# Patient Record
Sex: FEMALE | Race: WHITE
[De-identification: names, ages, dates, MRNs, and addresses within clinical notes are randomized per-mention and may not be internally consistent; named-entity substitution may affect disease eponyms.]

---

## 2018-05-14 ENCOUNTER — HOSPITAL ENCOUNTER (OUTPATIENT)
Dept: HOSPITAL 88 - RAD | Age: 83
End: 2018-05-14
Attending: FAMILY MEDICINE
Payer: MEDICARE

## 2018-05-14 DIAGNOSIS — R05: Primary | ICD-10-CM

## 2018-05-14 PROCEDURE — 71046 X-RAY EXAM CHEST 2 VIEWS: CPT

## 2018-05-14 NOTE — DIAGNOSTIC IMAGING REPORT
PROCEDURE: X-RAY CHEST, TWO VIEWS

COMPARISON: 9/3/17.

INDICATIONS: COUGH

 

FINDINGS:



LUNGS: Stable hyperinflation. There is diffuse bronchial wall 

thickening without focal consolidation. Subsegmental atelectasis/scar 

has developed in the lateral right upper lobe. There is also 

subsegmental atelectasis/scar in the lingula.

 

PLEURA: No effusions or pneumothorax.

 

HEART \T\ 

MEDIASTINUM: The heart is normal in size. Aorta is ectatic but stable.

 

BONES \T\

SOFT TISSUES: Diffusely demineralized. Severe degenerative changes of 

the left shoulder are stable. No focal osseous lesions. 

 

An IVC filter in the upper abdomen is incompletely imaged.

 

CONCLUSION:

Diffuse bronchial wall thickening suggestive of bronchitis. No 

consolidation.

Stable pulmonary hyperinflation. 

 

Dictated by:  Lisa Mullen M.D. on 5/14/2018 at 14:39     

Electronically approved by:  Lisa Mullen M.D. on 5/14/2018 at 

14:39

## 2018-05-25 ENCOUNTER — HOSPITAL ENCOUNTER (OUTPATIENT)
Dept: HOSPITAL 88 - WCC | Age: 83
LOS: 6 days | End: 2018-05-31
Attending: INTERNAL MEDICINE
Payer: MEDICARE

## 2018-05-25 DIAGNOSIS — R60.0: ICD-10-CM

## 2018-05-25 DIAGNOSIS — L97.321: ICD-10-CM

## 2018-05-25 DIAGNOSIS — I87.332: Primary | ICD-10-CM

## 2018-05-25 DIAGNOSIS — I87.2: ICD-10-CM

## 2018-10-13 ENCOUNTER — HOSPITAL ENCOUNTER (INPATIENT)
Dept: HOSPITAL 88 - ER | Age: 83
LOS: 23 days | Discharge: NURSING, REHAB OR LONG-TERM CARE | DRG: 335 | End: 2018-11-05
Attending: INTERNAL MEDICINE | Admitting: INTERNAL MEDICINE
Payer: MEDICARE

## 2018-10-13 VITALS — HEIGHT: 66 IN | WEIGHT: 120.44 LBS | BODY MASS INDEX: 19.36 KG/M2

## 2018-10-13 DIAGNOSIS — I87.2: ICD-10-CM

## 2018-10-13 DIAGNOSIS — Z86.711: ICD-10-CM

## 2018-10-13 DIAGNOSIS — I50.9: ICD-10-CM

## 2018-10-13 DIAGNOSIS — E03.9: ICD-10-CM

## 2018-10-13 DIAGNOSIS — E87.6: ICD-10-CM

## 2018-10-13 DIAGNOSIS — D62: ICD-10-CM

## 2018-10-13 DIAGNOSIS — Z79.01: ICD-10-CM

## 2018-10-13 DIAGNOSIS — K56.51: Primary | ICD-10-CM

## 2018-10-13 DIAGNOSIS — R53.81: ICD-10-CM

## 2018-10-13 DIAGNOSIS — I35.0: ICD-10-CM

## 2018-10-13 DIAGNOSIS — A41.9: ICD-10-CM

## 2018-10-13 DIAGNOSIS — I25.10: ICD-10-CM
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
## 2018-10-13 DIAGNOSIS — J44.9: ICD-10-CM

## 2018-10-13 DIAGNOSIS — N30.00: ICD-10-CM

## 2018-10-13 DIAGNOSIS — Z86.718: ICD-10-CM

## 2018-10-13 DIAGNOSIS — R04.0: ICD-10-CM

## 2018-10-13 DIAGNOSIS — E86.0: ICD-10-CM

## 2018-10-13 DIAGNOSIS — I11.0: ICD-10-CM

## 2018-10-13 DIAGNOSIS — K21.9: ICD-10-CM

## 2018-10-13 DIAGNOSIS — E16.2: ICD-10-CM

## 2018-10-13 DIAGNOSIS — J15.6: ICD-10-CM

## 2018-10-13 DIAGNOSIS — I48.0: ICD-10-CM
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
## 2018-10-13 DIAGNOSIS — K56.41: ICD-10-CM

## 2018-10-13 DIAGNOSIS — E44.0: ICD-10-CM

## 2018-10-13 LAB
ALBUMIN SERPL-MCNC: 4.7 G/DL (ref 3.5–5)
ALBUMIN/GLOB SERPL: 1.5 {RATIO} (ref 0.8–2)
ALP SERPL-CCNC: 59 IU/L (ref 40–150)
ALT SERPL-CCNC: 15 IU/L (ref 0–55)
AMYLASE SERPL-CCNC: 77 U/L (ref 25–125)
ANION GAP SERPL CALC-SCNC: 20.8 MMOL/L (ref 8–16)
BACTERIA URNS QL MICRO: (no result) /HPF
BASOPHILS # BLD AUTO: 0 10*3/UL (ref 0–0.1)
BASOPHILS NFR BLD AUTO: 0.3 % (ref 0–1)
BILIRUB UR QL: NEGATIVE
BUN SERPL-MCNC: 21 MG/DL (ref 7–26)
BUN/CREAT SERPL: 19 (ref 6–25)
CALCIUM SERPL-MCNC: 11.9 MG/DL (ref 8.4–10.2)
CHLORIDE SERPL-SCNC: 102 MMOL/L (ref 98–107)
CK MB SERPL-MCNC: 1.1 NG/ML (ref 0–5)
CK SERPL-CCNC: 195 IU/L (ref 29–168)
CLARITY UR: CLEAR
CO2 SERPL-SCNC: 24 MMOL/L (ref 22–29)
COLOR UR: YELLOW
DEPRECATED APTT PLAS QN: 32.2 SECONDS (ref 23.8–35.5)
DEPRECATED INR PLAS: 0.9
DEPRECATED NEUTROPHILS # BLD AUTO: 8.5 10*3/UL (ref 2.1–6.9)
DEPRECATED RBC URNS MANUAL-ACNC: (no result) /HPF (ref 0–5)
EGFRCR SERPLBLD CKD-EPI 2021: 46 ML/MIN (ref 60–?)
EOSINOPHIL # BLD AUTO: 0 10*3/UL (ref 0–0.4)
EOSINOPHIL NFR BLD AUTO: 0.3 % (ref 0–6)
EPI CELLS URNS QL MICRO: (no result) /LPF
ERYTHROCYTE [DISTWIDTH] IN CORD BLOOD: 13.4 % (ref 11.7–14.4)
GLOBULIN PLAS-MCNC: 3.1 G/DL (ref 2.3–3.5)
GLUCOSE SERPLBLD-MCNC: 124 MG/DL (ref 74–118)
HCT VFR BLD AUTO: 39.4 % (ref 34.2–44.1)
HGB BLD-MCNC: 13.2 G/DL (ref 12–16)
HYALINE CASTS #/AREA URNS LPF: (no result) /[LPF] (ref 0–1)
KETONES UR QL STRIP.AUTO: (no result)
LEUKOCYTE ESTERASE UR QL STRIP.AUTO: NEGATIVE
LIPASE SERPL-CCNC: 52 U/L (ref 8–78)
LYMPHOCYTES # BLD: 1.3 10*3/UL (ref 1–3.2)
LYMPHOCYTES NFR BLD AUTO: 12.5 % (ref 18–39.1)
MAGNESIUM SERPL-MCNC: 1.9 MG/DL (ref 1.3–2.1)
MCH RBC QN AUTO: 32.6 PG (ref 28–32)
MCHC RBC AUTO-ENTMCNC: 33.5 G/DL (ref 31–35)
MCV RBC AUTO: 97.3 FL (ref 81–99)
MONOCYTES # BLD AUTO: 0.7 10*3/UL (ref 0.2–0.8)
MONOCYTES NFR BLD AUTO: 6.3 % (ref 4.4–11.3)
NEUTS SEG NFR BLD AUTO: 80.2 % (ref 38.7–80)
NITRITE UR QL STRIP.AUTO: NEGATIVE
PLATELET # BLD AUTO: 274 X10E3/UL (ref 140–360)
POTASSIUM SERPL-SCNC: 4.8 MMOL/L (ref 3.5–5.1)
PROT UR QL STRIP.AUTO: NEGATIVE
PROTHROMBIN TIME: 13 SECONDS (ref 11.9–14.5)
RBC # BLD AUTO: 4.05 X10E6/UL (ref 3.6–5.1)
SODIUM SERPL-SCNC: 142 MMOL/L (ref 136–145)
SP GR UR STRIP: 1.01 (ref 1.01–1.02)
UROBILINOGEN UR STRIP-MCNC: 0.2 MG/DL (ref 0.2–1)

## 2018-10-13 PROCEDURE — 96376 TX/PRO/DX INJ SAME DRUG ADON: CPT

## 2018-10-13 PROCEDURE — 93005 ELECTROCARDIOGRAM TRACING: CPT

## 2018-10-13 PROCEDURE — 80053 COMPREHEN METABOLIC PANEL: CPT

## 2018-10-13 PROCEDURE — 96361 HYDRATE IV INFUSION ADD-ON: CPT

## 2018-10-13 PROCEDURE — 86900 BLOOD TYPING SEROLOGIC ABO: CPT

## 2018-10-13 PROCEDURE — 96374 THER/PROPH/DIAG INJ IV PUSH: CPT

## 2018-10-13 PROCEDURE — 85025 COMPLETE CBC W/AUTO DIFF WBC: CPT

## 2018-10-13 PROCEDURE — 80202 ASSAY OF VANCOMYCIN: CPT

## 2018-10-13 PROCEDURE — 36600 WITHDRAWAL OF ARTERIAL BLOOD: CPT

## 2018-10-13 PROCEDURE — 74022 RADEX COMPL AQT ABD SERIES: CPT

## 2018-10-13 PROCEDURE — 82150 ASSAY OF AMYLASE: CPT

## 2018-10-13 PROCEDURE — 80048 BASIC METABOLIC PNL TOTAL CA: CPT

## 2018-10-13 PROCEDURE — 82553 CREATINE MB FRACTION: CPT

## 2018-10-13 PROCEDURE — 83605 ASSAY OF LACTIC ACID: CPT

## 2018-10-13 PROCEDURE — 87086 URINE CULTURE/COLONY COUNT: CPT

## 2018-10-13 PROCEDURE — 99284 EMERGENCY DEPT VISIT MOD MDM: CPT

## 2018-10-13 PROCEDURE — 85014 HEMATOCRIT: CPT

## 2018-10-13 PROCEDURE — 85610 PROTHROMBIN TIME: CPT

## 2018-10-13 PROCEDURE — 86920 COMPATIBILITY TEST SPIN: CPT

## 2018-10-13 PROCEDURE — 71045 X-RAY EXAM CHEST 1 VIEW: CPT

## 2018-10-13 PROCEDURE — 84630 ASSAY OF ZINC: CPT

## 2018-10-13 PROCEDURE — 84484 ASSAY OF TROPONIN QUANT: CPT

## 2018-10-13 PROCEDURE — 96365 THER/PROPH/DIAG IV INF INIT: CPT

## 2018-10-13 PROCEDURE — 71046 X-RAY EXAM CHEST 2 VIEWS: CPT

## 2018-10-13 PROCEDURE — 74176 CT ABD & PELVIS W/O CONTRAST: CPT

## 2018-10-13 PROCEDURE — 81001 URINALYSIS AUTO W/SCOPE: CPT

## 2018-10-13 PROCEDURE — 84478 ASSAY OF TRIGLYCERIDES: CPT

## 2018-10-13 PROCEDURE — 85018 HEMOGLOBIN: CPT

## 2018-10-13 PROCEDURE — 83690 ASSAY OF LIPASE: CPT

## 2018-10-13 PROCEDURE — 82948 REAGENT STRIP/BLOOD GLUCOSE: CPT

## 2018-10-13 PROCEDURE — 82805 BLOOD GASES W/O2 SATURATION: CPT

## 2018-10-13 PROCEDURE — 93306 TTE W/DOPPLER COMPLETE: CPT

## 2018-10-13 PROCEDURE — 97139 UNLISTED THERAPEUTIC PX: CPT

## 2018-10-13 PROCEDURE — 83735 ASSAY OF MAGNESIUM: CPT

## 2018-10-13 PROCEDURE — 36415 COLL VENOUS BLD VENIPUNCTURE: CPT

## 2018-10-13 PROCEDURE — 85730 THROMBOPLASTIN TIME PARTIAL: CPT

## 2018-10-13 PROCEDURE — 36569 INSJ PICC 5 YR+ W/O IMAGING: CPT

## 2018-10-13 PROCEDURE — 96366 THER/PROPH/DIAG IV INF ADDON: CPT

## 2018-10-13 PROCEDURE — 84134 ASSAY OF PREALBUMIN: CPT

## 2018-10-13 PROCEDURE — 87040 BLOOD CULTURE FOR BACTERIA: CPT

## 2018-10-13 PROCEDURE — 82550 ASSAY OF CK (CPK): CPT

## 2018-10-13 PROCEDURE — 87186 SC STD MICRODIL/AGAR DIL: CPT

## 2018-10-13 PROCEDURE — 86850 RBC ANTIBODY SCREEN: CPT

## 2018-10-13 RX ADMIN — TAZOBACTAM SODIUM AND PIPERACILLIN SODIUM SCH MLS/HR: 375; 3 INJECTION, SOLUTION INTRAVENOUS at 23:37

## 2018-10-13 NOTE — DIAGNOSTIC IMAGING REPORT
EXAM:  ABDOMEN COMP INCL UPR or DECUB

INDICATION: Vomiting and abdominal pain

COMPARISON: CT of the abdomen and pelvis September 11, 2016



FINDINGS:

LINES/TUBES: IVC filter L3/L4 level.



BOWEL PATTERN: No evidence for obstruction.



SOFT TISSUES: No abnormal calcifications.



LUNG BASES: No consolidations.



BONES: No acute findings. Surgical screws project over the left femoral neck.



IMPRESSION:

Large amount of retained stool without evidence for obstruction.











Signed by: Dr. Janeth Recinos M.D. on 10/13/2018 8:20 PM

## 2018-10-13 NOTE — DIAGNOSTIC IMAGING REPORT
EXAM: CT ABDOMEN AND PELVIS without IV CONTRAST

INDICATION:  Abdominal pain and emesis

COMPARISON:  None 

TECHNIQUE: The abdomen and pelvis were scanned using a multidetector helical

scanner. Coronal and sagittal reformations were obtained. Dose modulation,

iterative reconstruction, and/or weight based adjustment of the mA/kV was

utilized to reduce the radiation dose to as low as reasonably achievable.

Routine protocol performed.

IV Contrast: None

Oral Contrast: Gastrografin

CTDIvol has been reviewed. It is below the limits set by the Radiation Protocol

Committee (RPC).



FINDINGS:

LOWER THORAX: Mild bibasilar atelectasis.



LIVER: No masses

BILIARY: Normal gallbladder. No ductal dilation. 



SPLEEN: No masses

PANCREAS: No masses



ADRENALS: No nodules



RIGHT KIDNEY: No nephroureterolithiasis or hydronephrosis. 



LEFT KIDNEY: No nephroureterolithiasis or hydronephrosis.



GI TRACT: Thickened centralized loop of small bowel up to 4.4 cm. Large amount

of retained stool throughout the colon.



VESSELS:  Mild atherosclerotic changes of the abdominal aorta without aneurysm.

Birdsnest inferior vena cava filter.

PERITONEUM/RETROPERITONEUM: No free air or fluid

LYMPH NODES: No lymphadenopathy



REPRODUCTIVE ORGANS: Uterus and ovaries are not visualized.

BLADDER: Normal



SOFT TISSUES: Stable nonspecific lobulated subcutaneous cyst in the superficial

soft tissues of the right groin.

BONES: No suspicious bone lesions.



IMPRESSION:

Proximal small bowel obstruction without cause identified by CT. 



Large amount of retained stool.



Signed by: Dr. Janeth Recinos M.D. on 10/13/2018 11:46 PM

## 2018-10-13 NOTE — DIAGNOSTIC IMAGING REPORT
EXAM: CHEST SINGLE (PORTABLE), AP 1 view

INDICATION: Vomiting abdominal pain

COMPARISON: Chest x-ray September 3, 2017



FINDINGS:

LINES/TUBES: Inferior vena cava filter.



LUNGS: No consolidations or edema. Emphysematous changes. Biapical

pleural-parenchymal thickening.



PLEURA: No effusions or pneumothorax.



HEART AND MEDIASTINUM: Normal size and contour.



BONES AND SOFT TISSUES: No acute findings. Degenerative changes of the

bilateral shoulders.



IMPRESSION:

Emphysematous changes without consolidation.







Signed by: Dr. Janeth Recinos M.D. on 10/13/2018 8:22 PM

## 2018-10-14 VITALS — DIASTOLIC BLOOD PRESSURE: 57 MMHG | SYSTOLIC BLOOD PRESSURE: 118 MMHG

## 2018-10-14 VITALS — DIASTOLIC BLOOD PRESSURE: 63 MMHG | SYSTOLIC BLOOD PRESSURE: 145 MMHG

## 2018-10-14 VITALS — SYSTOLIC BLOOD PRESSURE: 157 MMHG | DIASTOLIC BLOOD PRESSURE: 75 MMHG

## 2018-10-14 VITALS — SYSTOLIC BLOOD PRESSURE: 126 MMHG | DIASTOLIC BLOOD PRESSURE: 61 MMHG

## 2018-10-14 VITALS — SYSTOLIC BLOOD PRESSURE: 138 MMHG | DIASTOLIC BLOOD PRESSURE: 63 MMHG

## 2018-10-14 VITALS — DIASTOLIC BLOOD PRESSURE: 63 MMHG | SYSTOLIC BLOOD PRESSURE: 154 MMHG

## 2018-10-14 VITALS — DIASTOLIC BLOOD PRESSURE: 63 MMHG | SYSTOLIC BLOOD PRESSURE: 138 MMHG

## 2018-10-14 VITALS — DIASTOLIC BLOOD PRESSURE: 74 MMHG | SYSTOLIC BLOOD PRESSURE: 169 MMHG

## 2018-10-14 PROCEDURE — 0D9670Z DRAINAGE OF STOMACH WITH DRAINAGE DEVICE, VIA NATURAL OR ARTIFICIAL OPENING: ICD-10-PCS | Performed by: EMERGENCY MEDICINE

## 2018-10-14 RX ADMIN — SODIUM CHLORIDE PRN MG: 900 INJECTION INTRAVENOUS at 11:20

## 2018-10-14 RX ADMIN — SODIUM CHLORIDE SCH MLS/HR: 9 INJECTION, SOLUTION INTRAVENOUS at 11:14

## 2018-10-14 RX ADMIN — SODIUM CHLORIDE PRN MG: 900 INJECTION INTRAVENOUS at 02:59

## 2018-10-14 RX ADMIN — SODIUM CHLORIDE PRN MG: 900 INJECTION INTRAVENOUS at 15:47

## 2018-10-14 RX ADMIN — TAZOBACTAM SODIUM AND PIPERACILLIN SODIUM SCH MLS/HR: 375; 3 INJECTION, SOLUTION INTRAVENOUS at 18:36

## 2018-10-14 RX ADMIN — TAZOBACTAM SODIUM AND PIPERACILLIN SODIUM SCH MLS/HR: 375; 3 INJECTION, SOLUTION INTRAVENOUS at 06:03

## 2018-10-14 RX ADMIN — SODIUM CHLORIDE PRN MG: 900 INJECTION INTRAVENOUS at 06:52

## 2018-10-14 RX ADMIN — SODIUM CHLORIDE SCH MLS/HR: 9 INJECTION, SOLUTION INTRAVENOUS at 22:53

## 2018-10-14 RX ADMIN — Medication PRN MG: at 11:20

## 2018-10-14 RX ADMIN — SODIUM CHLORIDE PRN MG: 900 INJECTION INTRAVENOUS at 18:56

## 2018-10-14 RX ADMIN — SODIUM CHLORIDE SCH MLS/HR: 9 INJECTION, SOLUTION INTRAVENOUS at 01:31

## 2018-10-14 RX ADMIN — Medication PRN MG: at 02:59

## 2018-10-14 RX ADMIN — TAZOBACTAM SODIUM AND PIPERACILLIN SODIUM SCH MLS/HR: 375; 3 INJECTION, SOLUTION INTRAVENOUS at 12:11

## 2018-10-14 RX ADMIN — Medication PRN MG: at 18:50

## 2018-10-14 RX ADMIN — Medication PRN MG: at 06:52

## 2018-10-14 NOTE — DIAGNOSTIC IMAGING REPORT
EXAM: ABDOMEN COMP INCL UPR or DECUB



DATE: 10/14/2018 11:56 AM

 

INDICATION:   Small bowel obstruction 10/13/2018 CT and radiographs, no reports

available.



COMPARISON: None



FINDINGS:

Left apex curvature of the lumbar spine, advanced degenerative changes of the

spine, SI joints, and hips, IVC filter, and lag screws in the left hip, stable.

Moderate stool present. Limited bowel gas.



IMPRESSION: 

Moderate stool.



Exam similar to previous.



Signed by: Dr. Chad Briones MD on 10/14/2018 1:57 PM

## 2018-10-15 VITALS — SYSTOLIC BLOOD PRESSURE: 141 MMHG | DIASTOLIC BLOOD PRESSURE: 60 MMHG

## 2018-10-15 VITALS — SYSTOLIC BLOOD PRESSURE: 164 MMHG | DIASTOLIC BLOOD PRESSURE: 68 MMHG

## 2018-10-15 VITALS — SYSTOLIC BLOOD PRESSURE: 152 MMHG | DIASTOLIC BLOOD PRESSURE: 65 MMHG

## 2018-10-15 VITALS — SYSTOLIC BLOOD PRESSURE: 144 MMHG | DIASTOLIC BLOOD PRESSURE: 64 MMHG

## 2018-10-15 VITALS — DIASTOLIC BLOOD PRESSURE: 70 MMHG | SYSTOLIC BLOOD PRESSURE: 161 MMHG

## 2018-10-15 VITALS — SYSTOLIC BLOOD PRESSURE: 162 MMHG | DIASTOLIC BLOOD PRESSURE: 72 MMHG

## 2018-10-15 VITALS — DIASTOLIC BLOOD PRESSURE: 65 MMHG | SYSTOLIC BLOOD PRESSURE: 153 MMHG

## 2018-10-15 LAB
ALBUMIN SERPL-MCNC: 3.7 G/DL (ref 3.5–5)
ALBUMIN/GLOB SERPL: 1.4 {RATIO} (ref 0.8–2)
ALP SERPL-CCNC: 53 IU/L (ref 40–150)
ALT SERPL-CCNC: 11 IU/L (ref 0–55)
ANION GAP SERPL CALC-SCNC: 17.1 MMOL/L (ref 8–16)
BASOPHILS # BLD AUTO: 0 10*3/UL (ref 0–0.1)
BASOPHILS NFR BLD AUTO: 0.2 % (ref 0–1)
BUN SERPL-MCNC: 15 MG/DL (ref 7–26)
BUN/CREAT SERPL: 15 (ref 6–25)
CALCIUM SERPL-MCNC: 9.7 MG/DL (ref 8.4–10.2)
CHLORIDE SERPL-SCNC: 107 MMOL/L (ref 98–107)
CO2 SERPL-SCNC: 23 MMOL/L (ref 22–29)
DEPRECATED NEUTROPHILS # BLD AUTO: 7.3 10*3/UL (ref 2.1–6.9)
EGFRCR SERPLBLD CKD-EPI 2021: 54 ML/MIN (ref 60–?)
EOSINOPHIL # BLD AUTO: 0.2 10*3/UL (ref 0–0.4)
EOSINOPHIL NFR BLD AUTO: 2.4 % (ref 0–6)
ERYTHROCYTE [DISTWIDTH] IN CORD BLOOD: 13.6 % (ref 11.7–14.4)
GLOBULIN PLAS-MCNC: 2.6 G/DL (ref 2.3–3.5)
GLUCOSE SERPLBLD-MCNC: 83 MG/DL (ref 74–118)
HCT VFR BLD AUTO: 37.7 % (ref 34.2–44.1)
HGB BLD-MCNC: 12.5 G/DL (ref 12–16)
LYMPHOCYTES # BLD: 0.9 10*3/UL (ref 1–3.2)
LYMPHOCYTES NFR BLD AUTO: 10 % (ref 18–39.1)
MCH RBC QN AUTO: 33.1 PG (ref 28–32)
MCHC RBC AUTO-ENTMCNC: 33.2 G/DL (ref 31–35)
MCV RBC AUTO: 99.7 FL (ref 81–99)
MONOCYTES # BLD AUTO: 0.8 10*3/UL (ref 0.2–0.8)
MONOCYTES NFR BLD AUTO: 8.3 % (ref 4.4–11.3)
NEUTS SEG NFR BLD AUTO: 78.8 % (ref 38.7–80)
PLATELET # BLD AUTO: 241 X10E3/UL (ref 140–360)
POTASSIUM SERPL-SCNC: 4.1 MMOL/L (ref 3.5–5.1)
RBC # BLD AUTO: 3.78 X10E6/UL (ref 3.6–5.1)
SODIUM SERPL-SCNC: 143 MMOL/L (ref 136–145)

## 2018-10-15 RX ADMIN — SODIUM CHLORIDE SCH MLS/HR: 9 INJECTION, SOLUTION INTRAVENOUS at 22:10

## 2018-10-15 RX ADMIN — TAZOBACTAM SODIUM AND PIPERACILLIN SODIUM SCH MLS/HR: 375; 3 INJECTION, SOLUTION INTRAVENOUS at 16:55

## 2018-10-15 RX ADMIN — SODIUM CHLORIDE PRN MG: 900 INJECTION INTRAVENOUS at 10:06

## 2018-10-15 RX ADMIN — TAZOBACTAM SODIUM AND PIPERACILLIN SODIUM SCH MLS/HR: 375; 3 INJECTION, SOLUTION INTRAVENOUS at 00:14

## 2018-10-15 RX ADMIN — TAZOBACTAM SODIUM AND PIPERACILLIN SODIUM SCH MLS/HR: 375; 3 INJECTION, SOLUTION INTRAVENOUS at 23:21

## 2018-10-15 RX ADMIN — SODIUM CHLORIDE SCH MLS/HR: 9 INJECTION, SOLUTION INTRAVENOUS at 10:06

## 2018-10-15 RX ADMIN — TAZOBACTAM SODIUM AND PIPERACILLIN SODIUM SCH MLS/HR: 375; 3 INJECTION, SOLUTION INTRAVENOUS at 05:14

## 2018-10-15 RX ADMIN — TAZOBACTAM SODIUM AND PIPERACILLIN SODIUM SCH MLS/HR: 375; 3 INJECTION, SOLUTION INTRAVENOUS at 12:29

## 2018-10-15 RX ADMIN — SODIUM CHLORIDE PRN MG: 900 INJECTION INTRAVENOUS at 05:32

## 2018-10-15 RX ADMIN — SODIUM CHLORIDE SCH MLS/HR: 9 INJECTION, SOLUTION INTRAVENOUS at 06:15

## 2018-10-15 NOTE — DIAGNOSTIC IMAGING REPORT
PROCEDURE:ABDOMEN COMP INCL UPR OR DECUB

INDICATION:Small bowel obstruction

COMPARISON:Abdominal radiographs 10/14/2018.

 

FINDINGS:

 

The bowel gas pattern shows no grossly dilated, air-filled loops of 

bowel. Gas and fecal material is noted throughout the large bowel and 

rectum. Upright radiograph shows no daryl pneumoperitoneum. Gastric 

air-fluid level is again noted.

 

Levoscoliotic curvature of the lumbar spine. Birds nest IVC filter 

unchanged.. Left proximal femoral nails unchanged.

 

CONCLUSION:

Moderate fecal burden within the colon, unchanged.

 

Dilated loops of small bowel described on the comparison CT 10/13/2018 

are not identified by plain radiography. No daryl pneumoperitoneum.

 

 

 

Dictated by:  Stevo Combs M.D. on 10/15/2018 at 9:24     

Electronically approved by:  Stevo Combs M.D. on 10/15/2018 at 9:24

## 2018-10-16 VITALS — DIASTOLIC BLOOD PRESSURE: 60 MMHG | SYSTOLIC BLOOD PRESSURE: 127 MMHG

## 2018-10-16 VITALS — DIASTOLIC BLOOD PRESSURE: 58 MMHG | SYSTOLIC BLOOD PRESSURE: 142 MMHG

## 2018-10-16 VITALS — DIASTOLIC BLOOD PRESSURE: 61 MMHG | SYSTOLIC BLOOD PRESSURE: 141 MMHG

## 2018-10-16 VITALS — SYSTOLIC BLOOD PRESSURE: 127 MMHG | DIASTOLIC BLOOD PRESSURE: 60 MMHG

## 2018-10-16 VITALS — DIASTOLIC BLOOD PRESSURE: 65 MMHG | SYSTOLIC BLOOD PRESSURE: 140 MMHG

## 2018-10-16 VITALS — SYSTOLIC BLOOD PRESSURE: 145 MMHG | DIASTOLIC BLOOD PRESSURE: 65 MMHG

## 2018-10-16 RX ADMIN — SODIUM CHLORIDE PRN MG: 900 INJECTION INTRAVENOUS at 01:00

## 2018-10-16 RX ADMIN — TAZOBACTAM SODIUM AND PIPERACILLIN SODIUM SCH MLS/HR: 375; 3 INJECTION, SOLUTION INTRAVENOUS at 17:43

## 2018-10-16 RX ADMIN — SODIUM CHLORIDE PRN MG: 900 INJECTION INTRAVENOUS at 23:07

## 2018-10-16 RX ADMIN — SODIUM CHLORIDE PRN MG: 900 INJECTION INTRAVENOUS at 11:11

## 2018-10-16 RX ADMIN — TAZOBACTAM SODIUM AND PIPERACILLIN SODIUM SCH MLS/HR: 375; 3 INJECTION, SOLUTION INTRAVENOUS at 23:35

## 2018-10-16 RX ADMIN — SODIUM CHLORIDE SCH MLS/HR: 9 INJECTION, SOLUTION INTRAVENOUS at 09:37

## 2018-10-16 RX ADMIN — SODIUM CHLORIDE SCH MLS/HR: 9 INJECTION, SOLUTION INTRAVENOUS at 21:40

## 2018-10-16 RX ADMIN — Medication PRN MG: at 01:00

## 2018-10-16 RX ADMIN — SODIUM CHLORIDE SCH MLS/HR: 9 INJECTION, SOLUTION INTRAVENOUS at 01:57

## 2018-10-16 RX ADMIN — TAZOBACTAM SODIUM AND PIPERACILLIN SODIUM SCH MLS/HR: 375; 3 INJECTION, SOLUTION INTRAVENOUS at 11:57

## 2018-10-16 RX ADMIN — TAZOBACTAM SODIUM AND PIPERACILLIN SODIUM SCH MLS/HR: 375; 3 INJECTION, SOLUTION INTRAVENOUS at 05:11

## 2018-10-16 NOTE — XMS REPORT
Patient Summary Document

                             Created on: 10/16/2018



EDIN HCIANG

External Reference #: [id number]

: 1932

Sex: Female



Demographics







                          Address                   [address] [place] [address]

 

                          Home Phone                [phone]

 

                          Preferred Language        Unknown

 

                          Marital Status            Unknown

 

                          Synagogue Affiliation     Unknown

 

                          Race                      Unknown

 

                                        Additional Race(s)  

 

                          Ethnic Group              Unknown





Author







                          Author                    [facility]

 

                          Address                   Unknown

 

                          Phone                     Unavailable







Care Team Providers







                    Care Team Member Name    Role                Phone

 

                    MIKAEL FRANKS    Unavailable         Unavailable

 

                    CELE UNDERWOOD        Unavailable         Unavailable

 

                    SRAVANTHI FRANKS    Unavailable         Unavailable

 

                    CHERI TRAN    Unavailable         Unavailable







Problems

This patient has no known problems.



Allergies, Adverse Reactions, Alerts

This patient has no known allergies or adverse reactions.



Medications

This patient has no known medications.



Results







           Test Description    Test Time    Test Comments    Text Results    Atomic Results    Result

 Comments

 

                ABDOMEN COMP INCL UPR or DECUB    2018-10-15 09:24:00                        [place] [address]      Patient
Name: EDIN CHIANG   MR #: [id number]    : 1932 Age/Sex: 86/F  
Acct #: [id number] Req #: [id number]  Adm Physician: MIKAEL FRANKS MD    
Ordered by: FRANCISCO DEL RIO MD  Report #: [id number]   Location: MED/SURG  
Room/Bed: [facility]    ___________________________________________
________________________________________________________    Procedure: [id number]
DX/ABDOMEN COMP INCL UPR or DECUB  Exam Date: 10/15/18                          
 Exam Time: 0850       REPORT STATUS: Signed    PROCEDURE:   ABDOMEN COMP INCL 
UPR OR DECUB   INDICATION:   Small bowel obstruction   COMPARISON:   Abdominal 
radiographs 10/14/2018.       FINDINGS:       The bowel gas pattern shows no 
grossly dilated, air-filled loops of    bowel. Gas and fecal material is noted 
throughout the large bowel and    rectum. Upright radiograph shows no daryl 
pneumoperitoneum. Gastric    air-fluid level is again noted.       Levoscoliotic
curvature of the lumbar spine. Birds nest IVC filter    unchanged.. Left 
proximal femoral nails unchanged.       CONCLUSION:   Moderate fecal burden 
within the colon, unchanged.       Dilated loops of small bowel described on the
comparison CT 10/13/2018    are not identified by plain radiography. No daryl 
pneumoperitoneum.               Dictated by:  Jessica Combs M.D. on 10/15/2018 at 
9:24        Electronically approved by:  Jessica Combs M.D. on 10/15/2018 at 9:24 
              Dictated By: JESSICA COMBS MD  Electronically Signed By: JESSICA COMBS MD on 10/15/18 0924  Transcribed By: GLENIS on 10/15/18 0924       COPY TO:
  FRANCISCO DEL RIO MD                   

 

                ABDOMEN COMP INCL UPR or DECUB    2018-10-14 13:56:00                        [place] [address]      Patient
Name: EDIN CHIANG   MR #: [id number]    : 1932 Age/Sex: 86/F  
Acct #: [id number] Req #: [id number]  Adm Physician: MIKAEL FRANKS MD    
Ordered by: FRANCISCO DEL RIO MD  Report #: [id number]   Location: MED/SURG  
Room/Bed: [facility]    ___________________________________________
________________________________________________________    Procedure: [id number]
DX/ABDOMEN COMP INCL UPR or DECUB  Exam Date: 10/14/18                          
 Exam Time: 1345       REPORT STATUS: Signed    EXAM: ABDOMEN COMP INCL UPR or 
DECUB      DATE: 10/14/2018 11:56 AM       INDICATION:   Small bowel obstruction
10/13/2018 CT and radiographs, no reports   available.      COMPARISON: None    
 FINDINGS:   Left apex curvature of the lumbar spine, advanced degenerative 
changes of the   spine, SI joints, and hips, IVC filter, and lag screws in the 
left hip, stable.   Moderate stool present. Limited bowel gas.      IMPRESSION: 
  Moderate stool.      Exam similar to previous.      Signed by: Dr. Beto Briones MD on 10/14/2018 1:57 PM        Dictated By: BETO BRIONES MD  
Electronically Signed By: BETO BRIONES MD on 10/14/18 1357  Transcribed By: 
MARTÍN on 10/14/18 1357       COPY TO:   FRANCISCO DEL RIO MD             

 

                CT ABDOMEN/PELVIS WO    2018-10-13 23:22:00                        [place] [address]      Patient Name: 
EDIN CHIANG   MR #: [id number]    : 1932 Age/Sex: 86/F  Acct #: 
[id number] Req #: [id number]  Adm Physician:     Ordered by: MELLY CORREA MD
 Report #: [id number]   Location: ER  Room/Bed:     
___________________________________________________________________________
________________________    Procedure: [id number] CT/CT ABDOMEN/PELVIS WO  Exam 
Date:                             Exam Time:        REPORT STATUS: Signed    
EXAM: CT ABDOMEN AND PELVIS without IV CONTRAST   INDICATION:  Abdominal pain 
and emesis   COMPARISON:  None    TECHNIQUE: The abdomen and pelvis were scanned
using a multidetector helical   scanner. Coronal and sagittal reformations were 
obtained. Dose modulation,   iterative reconstruction, and/or weight based 
adjustment of the mA/kV was   utilized to reduce the radiation dose to as low as
reasonably achievable.   Routine protocol performed.   IV Contrast: None   Oral 
Contrast: Gastrografin   CTDIvol has been reviewed. It is below the limits set 
by the Radiation Protocol   Committee (RPC).      FINDINGS:   LOWER THORAX: Mild
bibasilar atelectasis.      LIVER: No masses   BILIARY: Normal gallbladder. No 
ductal dilation.       SPLEEN: No masses   PANCREAS: No masses      ADRENALS: No
nodules      RIGHT KIDNEY: No nephroureterolithiasis or hydronephrosis.       
LEFT KIDNEY: No nephroureterolithiasis or hydronephrosis.      GI TRACT: 
Thickened centralized loop of small bowel up to 4.4 cm. Large amount   of 
retained stool throughout the colon.      VESSELS:  Mild atherosclerotic changes
of the abdominal aorta without aneurysm.   Birdsnest inferior vena cava filter. 
 PERITONEUM/RETROPERITONEUM: No free air or fluid   LYMPH NODES: No lymphade
nopathy      REPRODUCTIVE ORGANS: Uterus and ovaries are not visualized.   
BLADDER: Normal      SOFT TISSUES: Stable nonspecific lobulated subcutaneous 
cyst in the superficial   soft tissues of the right groin.   BONES: No 
suspicious bone lesions.      IMPRESSION:   Proximal small bowel obstruction 
without cause identified by CT.       Large amount of retained stool.      
Signed by: Dr. Bryan Recinos M.D. on 10/13/2018 11:46 PM        Dictated 
By: BRYAN RECINOS MD  Electronically Signed By: BRYAN RECINOS MD on 10/13/18 
2346  Transcribed By: MARTÍN on 10/13/18 2346       COPY TO:   MELLY CORREA MD
                                         

 

                CHEST SINGLE (PORTABLE)    2018-10-13 20:20:00                        [place] [address]      Patient 
Name: EDIN CHIANG   MR #: [id number]    : 1932 Age/Sex: 86/F  
Acct #: [id number] Req #: [id number]  Adm Physician:     Ordered by: MELLY CORREA MD  Report #: [id number]   Location: ER  Room/Bed:     
___________________________________________________________________________
________________________    Procedure: [id number] DX/CHEST SINGLE (PORTABLE)  
Exam Date: 10/13/18                            Exam Time:        REPORT 
STATUS: Signed    EXAM: CHEST SINGLE (PORTABLE), AP 1 view   INDICATION: 
Vomiting abdominal pain   COMPARISON: Chest x-ray September 3, 2017      
FINDINGS:   LINES/TUBES: Inferior vena cava filter.      LUNGS: No 
consolidations or edema. Emphysematous changes. Biapical   pleural-parenchymal 
thickening.      PLEURA: No effusions or pneumothorax.      HEART AND 
MEDIASTINUM: Normal size and contour.      BONES AND SOFT TISSUES: No acute 
findings. Degenerative changes of the   bilateral shoulders.      IMPRESSION:   
Emphysematous changes without consolidation.            Signed by: Dr. Bryan Recinos M.D. on 10/13/2018 8:22 PM        Dictated By: BRYAN RECINOS MD  
Electronically Signed By: RBYAN RECINOS MD on 10/13/18 2022  Transcribed By: 
MARTÍN on 10/13/18 2022       COPY TO:   MELLY CORREA MD             

 

                ABDOMEN COMP INCL UPR or DECUB    2018-10-13 20:18:00                        [place] [address]      Patient
Name: EDIN CHIANG   MR #: [id number]    : 1932 Age/Sex: 86/F  
Acct #: [id number] Req #: [id number]  Adm Physician:     Ordered by: MELLY CORREA MD  Report #: [id number]   Location: ER  Room/Bed:     
___________________________________________________________________________
________________________    Procedure: [id number] DX/ABDOMEN COMP INCL UPR or 
DECUB  Exam Date: 10/13/18                            Exam Time:        
REPORT STATUS: Signed    EXAM:  ABDOMEN COMP INCL UPR or DECUB   INDICATION: 
Vomiting and abdominal pain   COMPARISON: CT of the abdomen and pelvis 2016      FINDINGS:   LINES/TUBES: IVC filter L3/L4 level.      BOWEL 
PATTERN: No evidence for obstruction.      SOFT TISSUES: No abnormal 
calcifications.      LUNG BASES: No consolidations.      BONES: No acute 
findings. Surgical screws project over the left femoral neck.      IMPRESSION:  
Large amount of retained stool without evidence for obstruction.                
 Signed by: Dr. Bryan Recinos M.D. on 10/13/2018 8:20 PM        Dictated 
By: BRYAN RECINOS MD  Electronically Signed By: BRYAN RECINOS MD on 10/13/18 
2020  Transcribed By: MARTÍN on 10/13/18 2020       COPY TO:   MELLY CORREA MD
                                         

 

                CHEST 2 VIEWS                                        [place] [address]      Patient Name: EDIN CHIANG   MR 
#: [id number]    : 1932 Age/Sex: 85/F  Acct #: [id number] Req #: [id number]
[id number]  Adm Physician:     Ordered by: CELE UNDERWOOD MD  Report #: [id number]   
Location: [facility]  Room/Bed:     
_____________________________________________________________________________
______________________    Procedure: [id number] DX/CHEST 2 VIEWS  Exam Date: 
18                            Exam Time: 1400       REPORT STATUS: Signed 
  PROCEDURE: X-RAY CHEST, TWO VIEWS   COMPARISON: 9/3/17.   INDICATIONS: COUGH  
    FINDINGS:         LUNGS: Stable hyperinflation. There is diffuse bronchial 
wall    thickening without focal consolidation. Subsegmental atelectasis/scar   
has developed in the lateral right upper lobe. There is also    subsegmental 
atelectasis/scar in the lingula.       PLEURA: No effusions or pneumothorax.    
  HEART  T     MEDIASTINUM: The heart is normal in size. Aorta is ectatic but 
stable.       BONES  T    SOFT TISSUES: Diffusely demineralized. Severe 
degenerative changes of    the left shoulder are stable. No focal osseous 
lesions.        An IVC filter in the upper abdomen is incompletely imaged.      
CONCLUSION:      Diffuse bronchial wall thickening suggestive of bronchitis. No 
  consolidation.   Stable pulmonary hyperinflation.        Dictated by:  
Juanis Freeman M.D. on 2018 at 14:39        Electronically approved by:  
Juanis Freeman M.D. on 2018 at    14:39                Dictated By: 
JUANIS FREEMAN MD  Electronically Signed By: JUANIS FREEMAN MD on 18
143  Transcribed By: GLENIS on 18       COPY TO:   CELE UNDERWOOD MD     
                                         

 

                CT ABDOMEN/PELVIS W                                        [place] [address]      Patient Name: EDIN CHIANG   MR #: [id number]    : 1932 Age/Sex: 85/F  Acct #: [id number] Req 
#: [id number]  Adm Physician:     Ordered by: SRAVANTHI FRANKS MD  Report #: 
[id number]   Location: CT  Room/Bed:     
__________________________________________________________________________
_________________________    Procedure: [id number] CT/CT ABDOMEN/PELVIS W  Exam 
Date: 17                            Exam Time: 1415       REPORT STATUS: 
Signed    PROCEDURE: CT ABDOMEN AND PELVIS WITH CONTRAST       TECHNIQUE:    The
abdomen and pelvis were scanned utilizing a multidetector helical    scanner 
from the diaphragm to the lesser trochanter after the IV    administration of 
100 cc of Isovue 370 and the oral administration of    900 cc of 
water/Gastrografin.  Coronal and sagittal multiplanar    reformations were 
obtained.   DLP: 305.4 mGy-cm       COMPARISON: CT abdomen and pelvis 2016 
     INDICATIONS:   RIGHT LOWER QUAD PAIN FOR 3 WEEKS       FINDINGS:   LOWER 
THORAX: Normal.       HEPATOBILIARY: No focal hepatic lesions.  No biliary 
ductal dilatation.   SPLEEN: No splenomegaly.   PANCREAS: No focal masses or 
ductal dilatation.       ADRENALS: No adrenal nodules.   KIDNEYS/URETERS: No 
hydronephrosis, stones, or solid mass lesions.   PELVIC ORGANS/BLADDER: 
Hysterectomy. Otherwise, unremarkable.       PERITONEUM / RETROPERITONEUM: No 
free air or fluid.   LYMPH NODES: No lymphadenopathy.   VESSELS: Stable 
positioning of IVC filter.        GI TRACT: No distention or wall thickening. 
Large amount of stool in    the colon. Appendix is not visualized, but no 
stranding is noted in the    right lower quadrant to suggest appendicitis.      
  BONES AND SOFT TISSUES: Again noted fluid attenuating lesion in the    right 
groin measures 8 x 2.6 cm and likely represents a sebaceous cyst.     Left 
proximal femoral screws. No suspicious osseus lesions. Small fat    containing 
hernia in the anterior left lower quadrant with 2.1 cm neck    and 1.2 x 4.2 cm 
(AP x TV) sac.  S-shaped curvature of the lumbar    spine.        IMPRESSION:   
No acute abnormalities in the abdomen and pelvis.               Dictated by:  
Rubio Aguirre M.D. on 2017 at 15:02        Electronically approved by:  
Rubio Aguirre M.D. on 2017 at 15:02                   Dictated By: 
RUBIO AGUIRRE MD  Electronically Signed By: RUBIO AGUIRRE MD on 17  
Transcribed By: GLENIS on 17       COPY TO:   SRAVANTHI FRANKS MD       
                                         

 

                HIP RIGHT 2-3 VW (+/- PELVIS)                                        [place] [address]      Patient Name: 
EDIN CHIANG   MR #: [id number]    : 1932 Age/Sex: 85/F  Acct #: 
[id number] Req #: [id number]  [facility] Physician:     Ordered by: CELE UNDERWOOD MD  
Report #: [id number]   Location: [facility]  Room/Bed:     
_____________________________________________________________________________
______________________    Procedure: [id number] DX/HIP RIGHT 2-3 VW (+/- PELVIS) 
Exam Date:                             Exam Time:        REPORT STATUS: Signed  
 PROCEDURE:   HIP RIGHT 2-3 VW (+/- PELVIS)       COMPARISON:   None.       
INDICATIONS:   RIGHT HIP PAIN       FINDINGS:      BONES:   Decreased 
mineralization. No acute , displaced fracture or    dislocation. Mild to 
moderate degenerative changes in the right hip    joint, with joint space 
narrowing. No lytic or blastic lesions. 4    screws are noted in the proximal 
left femur. Marked degenerative disc    changes in the lower lumbosacral spine. 
 SOFT TISSUES:   Large amount of retained stool in the colon and rectum    
suggesting constipation.   OTHER:   Negative.           CONCLUSION:   1. 
Decreased mineralization, which limits evaluation of the bony    structures. No 
acute displaced fracture or dislocation. Correlate    clinically for need for 
further imaging, particularly if there is    history of trauma and clinical 
concern for occult fractures.   2. Mild to moderate degenerative changes in the 
right hip joint.                   Josesito Dent M.D.     Dictated by:  
Josesito Dent M.D. on 2017 at 17:47        Electronically approved 
by:  Josesito Dent M.D. on 2017 at    17:47                Dictated 
By: JOSESITO DENT MD  Electronically Signed By: JOSESITO DENT MD on 17  Transcribed By: GLENIS on 17       COPY TO:   CELE UNDERWOOD MD     
                                         

 

                CHEST SINGLE (PORTABLE)                                        [place] [address]      Patient Name: 
EDIN CHIANG   MR #: [id number]    : 1932 Age/Sex: 84/F  Acct #: 
[id number] Req #: [id number]  Adm Physician:     Ordered by: JOMAR TRAN MD  Report #: [id number]   Location: ER  Room/Bed:     
_________________________________________________________________________
__________________________    Procedure: [id number] DX/CHEST SINGLE (PORTABLE)  
Exam Date: 17                            Exam Time: 1106       REPORT 
STATUS: Signed    EXAMINATION:  CHEST SINGLE (PORTABLE)          INDICATION:    
                   COMPARISON:  CT abdomen and pelvis from 2016 and chest 
radiograph from   2016           FINDINGS:  AP view         TUBES and 
LINES:  None.      LUNGS:  Lungs are well inflated.  Minimal atelectasis in the 
left lower lobe   remains unchanged. Unchanged mild emphysematous changes.  
There is no evidence   of pneumonia or pulmonary edema.      PLEURA:  No pleural
effusion or pneumothorax.      HEART AND MEDIASTINUM:  The cardiomediastinal 
silhouette is unremarkable.          BONES AND SOFT TISSUES:  Right cervical 
rib.  Soft tissues are unremarkable.      UPPER ABDOMEN: No free air under the 
diaphragm. Partially visualized   radiopacities overlying the right upper 
quadrant may be external to the   patient.      IMPRESSION:    No acute thoracic
abnormality.         Signed by: Dr. Marisa Mcpherson M.D. on 9/3/2017 11:40 AM
       Dictated By: MARISA MCPHERSON MD  Electronically Signed By: 
MARISA MCPHERSON MD on 17 1140  Transcribed By: MARTÍN on 17
1140       COPY TO:   JOMAR TRAN MD

## 2018-10-17 VITALS — DIASTOLIC BLOOD PRESSURE: 58 MMHG | SYSTOLIC BLOOD PRESSURE: 148 MMHG

## 2018-10-17 VITALS — SYSTOLIC BLOOD PRESSURE: 137 MMHG | DIASTOLIC BLOOD PRESSURE: 70 MMHG

## 2018-10-17 VITALS — DIASTOLIC BLOOD PRESSURE: 77 MMHG | SYSTOLIC BLOOD PRESSURE: 172 MMHG

## 2018-10-17 VITALS — SYSTOLIC BLOOD PRESSURE: 154 MMHG | DIASTOLIC BLOOD PRESSURE: 65 MMHG

## 2018-10-17 VITALS — DIASTOLIC BLOOD PRESSURE: 74 MMHG | SYSTOLIC BLOOD PRESSURE: 184 MMHG

## 2018-10-17 RX ADMIN — METOPROLOL TARTRATE PRN MG: 1 INJECTION, SOLUTION INTRAVENOUS at 17:31

## 2018-10-17 RX ADMIN — SODIUM CHLORIDE PRN MG: 900 INJECTION INTRAVENOUS at 23:18

## 2018-10-17 RX ADMIN — SODIUM CHLORIDE SCH MLS/HR: 9 INJECTION, SOLUTION INTRAVENOUS at 09:32

## 2018-10-17 RX ADMIN — TAZOBACTAM SODIUM AND PIPERACILLIN SODIUM SCH MLS/HR: 375; 3 INJECTION, SOLUTION INTRAVENOUS at 17:06

## 2018-10-17 RX ADMIN — SODIUM CHLORIDE SCH MLS/HR: 9 INJECTION, SOLUTION INTRAVENOUS at 17:40

## 2018-10-17 RX ADMIN — TAZOBACTAM SODIUM AND PIPERACILLIN SODIUM SCH MLS/HR: 375; 3 INJECTION, SOLUTION INTRAVENOUS at 09:32

## 2018-10-17 RX ADMIN — TAZOBACTAM SODIUM AND PIPERACILLIN SODIUM SCH MLS/HR: 375; 3 INJECTION, SOLUTION INTRAVENOUS at 05:33

## 2018-10-17 RX ADMIN — Medication PRN MG: at 23:18

## 2018-10-17 NOTE — DIAGNOSTIC IMAGING REPORT
EXAMINATION:  ABDOMEN ACUTE SERIES W/PA CXR    



INDICATION: Follow-up small bowel obstruction.  

^F/U SBO

^[id number]

^1811 



COMPARISON:  Abdominal series 10/14/2018, CT abdomen and pelvis 10/13/2018.

Chest radiograph 10/13/2018

     

FINDINGS:

PA view of the chest. AP supine and upright views of the abdomen.



TUBES and LINES:  None.



LUNGS:  Lungs are well inflated.  Lungs are clear.   There is no evidence of

pneumonia or pulmonary edema.



PLEURA:  No pleural effusion or pneumothorax.



HEART AND MEDIASTINUM:  The cardiomediastinal silhouette is unremarkable.    



BONES AND SOFT TISSUES:  No acute osseous lesion. Left apex curvature of the

lumbar spine. Advanced degenerative changes of the spine, SI joints, and hips.

Lag screws in the left hip. Soft tissues are unremarkable. 



ABDOMEN: IVC filter. Paucity of small bowel gas. Moderate stool in the right

colon.



IMPRESSION: 

No acute thoracic abnormality.

Paucity of small bowel gas. Evaluation of SBO noted on 10/13/2018 by KUB is

limited as the bowel is fluid-filled on the prior CT.



Signed by: DR. Rubio Moeller MD on 10/17/2018 6:49 PM

## 2018-10-18 VITALS — SYSTOLIC BLOOD PRESSURE: 120 MMHG | DIASTOLIC BLOOD PRESSURE: 71 MMHG

## 2018-10-18 VITALS — SYSTOLIC BLOOD PRESSURE: 92 MMHG | DIASTOLIC BLOOD PRESSURE: 66 MMHG

## 2018-10-18 VITALS — SYSTOLIC BLOOD PRESSURE: 109 MMHG | DIASTOLIC BLOOD PRESSURE: 70 MMHG

## 2018-10-18 VITALS — DIASTOLIC BLOOD PRESSURE: 129 MMHG | SYSTOLIC BLOOD PRESSURE: 158 MMHG

## 2018-10-18 VITALS — DIASTOLIC BLOOD PRESSURE: 47 MMHG | SYSTOLIC BLOOD PRESSURE: 104 MMHG

## 2018-10-18 VITALS — SYSTOLIC BLOOD PRESSURE: 186 MMHG | DIASTOLIC BLOOD PRESSURE: 83 MMHG

## 2018-10-18 VITALS — SYSTOLIC BLOOD PRESSURE: 109 MMHG | DIASTOLIC BLOOD PRESSURE: 59 MMHG

## 2018-10-18 VITALS — DIASTOLIC BLOOD PRESSURE: 65 MMHG | SYSTOLIC BLOOD PRESSURE: 114 MMHG

## 2018-10-18 VITALS — SYSTOLIC BLOOD PRESSURE: 136 MMHG | DIASTOLIC BLOOD PRESSURE: 66 MMHG

## 2018-10-18 VITALS — DIASTOLIC BLOOD PRESSURE: 77 MMHG | SYSTOLIC BLOOD PRESSURE: 106 MMHG

## 2018-10-18 VITALS — DIASTOLIC BLOOD PRESSURE: 72 MMHG | SYSTOLIC BLOOD PRESSURE: 171 MMHG

## 2018-10-18 VITALS — DIASTOLIC BLOOD PRESSURE: 82 MMHG | SYSTOLIC BLOOD PRESSURE: 101 MMHG

## 2018-10-18 VITALS — DIASTOLIC BLOOD PRESSURE: 62 MMHG | SYSTOLIC BLOOD PRESSURE: 124 MMHG

## 2018-10-18 VITALS — SYSTOLIC BLOOD PRESSURE: 115 MMHG | DIASTOLIC BLOOD PRESSURE: 66 MMHG

## 2018-10-18 VITALS — DIASTOLIC BLOOD PRESSURE: 87 MMHG | SYSTOLIC BLOOD PRESSURE: 145 MMHG

## 2018-10-18 VITALS — DIASTOLIC BLOOD PRESSURE: 74 MMHG | SYSTOLIC BLOOD PRESSURE: 168 MMHG

## 2018-10-18 VITALS — DIASTOLIC BLOOD PRESSURE: 86 MMHG | SYSTOLIC BLOOD PRESSURE: 108 MMHG

## 2018-10-18 VITALS — SYSTOLIC BLOOD PRESSURE: 109 MMHG | DIASTOLIC BLOOD PRESSURE: 86 MMHG

## 2018-10-18 VITALS — DIASTOLIC BLOOD PRESSURE: 79 MMHG | SYSTOLIC BLOOD PRESSURE: 103 MMHG

## 2018-10-18 VITALS — SYSTOLIC BLOOD PRESSURE: 194 MMHG | DIASTOLIC BLOOD PRESSURE: 81 MMHG

## 2018-10-18 VITALS — SYSTOLIC BLOOD PRESSURE: 109 MMHG | DIASTOLIC BLOOD PRESSURE: 63 MMHG

## 2018-10-18 VITALS — DIASTOLIC BLOOD PRESSURE: 86 MMHG | SYSTOLIC BLOOD PRESSURE: 161 MMHG

## 2018-10-18 LAB
ALBUMIN SERPL-MCNC: 3.3 G/DL (ref 3.5–5)
ALBUMIN SERPL-MCNC: 3.6 G/DL (ref 3.5–5)
ALBUMIN/GLOB SERPL: 1.5 {RATIO} (ref 0.8–2)
ALBUMIN/GLOB SERPL: 1.6 {RATIO} (ref 0.8–2)
ALP SERPL-CCNC: 40 IU/L (ref 40–150)
ALP SERPL-CCNC: 42 IU/L (ref 40–150)
ALT SERPL-CCNC: 21 IU/L (ref 0–55)
ALT SERPL-CCNC: 22 IU/L (ref 0–55)
ANION GAP SERPL CALC-SCNC: 20 MMOL/L (ref 8–16)
ANION GAP SERPL CALC-SCNC: 20 MMOL/L (ref 8–16)
BASE EXCESS BLDA CALC-SCNC: -2 MMOL/L (ref -2–3)
BASOPHILS # BLD AUTO: 0 10*3/UL (ref 0–0.1)
BASOPHILS # BLD AUTO: 0.1 10*3/UL (ref 0–0.1)
BASOPHILS NFR BLD AUTO: 0.3 % (ref 0–1)
BASOPHILS NFR BLD AUTO: 0.4 % (ref 0–1)
BUN SERPL-MCNC: 18 MG/DL (ref 7–26)
BUN SERPL-MCNC: 19 MG/DL (ref 7–26)
BUN/CREAT SERPL: 24 (ref 6–25)
BUN/CREAT SERPL: 25 (ref 6–25)
CALCIUM SERPL-MCNC: 9 MG/DL (ref 8.4–10.2)
CALCIUM SERPL-MCNC: 9.4 MG/DL (ref 8.4–10.2)
CHLORIDE SERPL-SCNC: 106 MMOL/L (ref 98–107)
CHLORIDE SERPL-SCNC: 106 MMOL/L (ref 98–107)
CO2 SERPL-SCNC: 20 MMOL/L (ref 22–29)
CO2 SERPL-SCNC: 20 MMOL/L (ref 22–29)
DEPRECATED NEUTROPHILS # BLD AUTO: 8.6 10*3/UL (ref 2.1–6.9)
DEPRECATED NEUTROPHILS # BLD AUTO: 9 10*3/UL (ref 2.1–6.9)
EGFRCR SERPLBLD CKD-EPI 2021: > 60 ML/MIN (ref 60–?)
EGFRCR SERPLBLD CKD-EPI 2021: > 60 ML/MIN (ref 60–?)
EOSINOPHIL # BLD AUTO: 0 10*3/UL (ref 0–0.4)
EOSINOPHIL # BLD AUTO: 0.1 10*3/UL (ref 0–0.4)
EOSINOPHIL NFR BLD AUTO: 0.3 % (ref 0–6)
EOSINOPHIL NFR BLD AUTO: 0.4 % (ref 0–6)
ERYTHROCYTE [DISTWIDTH] IN CORD BLOOD: 13.3 % (ref 11.7–14.4)
ERYTHROCYTE [DISTWIDTH] IN CORD BLOOD: 13.4 % (ref 11.7–14.4)
GLOBULIN PLAS-MCNC: 2.2 G/DL (ref 2.3–3.5)
GLOBULIN PLAS-MCNC: 2.2 G/DL (ref 2.3–3.5)
GLUCOSE SERPLBLD-MCNC: 90 MG/DL (ref 74–118)
GLUCOSE SERPLBLD-MCNC: 91 MG/DL (ref 74–118)
HCO3 BLDA-SCNC: 22 MMOL/L (ref 23–28)
HCT VFR BLD AUTO: 36.1 % (ref 34.2–44.1)
HCT VFR BLD AUTO: 40.3 % (ref 34.2–44.1)
HGB BLD-MCNC: 12.1 G/DL (ref 12–16)
HGB BLD-MCNC: 13.4 G/DL (ref 12–16)
LYMPHOCYTES # BLD: 1.1 10*3/UL (ref 1–3.2)
LYMPHOCYTES # BLD: 1.4 10*3/UL (ref 1–3.2)
LYMPHOCYTES NFR BLD AUTO: 11.6 % (ref 18–39.1)
LYMPHOCYTES NFR BLD AUTO: 9.6 % (ref 18–39.1)
MCH RBC QN AUTO: 32.3 PG (ref 28–32)
MCH RBC QN AUTO: 32.6 PG (ref 28–32)
MCHC RBC AUTO-ENTMCNC: 33.3 G/DL (ref 31–35)
MCHC RBC AUTO-ENTMCNC: 33.5 G/DL (ref 31–35)
MCV RBC AUTO: 96.3 FL (ref 81–99)
MCV RBC AUTO: 98.1 FL (ref 81–99)
MONOCYTES # BLD AUTO: 1.3 10*3/UL (ref 0.2–0.8)
MONOCYTES # BLD AUTO: 1.4 10*3/UL (ref 0.2–0.8)
MONOCYTES NFR BLD AUTO: 11.4 % (ref 4.4–11.3)
MONOCYTES NFR BLD AUTO: 11.8 % (ref 4.4–11.3)
NEUTS SEG NFR BLD AUTO: 75.5 % (ref 38.7–80)
NEUTS SEG NFR BLD AUTO: 78 % (ref 38.7–80)
PCO2 BLDA: 34 MMHG (ref 41–51)
PCO2 BLDA: 73 MMHG (ref 80–105)
PH BLDA: 7.42 [PH] (ref 7.31–7.41)
PLATELET # BLD AUTO: 236 X10E3/UL (ref 140–360)
PLATELET # BLD AUTO: 261 X10E3/UL (ref 140–360)
POTASSIUM SERPL-SCNC: 3 MMOL/L (ref 3.5–5.1)
POTASSIUM SERPL-SCNC: 3 MMOL/L (ref 3.5–5.1)
RBC # BLD AUTO: 3.75 X10E6/UL (ref 3.6–5.1)
RBC # BLD AUTO: 4.11 X10E6/UL (ref 3.6–5.1)
SAO2 % BLDA: 95 % (ref 95–98)
SODIUM SERPL-SCNC: 143 MMOL/L (ref 136–145)
SODIUM SERPL-SCNC: 143 MMOL/L (ref 136–145)

## 2018-10-18 RX ADMIN — TAZOBACTAM SODIUM AND PIPERACILLIN SODIUM SCH MLS/HR: 375; 3 INJECTION, SOLUTION INTRAVENOUS at 11:43

## 2018-10-18 RX ADMIN — TAZOBACTAM SODIUM AND PIPERACILLIN SODIUM SCH MLS/HR: 375; 3 INJECTION, SOLUTION INTRAVENOUS at 05:33

## 2018-10-18 RX ADMIN — SODIUM CHLORIDE SCH MLS/HR: 9 INJECTION, SOLUTION INTRAVENOUS at 11:44

## 2018-10-18 RX ADMIN — SODIUM CHLORIDE SCH MLS/HR: 9 INJECTION, SOLUTION INTRAVENOUS at 04:12

## 2018-10-18 RX ADMIN — Medication PRN MG: at 04:12

## 2018-10-18 RX ADMIN — METOPROLOL TARTRATE PRN MG: 1 INJECTION, SOLUTION INTRAVENOUS at 11:43

## 2018-10-18 RX ADMIN — SODIUM CHLORIDE SCH MG: 900 INJECTION INTRAVENOUS at 23:48

## 2018-10-18 RX ADMIN — SODIUM CHLORIDE PRN MG: 900 INJECTION INTRAVENOUS at 04:12

## 2018-10-18 RX ADMIN — SODIUM CHLORIDE SCH ML: 900 IRRIGANT IRRIGATION at 22:32

## 2018-10-18 RX ADMIN — SODIUM CHLORIDE PRN MG: 900 INJECTION INTRAVENOUS at 11:30

## 2018-10-18 RX ADMIN — TAZOBACTAM SODIUM AND PIPERACILLIN SODIUM SCH MLS/HR: 375; 3 INJECTION, SOLUTION INTRAVENOUS at 17:10

## 2018-10-18 RX ADMIN — TAZOBACTAM SODIUM AND PIPERACILLIN SODIUM SCH MLS/HR: 375; 3 INJECTION, SOLUTION INTRAVENOUS at 00:15

## 2018-10-18 RX ADMIN — SODIUM CHLORIDE PRN MG: 900 INJECTION INTRAVENOUS at 15:14

## 2018-10-18 RX ADMIN — POTASSIUM CHLORIDE AND SODIUM CHLORIDE SCH MLS/HR: 900; 150 INJECTION, SOLUTION INTRAVENOUS at 22:34

## 2018-10-18 RX ADMIN — Medication PRN MG: at 15:17

## 2018-10-18 NOTE — DIAGNOSTIC IMAGING REPORT
EXAMINATION:  CHEST SINGLE (PORTABLE)    



INDICATION:           Change in condition, shortness of breath. 



COMPARISON:  10/17/2018

     

FINDINGS:

TUBES and LINES:  Interval placement of NG tube with tip at the level of the

gastric antrum.



LUNGS:  Lungs are not well inflated.  Interval development of left lower lobe

airspace opacity   .



PLEURA:  Small left pleural effusion has developed



HEART AND MEDIASTINUM:  The cardiomediastinal silhouette is unremarkable. There

are atherosclerotic calcifications within the aorta.



BONES AND SOFT TISSUES:  No acute osseous lesion. Degenerative changes of the

bilateral glenohumeral joints left greater than right. Soft tissues are

unremarkable.



UPPER ABDOMEN: No free air under the diaphragm.    



IMPRESSION: 

Findings are compatible with left lower lobe developing pneumonia with

associated small left pleural effusion.





Signed by: Dr. Awais Pelayo M.D. on 10/18/2018 10:38 PM

## 2018-10-19 VITALS — SYSTOLIC BLOOD PRESSURE: 152 MMHG | DIASTOLIC BLOOD PRESSURE: 65 MMHG

## 2018-10-19 VITALS — DIASTOLIC BLOOD PRESSURE: 79 MMHG | SYSTOLIC BLOOD PRESSURE: 147 MMHG

## 2018-10-19 VITALS — SYSTOLIC BLOOD PRESSURE: 160 MMHG | DIASTOLIC BLOOD PRESSURE: 68 MMHG

## 2018-10-19 VITALS — DIASTOLIC BLOOD PRESSURE: 59 MMHG | SYSTOLIC BLOOD PRESSURE: 135 MMHG

## 2018-10-19 VITALS — SYSTOLIC BLOOD PRESSURE: 144 MMHG | DIASTOLIC BLOOD PRESSURE: 71 MMHG

## 2018-10-19 VITALS — DIASTOLIC BLOOD PRESSURE: 59 MMHG | SYSTOLIC BLOOD PRESSURE: 106 MMHG

## 2018-10-19 VITALS — SYSTOLIC BLOOD PRESSURE: 128 MMHG | DIASTOLIC BLOOD PRESSURE: 57 MMHG

## 2018-10-19 VITALS — DIASTOLIC BLOOD PRESSURE: 61 MMHG | SYSTOLIC BLOOD PRESSURE: 142 MMHG

## 2018-10-19 VITALS — DIASTOLIC BLOOD PRESSURE: 59 MMHG | SYSTOLIC BLOOD PRESSURE: 140 MMHG

## 2018-10-19 VITALS — DIASTOLIC BLOOD PRESSURE: 67 MMHG | SYSTOLIC BLOOD PRESSURE: 149 MMHG

## 2018-10-19 VITALS — SYSTOLIC BLOOD PRESSURE: 154 MMHG | DIASTOLIC BLOOD PRESSURE: 64 MMHG

## 2018-10-19 VITALS — SYSTOLIC BLOOD PRESSURE: 155 MMHG | DIASTOLIC BLOOD PRESSURE: 72 MMHG

## 2018-10-19 VITALS — DIASTOLIC BLOOD PRESSURE: 84 MMHG | SYSTOLIC BLOOD PRESSURE: 105 MMHG

## 2018-10-19 VITALS — DIASTOLIC BLOOD PRESSURE: 63 MMHG | SYSTOLIC BLOOD PRESSURE: 133 MMHG

## 2018-10-19 VITALS — DIASTOLIC BLOOD PRESSURE: 56 MMHG | SYSTOLIC BLOOD PRESSURE: 132 MMHG

## 2018-10-19 VITALS — SYSTOLIC BLOOD PRESSURE: 146 MMHG | DIASTOLIC BLOOD PRESSURE: 74 MMHG

## 2018-10-19 VITALS — DIASTOLIC BLOOD PRESSURE: 59 MMHG | SYSTOLIC BLOOD PRESSURE: 141 MMHG

## 2018-10-19 VITALS — DIASTOLIC BLOOD PRESSURE: 67 MMHG | SYSTOLIC BLOOD PRESSURE: 143 MMHG

## 2018-10-19 VITALS — DIASTOLIC BLOOD PRESSURE: 64 MMHG | SYSTOLIC BLOOD PRESSURE: 153 MMHG

## 2018-10-19 VITALS — SYSTOLIC BLOOD PRESSURE: 129 MMHG | DIASTOLIC BLOOD PRESSURE: 60 MMHG

## 2018-10-19 VITALS — SYSTOLIC BLOOD PRESSURE: 129 MMHG | DIASTOLIC BLOOD PRESSURE: 54 MMHG

## 2018-10-19 VITALS — SYSTOLIC BLOOD PRESSURE: 127 MMHG | DIASTOLIC BLOOD PRESSURE: 63 MMHG

## 2018-10-19 VITALS — DIASTOLIC BLOOD PRESSURE: 59 MMHG | SYSTOLIC BLOOD PRESSURE: 132 MMHG

## 2018-10-19 VITALS — SYSTOLIC BLOOD PRESSURE: 154 MMHG | DIASTOLIC BLOOD PRESSURE: 66 MMHG

## 2018-10-19 VITALS — DIASTOLIC BLOOD PRESSURE: 64 MMHG | SYSTOLIC BLOOD PRESSURE: 149 MMHG

## 2018-10-19 VITALS — SYSTOLIC BLOOD PRESSURE: 105 MMHG | DIASTOLIC BLOOD PRESSURE: 55 MMHG

## 2018-10-19 VITALS — SYSTOLIC BLOOD PRESSURE: 115 MMHG | DIASTOLIC BLOOD PRESSURE: 63 MMHG

## 2018-10-19 VITALS — DIASTOLIC BLOOD PRESSURE: 65 MMHG | SYSTOLIC BLOOD PRESSURE: 153 MMHG

## 2018-10-19 VITALS — SYSTOLIC BLOOD PRESSURE: 79 MMHG | DIASTOLIC BLOOD PRESSURE: 51 MMHG

## 2018-10-19 VITALS — DIASTOLIC BLOOD PRESSURE: 63 MMHG | SYSTOLIC BLOOD PRESSURE: 155 MMHG

## 2018-10-19 VITALS — DIASTOLIC BLOOD PRESSURE: 71 MMHG | SYSTOLIC BLOOD PRESSURE: 144 MMHG

## 2018-10-19 VITALS — SYSTOLIC BLOOD PRESSURE: 132 MMHG | DIASTOLIC BLOOD PRESSURE: 56 MMHG

## 2018-10-19 VITALS — SYSTOLIC BLOOD PRESSURE: 151 MMHG | DIASTOLIC BLOOD PRESSURE: 67 MMHG

## 2018-10-19 VITALS — DIASTOLIC BLOOD PRESSURE: 61 MMHG | SYSTOLIC BLOOD PRESSURE: 152 MMHG

## 2018-10-19 VITALS — SYSTOLIC BLOOD PRESSURE: 146 MMHG | DIASTOLIC BLOOD PRESSURE: 90 MMHG

## 2018-10-19 VITALS — SYSTOLIC BLOOD PRESSURE: 132 MMHG | DIASTOLIC BLOOD PRESSURE: 55 MMHG

## 2018-10-19 VITALS — DIASTOLIC BLOOD PRESSURE: 64 MMHG | SYSTOLIC BLOOD PRESSURE: 146 MMHG

## 2018-10-19 VITALS — SYSTOLIC BLOOD PRESSURE: 140 MMHG | DIASTOLIC BLOOD PRESSURE: 58 MMHG

## 2018-10-19 VITALS — SYSTOLIC BLOOD PRESSURE: 116 MMHG | DIASTOLIC BLOOD PRESSURE: 77 MMHG

## 2018-10-19 VITALS — SYSTOLIC BLOOD PRESSURE: 119 MMHG | DIASTOLIC BLOOD PRESSURE: 72 MMHG

## 2018-10-19 VITALS — SYSTOLIC BLOOD PRESSURE: 156 MMHG | DIASTOLIC BLOOD PRESSURE: 66 MMHG

## 2018-10-19 VITALS — DIASTOLIC BLOOD PRESSURE: 65 MMHG | SYSTOLIC BLOOD PRESSURE: 147 MMHG

## 2018-10-19 VITALS — DIASTOLIC BLOOD PRESSURE: 62 MMHG | SYSTOLIC BLOOD PRESSURE: 147 MMHG

## 2018-10-19 VITALS — SYSTOLIC BLOOD PRESSURE: 130 MMHG | DIASTOLIC BLOOD PRESSURE: 56 MMHG

## 2018-10-19 VITALS — SYSTOLIC BLOOD PRESSURE: 124 MMHG | DIASTOLIC BLOOD PRESSURE: 59 MMHG

## 2018-10-19 VITALS — SYSTOLIC BLOOD PRESSURE: 143 MMHG | DIASTOLIC BLOOD PRESSURE: 50 MMHG

## 2018-10-19 VITALS — SYSTOLIC BLOOD PRESSURE: 124 MMHG | DIASTOLIC BLOOD PRESSURE: 63 MMHG

## 2018-10-19 VITALS — DIASTOLIC BLOOD PRESSURE: 64 MMHG | SYSTOLIC BLOOD PRESSURE: 140 MMHG

## 2018-10-19 VITALS — SYSTOLIC BLOOD PRESSURE: 148 MMHG | DIASTOLIC BLOOD PRESSURE: 67 MMHG

## 2018-10-19 VITALS — SYSTOLIC BLOOD PRESSURE: 143 MMHG | DIASTOLIC BLOOD PRESSURE: 68 MMHG

## 2018-10-19 VITALS — DIASTOLIC BLOOD PRESSURE: 58 MMHG | SYSTOLIC BLOOD PRESSURE: 146 MMHG

## 2018-10-19 VITALS — DIASTOLIC BLOOD PRESSURE: 68 MMHG | SYSTOLIC BLOOD PRESSURE: 139 MMHG

## 2018-10-19 VITALS — SYSTOLIC BLOOD PRESSURE: 131 MMHG | DIASTOLIC BLOOD PRESSURE: 63 MMHG

## 2018-10-19 VITALS — SYSTOLIC BLOOD PRESSURE: 140 MMHG | DIASTOLIC BLOOD PRESSURE: 63 MMHG

## 2018-10-19 VITALS — SYSTOLIC BLOOD PRESSURE: 134 MMHG | DIASTOLIC BLOOD PRESSURE: 61 MMHG

## 2018-10-19 VITALS — SYSTOLIC BLOOD PRESSURE: 125 MMHG | DIASTOLIC BLOOD PRESSURE: 60 MMHG

## 2018-10-19 VITALS — DIASTOLIC BLOOD PRESSURE: 60 MMHG | SYSTOLIC BLOOD PRESSURE: 142 MMHG

## 2018-10-19 VITALS — DIASTOLIC BLOOD PRESSURE: 63 MMHG | SYSTOLIC BLOOD PRESSURE: 136 MMHG

## 2018-10-19 VITALS — SYSTOLIC BLOOD PRESSURE: 151 MMHG | DIASTOLIC BLOOD PRESSURE: 79 MMHG

## 2018-10-19 VITALS — DIASTOLIC BLOOD PRESSURE: 63 MMHG | SYSTOLIC BLOOD PRESSURE: 123 MMHG

## 2018-10-19 VITALS — DIASTOLIC BLOOD PRESSURE: 61 MMHG | SYSTOLIC BLOOD PRESSURE: 119 MMHG

## 2018-10-19 VITALS — SYSTOLIC BLOOD PRESSURE: 139 MMHG | DIASTOLIC BLOOD PRESSURE: 61 MMHG

## 2018-10-19 VITALS — SYSTOLIC BLOOD PRESSURE: 144 MMHG | DIASTOLIC BLOOD PRESSURE: 58 MMHG

## 2018-10-19 VITALS — DIASTOLIC BLOOD PRESSURE: 153 MMHG | SYSTOLIC BLOOD PRESSURE: 157 MMHG

## 2018-10-19 VITALS — DIASTOLIC BLOOD PRESSURE: 55 MMHG | SYSTOLIC BLOOD PRESSURE: 136 MMHG

## 2018-10-19 VITALS — SYSTOLIC BLOOD PRESSURE: 140 MMHG | DIASTOLIC BLOOD PRESSURE: 86 MMHG

## 2018-10-19 VITALS — DIASTOLIC BLOOD PRESSURE: 67 MMHG | SYSTOLIC BLOOD PRESSURE: 146 MMHG

## 2018-10-19 VITALS — SYSTOLIC BLOOD PRESSURE: 125 MMHG | DIASTOLIC BLOOD PRESSURE: 63 MMHG

## 2018-10-19 VITALS — DIASTOLIC BLOOD PRESSURE: 62 MMHG | SYSTOLIC BLOOD PRESSURE: 146 MMHG

## 2018-10-19 VITALS — SYSTOLIC BLOOD PRESSURE: 136 MMHG | DIASTOLIC BLOOD PRESSURE: 66 MMHG

## 2018-10-19 VITALS — SYSTOLIC BLOOD PRESSURE: 145 MMHG | DIASTOLIC BLOOD PRESSURE: 58 MMHG

## 2018-10-19 VITALS — SYSTOLIC BLOOD PRESSURE: 155 MMHG | DIASTOLIC BLOOD PRESSURE: 62 MMHG

## 2018-10-19 VITALS — DIASTOLIC BLOOD PRESSURE: 62 MMHG | SYSTOLIC BLOOD PRESSURE: 123 MMHG

## 2018-10-19 VITALS — DIASTOLIC BLOOD PRESSURE: 61 MMHG | SYSTOLIC BLOOD PRESSURE: 133 MMHG

## 2018-10-19 VITALS — SYSTOLIC BLOOD PRESSURE: 144 MMHG | DIASTOLIC BLOOD PRESSURE: 86 MMHG

## 2018-10-19 VITALS — SYSTOLIC BLOOD PRESSURE: 130 MMHG | DIASTOLIC BLOOD PRESSURE: 57 MMHG

## 2018-10-19 VITALS — SYSTOLIC BLOOD PRESSURE: 132 MMHG | DIASTOLIC BLOOD PRESSURE: 63 MMHG

## 2018-10-19 VITALS — SYSTOLIC BLOOD PRESSURE: 131 MMHG | DIASTOLIC BLOOD PRESSURE: 56 MMHG

## 2018-10-19 VITALS — DIASTOLIC BLOOD PRESSURE: 76 MMHG | SYSTOLIC BLOOD PRESSURE: 137 MMHG

## 2018-10-19 LAB
ALBUMIN SERPL-MCNC: 2.8 G/DL (ref 3.5–5)
ALBUMIN/GLOB SERPL: 1.6 {RATIO} (ref 0.8–2)
ALP SERPL-CCNC: 34 IU/L (ref 40–150)
ALT SERPL-CCNC: 18 IU/L (ref 0–55)
ANION GAP SERPL CALC-SCNC: 14.9 MMOL/L (ref 8–16)
ANION GAP SERPL CALC-SCNC: 16.3 MMOL/L (ref 8–16)
BASOPHILS # BLD AUTO: 0 10*3/UL (ref 0–0.1)
BASOPHILS NFR BLD AUTO: 0.4 % (ref 0–1)
BUN SERPL-MCNC: 14 MG/DL (ref 7–26)
BUN SERPL-MCNC: 19 MG/DL (ref 7–26)
BUN/CREAT SERPL: 19 (ref 6–25)
BUN/CREAT SERPL: 26 (ref 6–25)
CALCIUM SERPL-MCNC: 8.6 MG/DL (ref 8.4–10.2)
CALCIUM SERPL-MCNC: 9.1 MG/DL (ref 8.4–10.2)
CHLORIDE SERPL-SCNC: 110 MMOL/L (ref 98–107)
CHLORIDE SERPL-SCNC: 110 MMOL/L (ref 98–107)
CO2 SERPL-SCNC: 22 MMOL/L (ref 22–29)
CO2 SERPL-SCNC: 24 MMOL/L (ref 22–29)
DEPRECATED NEUTROPHILS # BLD AUTO: 7.3 10*3/UL (ref 2.1–6.9)
EGFRCR SERPLBLD CKD-EPI 2021: > 60 ML/MIN (ref 60–?)
EGFRCR SERPLBLD CKD-EPI 2021: > 60 ML/MIN (ref 60–?)
EOSINOPHIL # BLD AUTO: 0.2 10*3/UL (ref 0–0.4)
EOSINOPHIL NFR BLD AUTO: 2 % (ref 0–6)
ERYTHROCYTE [DISTWIDTH] IN CORD BLOOD: 13.4 % (ref 11.7–14.4)
GLOBULIN PLAS-MCNC: 1.8 G/DL (ref 2.3–3.5)
GLUCOSE SERPLBLD-MCNC: 73 MG/DL (ref 74–118)
GLUCOSE SERPLBLD-MCNC: 91 MG/DL (ref 74–118)
HCT VFR BLD AUTO: 33.9 % (ref 34.2–44.1)
HGB BLD-MCNC: 11.5 G/DL (ref 12–16)
LYMPHOCYTES # BLD: 1.3 10*3/UL (ref 1–3.2)
LYMPHOCYTES NFR BLD AUTO: 13.7 % (ref 18–39.1)
MCH RBC QN AUTO: 32.8 PG (ref 28–32)
MCHC RBC AUTO-ENTMCNC: 33.9 G/DL (ref 31–35)
MCV RBC AUTO: 96.6 FL (ref 81–99)
MONOCYTES # BLD AUTO: 0.9 10*3/UL (ref 0.2–0.8)
MONOCYTES NFR BLD AUTO: 9.4 % (ref 4.4–11.3)
NEUTS SEG NFR BLD AUTO: 74.1 % (ref 38.7–80)
PLATELET # BLD AUTO: 230 X10E3/UL (ref 140–360)
POTASSIUM SERPL-SCNC: 2.9 MMOL/L (ref 3.5–5.1)
POTASSIUM SERPL-SCNC: 3.3 MMOL/L (ref 3.5–5.1)
RBC # BLD AUTO: 3.51 X10E6/UL (ref 3.6–5.1)
SODIUM SERPL-SCNC: 145 MMOL/L (ref 136–145)
SODIUM SERPL-SCNC: 146 MMOL/L (ref 136–145)

## 2018-10-19 RX ADMIN — SODIUM CHLORIDE SCH ML: 900 IRRIGANT IRRIGATION at 18:30

## 2018-10-19 RX ADMIN — SODIUM CHLORIDE SCH MG: 900 INJECTION INTRAVENOUS at 22:33

## 2018-10-19 RX ADMIN — POTASSIUM CHLORIDE AND SODIUM CHLORIDE SCH MLS/HR: 900; 150 INJECTION, SOLUTION INTRAVENOUS at 08:15

## 2018-10-19 RX ADMIN — POTASSIUM CHLORIDE AND SODIUM CHLORIDE SCH MLS/HR: 900; 150 INJECTION, SOLUTION INTRAVENOUS at 18:15

## 2018-10-19 RX ADMIN — SODIUM CHLORIDE SCH ML: 900 IRRIGANT IRRIGATION at 06:00

## 2018-10-19 RX ADMIN — TAZOBACTAM SODIUM AND PIPERACILLIN SODIUM SCH MLS/HR: 375; 3 INJECTION, SOLUTION INTRAVENOUS at 01:07

## 2018-10-19 RX ADMIN — TAZOBACTAM SODIUM AND PIPERACILLIN SODIUM SCH MLS/HR: 375; 3 INJECTION, SOLUTION INTRAVENOUS at 18:30

## 2018-10-19 RX ADMIN — SODIUM CHLORIDE SCH ML: 900 IRRIGANT IRRIGATION at 14:15

## 2018-10-19 RX ADMIN — TAZOBACTAM SODIUM AND PIPERACILLIN SODIUM SCH MLS/HR: 375; 3 INJECTION, SOLUTION INTRAVENOUS at 06:19

## 2018-10-19 RX ADMIN — TAZOBACTAM SODIUM AND PIPERACILLIN SODIUM SCH MLS/HR: 375; 3 INJECTION, SOLUTION INTRAVENOUS at 12:13

## 2018-10-19 RX ADMIN — SODIUM CHLORIDE SCH ML: 900 IRRIGANT IRRIGATION at 10:15

## 2018-10-19 RX ADMIN — SODIUM CHLORIDE SCH ML: 900 IRRIGANT IRRIGATION at 22:33

## 2018-10-19 RX ADMIN — SODIUM CHLORIDE SCH ML: 900 IRRIGANT IRRIGATION at 02:27

## 2018-10-19 NOTE — CONSULTATION
DATE OF CONSULTATION:  October 19, 2018 



CARDIOLOGY CONSULTATION



REQUESTING PHYSICIAN:  Dr. Marni Fragoso.



REASON FOR CONSULTATION:  Atrial fibrillation with rapid ventricular 

response.



HISTORY OF PRESENT ILLNESS:  This is an 86-year-old woman with history of 

aortic valve endocarditis; paroxysmal atrial fibrillation, on Eliquis; 

history of brain aneurysm, status post ligation in 2004; venous 

insufficiency; hypertension; history of DVT/PE, who presented with 

complaints of nausea and vomiting.  Patient reports she began having nausea 

and vomiting of bilious material last Saturday.  She passed some flatus, 

but did not have any bowel movements.  She subsequently presented to the ER 

for further evaluation.  She was found to have a small bowel obstruction 

and admitted for further evaluation.  Patient was planned for ex-lap and 

possible bowel resection by Dr. Acevedo; however, she developed atrial 

fibrillation with rapid ventricular response preoperatively and the 

procedure was cancelled.  Cardiology was consulted for further evaluation.  

The patient denies any chest pain, shortness of breath, palpitations, 

edema, orthopnea, or PND.



REVIEW OF SYSTEMS:  Negative except as per HPI.



PAST MEDICAL HISTORY

1. Aortic valve endocarditis.

2. Paroxysmal atrial fibrillation, on Eliquis.

3. History of DVT/PE, status post IVC filter.

4. History of brain aneurysm, status post ligation.

5. Hypertension.

6. COPD/GERD.

7. Venous insufficiency.



PAST SURGICAL HISTORY

1. Appendectomy.

2. Hysterectomy.

3. Partial colectomy with colostomy.

4. Colostomy reversal.

5. Knee surgery x2.

6. Thyroid surgery.

7. Hip repair.



ALLERGIES:  NO KNOWN DRUG ALLERGIES.



MEDICATIONS:  Please see EMR.



SOCIAL HISTORY:  Denies tobacco, alcohol, or illicit drugs.



FAMILY HISTORY:  Noncontributory to current illness.



PHYSICAL EXAM

VITAL SIGNS:  Temperature 98.1 degrees, pulse 67, respiratory rate 18, 

blood pressure 145/58, oxygen saturation 92% on 5 L face mask.

GENERAL:  An elderly woman, no acute distress.

HEENT:  Normocephalic, atraumatic.  Pupils are equal.  No scleral icterus.

NECK:  Supple.  No thyromegaly or cervical lymphadenopathy.  No carotid 

bruits.

LUNGS:  Clear to auscultation bilaterally.  No wheezes or crackles.

CARDIOVASCULAR:  Normal rate, regular rhythm.  Harsh 4/6 systolic murmur 

best appreciated at the upper sternal borders with radiation to the 

carotid.

ABDOMEN:  Soft, tender to palpation, nondistended.

EXTREMITIES:  No edema.

NEURO:  Nonfocal exam.



LABS:  WBC 9.79, hemoglobin 11.5, hematocrit 32.9, platelets 230.  Sodium 

146, potassium 2.9, chloride 110, CO2 of 24, BUN 19, creatinine 0.74.  

Chest x-ray, findings are compatible with left lower lobe developing 

pneumonia with associated small left pleural effusion.  EKG, atrial 

fibrillation, rapid ventricular response with premature apparently 

conducted complexes, marked ST abnormality, possible inferior 

sub-endocardial injury.



TELEMETRY:  Normal sinus rhythm.



IMPRESSION

1. Paroxysmal atrial fibrillation with rapid ventricular response.

2. History of aortic valve endocarditis.

3. Hypertension.

4. History of deep venous thrombosis/pulmonary embolism, status post 

inferior vena cava filter.

5. History of brain aneurysm, status post ligation.

6. Small bowel obstruction.

7. Venous insufficiency.



RECOMMENDATIONS:  Continue patient on amiodarone drip.  Start scheduled 

metoprolol for rate control as patient is n.p.o. due to her small bowel 

obstruction.  We will review patient's recent cardiac evaluation from the 

office.  Hold anticoagulation given plan for surgery.



Thank you for this consult.  We will continue to follow.









DD:  10/19/2018 18:53

DT:  10/19/2018 19:35

Job#:  [id number] CALE CAZARES

## 2018-10-20 VITALS — DIASTOLIC BLOOD PRESSURE: 72 MMHG | SYSTOLIC BLOOD PRESSURE: 156 MMHG

## 2018-10-20 VITALS — SYSTOLIC BLOOD PRESSURE: 128 MMHG | DIASTOLIC BLOOD PRESSURE: 58 MMHG

## 2018-10-20 VITALS — SYSTOLIC BLOOD PRESSURE: 137 MMHG | DIASTOLIC BLOOD PRESSURE: 62 MMHG

## 2018-10-20 VITALS — DIASTOLIC BLOOD PRESSURE: 70 MMHG | SYSTOLIC BLOOD PRESSURE: 179 MMHG

## 2018-10-20 VITALS — DIASTOLIC BLOOD PRESSURE: 55 MMHG | SYSTOLIC BLOOD PRESSURE: 130 MMHG

## 2018-10-20 VITALS — DIASTOLIC BLOOD PRESSURE: 69 MMHG | SYSTOLIC BLOOD PRESSURE: 168 MMHG

## 2018-10-20 VITALS — SYSTOLIC BLOOD PRESSURE: 138 MMHG | DIASTOLIC BLOOD PRESSURE: 61 MMHG

## 2018-10-20 VITALS — DIASTOLIC BLOOD PRESSURE: 70 MMHG | SYSTOLIC BLOOD PRESSURE: 155 MMHG

## 2018-10-20 VITALS — DIASTOLIC BLOOD PRESSURE: 78 MMHG | SYSTOLIC BLOOD PRESSURE: 143 MMHG

## 2018-10-20 VITALS — SYSTOLIC BLOOD PRESSURE: 153 MMHG | DIASTOLIC BLOOD PRESSURE: 62 MMHG

## 2018-10-20 VITALS — DIASTOLIC BLOOD PRESSURE: 74 MMHG | SYSTOLIC BLOOD PRESSURE: 158 MMHG

## 2018-10-20 VITALS — SYSTOLIC BLOOD PRESSURE: 150 MMHG | DIASTOLIC BLOOD PRESSURE: 74 MMHG

## 2018-10-20 VITALS — DIASTOLIC BLOOD PRESSURE: 64 MMHG | SYSTOLIC BLOOD PRESSURE: 137 MMHG

## 2018-10-20 VITALS — DIASTOLIC BLOOD PRESSURE: 62 MMHG | SYSTOLIC BLOOD PRESSURE: 141 MMHG

## 2018-10-20 VITALS — DIASTOLIC BLOOD PRESSURE: 66 MMHG | SYSTOLIC BLOOD PRESSURE: 140 MMHG

## 2018-10-20 VITALS — SYSTOLIC BLOOD PRESSURE: 145 MMHG | DIASTOLIC BLOOD PRESSURE: 64 MMHG

## 2018-10-20 VITALS — DIASTOLIC BLOOD PRESSURE: 80 MMHG | SYSTOLIC BLOOD PRESSURE: 139 MMHG

## 2018-10-20 VITALS — DIASTOLIC BLOOD PRESSURE: 86 MMHG | SYSTOLIC BLOOD PRESSURE: 138 MMHG

## 2018-10-20 VITALS — SYSTOLIC BLOOD PRESSURE: 133 MMHG | DIASTOLIC BLOOD PRESSURE: 60 MMHG

## 2018-10-20 VITALS — DIASTOLIC BLOOD PRESSURE: 69 MMHG | SYSTOLIC BLOOD PRESSURE: 161 MMHG

## 2018-10-20 VITALS — SYSTOLIC BLOOD PRESSURE: 172 MMHG | DIASTOLIC BLOOD PRESSURE: 89 MMHG

## 2018-10-20 VITALS — DIASTOLIC BLOOD PRESSURE: 65 MMHG | SYSTOLIC BLOOD PRESSURE: 167 MMHG

## 2018-10-20 VITALS — DIASTOLIC BLOOD PRESSURE: 69 MMHG | SYSTOLIC BLOOD PRESSURE: 149 MMHG

## 2018-10-20 VITALS — SYSTOLIC BLOOD PRESSURE: 142 MMHG | DIASTOLIC BLOOD PRESSURE: 68 MMHG

## 2018-10-20 VITALS — DIASTOLIC BLOOD PRESSURE: 79 MMHG | SYSTOLIC BLOOD PRESSURE: 168 MMHG

## 2018-10-20 VITALS — SYSTOLIC BLOOD PRESSURE: 164 MMHG | DIASTOLIC BLOOD PRESSURE: 66 MMHG

## 2018-10-20 VITALS — SYSTOLIC BLOOD PRESSURE: 165 MMHG | DIASTOLIC BLOOD PRESSURE: 64 MMHG

## 2018-10-20 VITALS — DIASTOLIC BLOOD PRESSURE: 77 MMHG | SYSTOLIC BLOOD PRESSURE: 140 MMHG

## 2018-10-20 VITALS — SYSTOLIC BLOOD PRESSURE: 160 MMHG | DIASTOLIC BLOOD PRESSURE: 70 MMHG

## 2018-10-20 VITALS — DIASTOLIC BLOOD PRESSURE: 61 MMHG | SYSTOLIC BLOOD PRESSURE: 144 MMHG

## 2018-10-20 VITALS — SYSTOLIC BLOOD PRESSURE: 134 MMHG | DIASTOLIC BLOOD PRESSURE: 60 MMHG

## 2018-10-20 VITALS — DIASTOLIC BLOOD PRESSURE: 86 MMHG | SYSTOLIC BLOOD PRESSURE: 168 MMHG

## 2018-10-20 VITALS — DIASTOLIC BLOOD PRESSURE: 99 MMHG | SYSTOLIC BLOOD PRESSURE: 111 MMHG

## 2018-10-20 VITALS — DIASTOLIC BLOOD PRESSURE: 57 MMHG | SYSTOLIC BLOOD PRESSURE: 150 MMHG

## 2018-10-20 VITALS — DIASTOLIC BLOOD PRESSURE: 60 MMHG | SYSTOLIC BLOOD PRESSURE: 145 MMHG

## 2018-10-20 VITALS — SYSTOLIC BLOOD PRESSURE: 155 MMHG | DIASTOLIC BLOOD PRESSURE: 66 MMHG

## 2018-10-20 VITALS — DIASTOLIC BLOOD PRESSURE: 57 MMHG | SYSTOLIC BLOOD PRESSURE: 138 MMHG

## 2018-10-20 VITALS — DIASTOLIC BLOOD PRESSURE: 69 MMHG | SYSTOLIC BLOOD PRESSURE: 159 MMHG

## 2018-10-20 VITALS — DIASTOLIC BLOOD PRESSURE: 65 MMHG | SYSTOLIC BLOOD PRESSURE: 145 MMHG

## 2018-10-20 VITALS — DIASTOLIC BLOOD PRESSURE: 60 MMHG | SYSTOLIC BLOOD PRESSURE: 152 MMHG

## 2018-10-20 VITALS — SYSTOLIC BLOOD PRESSURE: 122 MMHG | DIASTOLIC BLOOD PRESSURE: 52 MMHG

## 2018-10-20 VITALS — DIASTOLIC BLOOD PRESSURE: 85 MMHG | SYSTOLIC BLOOD PRESSURE: 138 MMHG

## 2018-10-20 VITALS — DIASTOLIC BLOOD PRESSURE: 69 MMHG | SYSTOLIC BLOOD PRESSURE: 166 MMHG

## 2018-10-20 VITALS — DIASTOLIC BLOOD PRESSURE: 65 MMHG | SYSTOLIC BLOOD PRESSURE: 144 MMHG

## 2018-10-20 VITALS — DIASTOLIC BLOOD PRESSURE: 66 MMHG | SYSTOLIC BLOOD PRESSURE: 153 MMHG

## 2018-10-20 VITALS — SYSTOLIC BLOOD PRESSURE: 169 MMHG | DIASTOLIC BLOOD PRESSURE: 69 MMHG

## 2018-10-20 VITALS — SYSTOLIC BLOOD PRESSURE: 150 MMHG | DIASTOLIC BLOOD PRESSURE: 60 MMHG

## 2018-10-20 VITALS — DIASTOLIC BLOOD PRESSURE: 70 MMHG | SYSTOLIC BLOOD PRESSURE: 148 MMHG

## 2018-10-20 VITALS — DIASTOLIC BLOOD PRESSURE: 63 MMHG | SYSTOLIC BLOOD PRESSURE: 157 MMHG

## 2018-10-20 VITALS — DIASTOLIC BLOOD PRESSURE: 60 MMHG | SYSTOLIC BLOOD PRESSURE: 129 MMHG

## 2018-10-20 VITALS — SYSTOLIC BLOOD PRESSURE: 125 MMHG | DIASTOLIC BLOOD PRESSURE: 58 MMHG

## 2018-10-20 VITALS — SYSTOLIC BLOOD PRESSURE: 172 MMHG | DIASTOLIC BLOOD PRESSURE: 72 MMHG

## 2018-10-20 VITALS — SYSTOLIC BLOOD PRESSURE: 176 MMHG | DIASTOLIC BLOOD PRESSURE: 79 MMHG

## 2018-10-20 VITALS — DIASTOLIC BLOOD PRESSURE: 69 MMHG | SYSTOLIC BLOOD PRESSURE: 169 MMHG

## 2018-10-20 VITALS — SYSTOLIC BLOOD PRESSURE: 152 MMHG | DIASTOLIC BLOOD PRESSURE: 66 MMHG

## 2018-10-20 VITALS — DIASTOLIC BLOOD PRESSURE: 55 MMHG | SYSTOLIC BLOOD PRESSURE: 127 MMHG

## 2018-10-20 VITALS — DIASTOLIC BLOOD PRESSURE: 75 MMHG | SYSTOLIC BLOOD PRESSURE: 149 MMHG

## 2018-10-20 VITALS — SYSTOLIC BLOOD PRESSURE: 139 MMHG | DIASTOLIC BLOOD PRESSURE: 61 MMHG

## 2018-10-20 VITALS — DIASTOLIC BLOOD PRESSURE: 55 MMHG | SYSTOLIC BLOOD PRESSURE: 132 MMHG

## 2018-10-20 VITALS — DIASTOLIC BLOOD PRESSURE: 63 MMHG | SYSTOLIC BLOOD PRESSURE: 155 MMHG

## 2018-10-20 VITALS — SYSTOLIC BLOOD PRESSURE: 145 MMHG | DIASTOLIC BLOOD PRESSURE: 68 MMHG

## 2018-10-20 VITALS — DIASTOLIC BLOOD PRESSURE: 72 MMHG | SYSTOLIC BLOOD PRESSURE: 157 MMHG

## 2018-10-20 VITALS — SYSTOLIC BLOOD PRESSURE: 161 MMHG | DIASTOLIC BLOOD PRESSURE: 78 MMHG

## 2018-10-20 VITALS — DIASTOLIC BLOOD PRESSURE: 69 MMHG | SYSTOLIC BLOOD PRESSURE: 148 MMHG

## 2018-10-20 VITALS — DIASTOLIC BLOOD PRESSURE: 77 MMHG | SYSTOLIC BLOOD PRESSURE: 169 MMHG

## 2018-10-20 VITALS — DIASTOLIC BLOOD PRESSURE: 67 MMHG | SYSTOLIC BLOOD PRESSURE: 159 MMHG

## 2018-10-20 VITALS — DIASTOLIC BLOOD PRESSURE: 61 MMHG | SYSTOLIC BLOOD PRESSURE: 145 MMHG

## 2018-10-20 VITALS — SYSTOLIC BLOOD PRESSURE: 136 MMHG | DIASTOLIC BLOOD PRESSURE: 55 MMHG

## 2018-10-20 VITALS — DIASTOLIC BLOOD PRESSURE: 81 MMHG | SYSTOLIC BLOOD PRESSURE: 164 MMHG

## 2018-10-20 VITALS — SYSTOLIC BLOOD PRESSURE: 145 MMHG | DIASTOLIC BLOOD PRESSURE: 61 MMHG

## 2018-10-20 VITALS — SYSTOLIC BLOOD PRESSURE: 162 MMHG | DIASTOLIC BLOOD PRESSURE: 70 MMHG

## 2018-10-20 VITALS — DIASTOLIC BLOOD PRESSURE: 64 MMHG | SYSTOLIC BLOOD PRESSURE: 141 MMHG

## 2018-10-20 VITALS — DIASTOLIC BLOOD PRESSURE: 83 MMHG | SYSTOLIC BLOOD PRESSURE: 137 MMHG

## 2018-10-20 VITALS — SYSTOLIC BLOOD PRESSURE: 153 MMHG | DIASTOLIC BLOOD PRESSURE: 58 MMHG

## 2018-10-20 VITALS — DIASTOLIC BLOOD PRESSURE: 69 MMHG | SYSTOLIC BLOOD PRESSURE: 145 MMHG

## 2018-10-20 VITALS — DIASTOLIC BLOOD PRESSURE: 59 MMHG | SYSTOLIC BLOOD PRESSURE: 139 MMHG

## 2018-10-20 VITALS — SYSTOLIC BLOOD PRESSURE: 134 MMHG | DIASTOLIC BLOOD PRESSURE: 62 MMHG

## 2018-10-20 VITALS — DIASTOLIC BLOOD PRESSURE: 64 MMHG | SYSTOLIC BLOOD PRESSURE: 159 MMHG

## 2018-10-20 VITALS — DIASTOLIC BLOOD PRESSURE: 72 MMHG | SYSTOLIC BLOOD PRESSURE: 160 MMHG

## 2018-10-20 VITALS — SYSTOLIC BLOOD PRESSURE: 171 MMHG | DIASTOLIC BLOOD PRESSURE: 68 MMHG

## 2018-10-20 VITALS — DIASTOLIC BLOOD PRESSURE: 65 MMHG | SYSTOLIC BLOOD PRESSURE: 155 MMHG

## 2018-10-20 VITALS — DIASTOLIC BLOOD PRESSURE: 51 MMHG | SYSTOLIC BLOOD PRESSURE: 126 MMHG

## 2018-10-20 VITALS — DIASTOLIC BLOOD PRESSURE: 103 MMHG | SYSTOLIC BLOOD PRESSURE: 113 MMHG

## 2018-10-20 LAB
ALBUMIN SERPL-MCNC: 2.6 G/DL (ref 3.5–5)
ALBUMIN/GLOB SERPL: 1.2 {RATIO} (ref 0.8–2)
ALP SERPL-CCNC: 35 IU/L (ref 40–150)
ALT SERPL-CCNC: 15 IU/L (ref 0–55)
ANION GAP SERPL CALC-SCNC: 15.2 MMOL/L (ref 8–16)
BASOPHILS # BLD AUTO: 0.1 10*3/UL (ref 0–0.1)
BASOPHILS NFR BLD AUTO: 0.4 % (ref 0–1)
BUN SERPL-MCNC: 13 MG/DL (ref 7–26)
BUN/CREAT SERPL: 18 (ref 6–25)
CALCIUM SERPL-MCNC: 8.9 MG/DL (ref 8.4–10.2)
CHLORIDE SERPL-SCNC: 112 MMOL/L (ref 98–107)
CO2 SERPL-SCNC: 22 MMOL/L (ref 22–29)
DEPRECATED NEUTROPHILS # BLD AUTO: 13.1 10*3/UL (ref 2.1–6.9)
EGFRCR SERPLBLD CKD-EPI 2021: > 60 ML/MIN (ref 60–?)
EOSINOPHIL # BLD AUTO: 0.3 10*3/UL (ref 0–0.4)
EOSINOPHIL NFR BLD AUTO: 2 % (ref 0–6)
ERYTHROCYTE [DISTWIDTH] IN CORD BLOOD: 13.7 % (ref 11.7–14.4)
GLOBULIN PLAS-MCNC: 2.1 G/DL (ref 2.3–3.5)
GLUCOSE SERPLBLD-MCNC: 67 MG/DL (ref 74–118)
HCT VFR BLD AUTO: 31.3 % (ref 34.2–44.1)
HGB BLD-MCNC: 10.4 G/DL (ref 12–16)
LYMPHOCYTES # BLD: 1 10*3/UL (ref 1–3.2)
LYMPHOCYTES NFR BLD AUTO: 6.6 % (ref 18–39.1)
MCH RBC QN AUTO: 33.2 PG (ref 28–32)
MCHC RBC AUTO-ENTMCNC: 33.2 G/DL (ref 31–35)
MCV RBC AUTO: 100 FL (ref 81–99)
MONOCYTES # BLD AUTO: 1 10*3/UL (ref 0.2–0.8)
MONOCYTES NFR BLD AUTO: 6.6 % (ref 4.4–11.3)
NEUTS SEG NFR BLD AUTO: 83.9 % (ref 38.7–80)
PLATELET # BLD AUTO: 202 X10E3/UL (ref 140–360)
POTASSIUM SERPL-SCNC: 3.2 MMOL/L (ref 3.5–5.1)
RBC # BLD AUTO: 3.13 X10E6/UL (ref 3.6–5.1)
SODIUM SERPL-SCNC: 146 MMOL/L (ref 136–145)

## 2018-10-20 PROCEDURE — 02HV33Z INSERTION OF INFUSION DEVICE INTO SUPERIOR VENA CAVA, PERCUTANEOUS APPROACH: ICD-10-PCS | Performed by: INTERNAL MEDICINE

## 2018-10-20 RX ADMIN — SODIUM CHLORIDE SCH ML: 900 IRRIGANT IRRIGATION at 03:13

## 2018-10-20 RX ADMIN — SODIUM CHLORIDE SCH ML: 900 IRRIGANT IRRIGATION at 10:15

## 2018-10-20 RX ADMIN — VANCOMYCIN HYDROCHLORIDE SCH MLS/HR: 1 INJECTION, SOLUTION INTRAVENOUS at 11:00

## 2018-10-20 RX ADMIN — DEXTROSE, SODIUM CHLORIDE, AND POTASSIUM CHLORIDE SCH MLS/HR: 5; .9; .15 INJECTION INTRAVENOUS at 20:47

## 2018-10-20 RX ADMIN — Medication SCH GM: at 09:15

## 2018-10-20 RX ADMIN — DEXTROSE, SODIUM CHLORIDE, AND POTASSIUM CHLORIDE SCH MLS/HR: 5; .9; .15 INJECTION INTRAVENOUS at 11:40

## 2018-10-20 RX ADMIN — POTASSIUM CHLORIDE AND SODIUM CHLORIDE SCH MLS/HR: 900; 150 INJECTION, SOLUTION INTRAVENOUS at 04:01

## 2018-10-20 RX ADMIN — VANCOMYCIN HYDROCHLORIDE SCH MLS/HR: 1 INJECTION, SOLUTION INTRAVENOUS at 22:20

## 2018-10-20 RX ADMIN — SODIUM CHLORIDE SCH GM: 9 INJECTION, SOLUTION INTRAVENOUS at 22:18

## 2018-10-20 RX ADMIN — SODIUM CHLORIDE SCH ML: 900 IRRIGANT IRRIGATION at 22:18

## 2018-10-20 RX ADMIN — TAZOBACTAM SODIUM AND PIPERACILLIN SODIUM SCH MLS/HR: 375; 3 INJECTION, SOLUTION INTRAVENOUS at 00:15

## 2018-10-20 RX ADMIN — SODIUM CHLORIDE SCH ML: 900 IRRIGANT IRRIGATION at 18:15

## 2018-10-20 RX ADMIN — SODIUM CHLORIDE SCH GM: 9 INJECTION, SOLUTION INTRAVENOUS at 12:28

## 2018-10-20 RX ADMIN — SODIUM CHLORIDE SCH ML: 900 IRRIGANT IRRIGATION at 14:15

## 2018-10-20 RX ADMIN — SODIUM CHLORIDE SCH ML: 900 IRRIGANT IRRIGATION at 06:15

## 2018-10-20 RX ADMIN — TAZOBACTAM SODIUM AND PIPERACILLIN SODIUM SCH MLS/HR: 375; 3 INJECTION, SOLUTION INTRAVENOUS at 05:25

## 2018-10-20 RX ADMIN — SODIUM CHLORIDE SCH MG: 900 INJECTION INTRAVENOUS at 22:18

## 2018-10-20 NOTE — PROGRESS NOTE
DATE:  October 20, 2018 



CARDIOLOGY PROGRESS NOTE



SUBJECTIVE:  No major events overnight.  No abdominal pain, chest pain, or 

shortness of breath.



OBJECTIVE

VITAL SIGNS:  Temperature afebrile, pulse 62, respiratory rate 16, blood 

pressure 134/62, satting 98% on 2 liters nasal cannula. 

GENERAL:  Elderly white female, thin, ill-appearing.

CARDIOVASCULAR:  Regular rate and rhythm.  There is a 3/6 systolic murmur 

heard in the right upper and left upper sternal borders without significant 

diminishment of carotid upstroke.  There is also holosystolic murmur heard 

in the left lower sternal border consistent with tricuspid regurgitation.  

Palpable carotid pulses.  Palpable radial pulses.

EXTREMITIES:  No lower ulcers or varicosities. 

LUNGS:  Clear to auscultation bilaterally.

ABDOMEN:  Soft, mildly distended, nontender.  No masses. 

NEURO AND PSYCH:  Alert and oriented to person, place, and time.  Normal 

affect. 



LABORATORY DATA:  Reviewed.  Notable for white count of 15.6.



IMAGING DATA:  Reviewed.



TELEMETRY DATA:  Reviewed shows normal sinus rhythm.



INPATIENT MEDICATIONS:  Reviewed.



ASSESSMENT AND PLAN

1. Paroxysmal atrial fibrillation with left ventricular response.

2. History of aortic valve endocarditis in 2016, status post antibiotics 

and no need for surgery.

3. Hypertension.

4. History of deep venous thrombosis and pulmonary embolism, status post 

inferior vena cava filter.

5. History of chronic venous insufficiency and venous ulcerations, 

status post vein ablation in the past.

6. History of brain aneurysm, status post ligation.

7. Small bowel obstruction.

8. Abnormal EKG.



PLAN:  Patient is converted to normal sinus rhythm.  Would continue 

amiodarone drip as she is not able to take p.o. medicines given her small 

bowel obstruction and amiodarone should help control her heart rate and 

rhythm in the perioperative period.  P.r.n. IV metoprolol as needed.  

Reviewed the patient's records from the office, she had normal perfusion 

scan done in 2017, has not had a cardiac catheterization previously, no 

history of CAD.  We will repeat echocardiogram prior to her surgery.  She 

will be at moderate risk for perioperative cardiovascular events for 

abdominal surgery.  Currently, she is euvolemic and does not have any 

unstable cardiovascular symptoms.



Thank you for this consult.  We will continue to follow.









DD:  10/20/2018 16:32

DT:  10/20/2018 19:19

Job#:  [id number] VAS

## 2018-10-20 NOTE — DIAGNOSTIC IMAGING REPORT
EXAMINATION:  CHEST SINGLE (PORTABLE)    



INDICATION:      ^LLL pneumonia



COMPARISON:  Chest x-ray 10/18/2018

     

FINDINGS:  AP view   



TUBES and LINES:  NG tube in the stomach.



LUNGS:  Lungs are well inflated.  There are bibasilar atelectasis. There is

slight increase in mild prominence of the central pulmonary vasculature,

consistent with pulmonary venous congestion. Left lower lobe consolidation is

unchanged.



PLEURA:  Stable to minimally increased small bilateral pleural effusions, left

greater than right.



HEART AND MEDIASTINUM:  The cardiomediastinal silhouette is unremarkable.    



BONES AND SOFT TISSUES:  No acute osseous lesion. Degenerative changes in

bilateral glenohumeral joints, left greater than right. Soft tissues are

unremarkable.



UPPER ABDOMEN: No free air under the diaphragm.    



IMPRESSION: 

1.  Interval development and mild central pulmonary venous congestion and

slight increase in small bilateral pleural effusions.

2.  Left lower lobe pneumonia remains present.





Signed by: Dr. Ralph Rasmussen M.D. on 10/20/2018 11:21 AM

## 2018-10-20 NOTE — DIAGNOSTIC IMAGING REPORT
EXAMINATION:  CHEST XRAY LINE PLACEMENT    



INDICATION:      ^new picc line



COMPARISON:  Chest x-ray 10/20/2018 at 1048

     

FINDINGS:  AP view   



TUBES and LINES:  

New right PICC line with tip at mid SVC.

NG tube is in the stomach.



LUNGS:  Lungs are well inflated.  There are bibasilar atelectasis. There is

unchanged mild prominence of the central pulmonary vasculature, consistent with

pulmonary venous congestion. Left lower lobe consolidation is unchanged.



PLEURA:  Stable small bilateral pleural effusions, left greater than right.



HEART AND MEDIASTINUM:  The cardiomediastinal silhouette is unremarkable.    



BONES AND SOFT TISSUES:  No acute osseous lesion. Degenerative changes in

bilateral glenohumeral joints, left greater than right. Soft tissues are

unremarkable.



UPPER ABDOMEN: No free air under the diaphragm.    



IMPRESSION: 

1.  New right PICC line with tip at mid SVC.

2.  Stable mild central pulmonary venous congestion and small bilateral pleural

effusions.

3.  Left lower lobe pneumonia remains present.



Signed by: Dr. Ralph Rasmussen M.D. on 10/20/2018 3:19 PM

## 2018-10-21 VITALS — DIASTOLIC BLOOD PRESSURE: 87 MMHG | SYSTOLIC BLOOD PRESSURE: 139 MMHG

## 2018-10-21 VITALS — DIASTOLIC BLOOD PRESSURE: 90 MMHG | SYSTOLIC BLOOD PRESSURE: 159 MMHG

## 2018-10-21 VITALS — DIASTOLIC BLOOD PRESSURE: 62 MMHG | SYSTOLIC BLOOD PRESSURE: 149 MMHG

## 2018-10-21 VITALS — SYSTOLIC BLOOD PRESSURE: 139 MMHG | DIASTOLIC BLOOD PRESSURE: 71 MMHG

## 2018-10-21 VITALS — SYSTOLIC BLOOD PRESSURE: 147 MMHG | DIASTOLIC BLOOD PRESSURE: 71 MMHG

## 2018-10-21 VITALS — SYSTOLIC BLOOD PRESSURE: 170 MMHG | DIASTOLIC BLOOD PRESSURE: 74 MMHG

## 2018-10-21 VITALS — DIASTOLIC BLOOD PRESSURE: 80 MMHG | SYSTOLIC BLOOD PRESSURE: 169 MMHG

## 2018-10-21 VITALS — DIASTOLIC BLOOD PRESSURE: 97 MMHG | SYSTOLIC BLOOD PRESSURE: 152 MMHG

## 2018-10-21 VITALS — SYSTOLIC BLOOD PRESSURE: 171 MMHG | DIASTOLIC BLOOD PRESSURE: 72 MMHG

## 2018-10-21 VITALS — SYSTOLIC BLOOD PRESSURE: 148 MMHG | DIASTOLIC BLOOD PRESSURE: 65 MMHG

## 2018-10-21 VITALS — SYSTOLIC BLOOD PRESSURE: 174 MMHG | DIASTOLIC BLOOD PRESSURE: 78 MMHG

## 2018-10-21 VITALS — DIASTOLIC BLOOD PRESSURE: 93 MMHG | SYSTOLIC BLOOD PRESSURE: 161 MMHG

## 2018-10-21 VITALS — SYSTOLIC BLOOD PRESSURE: 153 MMHG | DIASTOLIC BLOOD PRESSURE: 67 MMHG

## 2018-10-21 VITALS — DIASTOLIC BLOOD PRESSURE: 76 MMHG | SYSTOLIC BLOOD PRESSURE: 168 MMHG

## 2018-10-21 VITALS — DIASTOLIC BLOOD PRESSURE: 69 MMHG | SYSTOLIC BLOOD PRESSURE: 148 MMHG

## 2018-10-21 VITALS — DIASTOLIC BLOOD PRESSURE: 79 MMHG | SYSTOLIC BLOOD PRESSURE: 161 MMHG

## 2018-10-21 VITALS — SYSTOLIC BLOOD PRESSURE: 163 MMHG | DIASTOLIC BLOOD PRESSURE: 101 MMHG

## 2018-10-21 VITALS — SYSTOLIC BLOOD PRESSURE: 169 MMHG | DIASTOLIC BLOOD PRESSURE: 83 MMHG

## 2018-10-21 VITALS — SYSTOLIC BLOOD PRESSURE: 159 MMHG | DIASTOLIC BLOOD PRESSURE: 79 MMHG

## 2018-10-21 VITALS — SYSTOLIC BLOOD PRESSURE: 162 MMHG | DIASTOLIC BLOOD PRESSURE: 99 MMHG

## 2018-10-21 VITALS — SYSTOLIC BLOOD PRESSURE: 156 MMHG | DIASTOLIC BLOOD PRESSURE: 62 MMHG

## 2018-10-21 VITALS — SYSTOLIC BLOOD PRESSURE: 156 MMHG | DIASTOLIC BLOOD PRESSURE: 64 MMHG

## 2018-10-21 VITALS — DIASTOLIC BLOOD PRESSURE: 63 MMHG | SYSTOLIC BLOOD PRESSURE: 137 MMHG

## 2018-10-21 VITALS — DIASTOLIC BLOOD PRESSURE: 64 MMHG | SYSTOLIC BLOOD PRESSURE: 151 MMHG

## 2018-10-21 VITALS — DIASTOLIC BLOOD PRESSURE: 73 MMHG | SYSTOLIC BLOOD PRESSURE: 166 MMHG

## 2018-10-21 VITALS — SYSTOLIC BLOOD PRESSURE: 146 MMHG | DIASTOLIC BLOOD PRESSURE: 83 MMHG

## 2018-10-21 VITALS — DIASTOLIC BLOOD PRESSURE: 68 MMHG | SYSTOLIC BLOOD PRESSURE: 146 MMHG

## 2018-10-21 VITALS — DIASTOLIC BLOOD PRESSURE: 80 MMHG | SYSTOLIC BLOOD PRESSURE: 144 MMHG

## 2018-10-21 VITALS — SYSTOLIC BLOOD PRESSURE: 156 MMHG | DIASTOLIC BLOOD PRESSURE: 70 MMHG

## 2018-10-21 VITALS — SYSTOLIC BLOOD PRESSURE: 153 MMHG | DIASTOLIC BLOOD PRESSURE: 63 MMHG

## 2018-10-21 VITALS — DIASTOLIC BLOOD PRESSURE: 60 MMHG | SYSTOLIC BLOOD PRESSURE: 151 MMHG

## 2018-10-21 VITALS — DIASTOLIC BLOOD PRESSURE: 60 MMHG | SYSTOLIC BLOOD PRESSURE: 143 MMHG

## 2018-10-21 VITALS — SYSTOLIC BLOOD PRESSURE: 176 MMHG | DIASTOLIC BLOOD PRESSURE: 76 MMHG

## 2018-10-21 VITALS — SYSTOLIC BLOOD PRESSURE: 149 MMHG | DIASTOLIC BLOOD PRESSURE: 66 MMHG

## 2018-10-21 VITALS — SYSTOLIC BLOOD PRESSURE: 151 MMHG | DIASTOLIC BLOOD PRESSURE: 69 MMHG

## 2018-10-21 VITALS — DIASTOLIC BLOOD PRESSURE: 63 MMHG | SYSTOLIC BLOOD PRESSURE: 142 MMHG

## 2018-10-21 VITALS — DIASTOLIC BLOOD PRESSURE: 83 MMHG | SYSTOLIC BLOOD PRESSURE: 151 MMHG

## 2018-10-21 VITALS — SYSTOLIC BLOOD PRESSURE: 176 MMHG | DIASTOLIC BLOOD PRESSURE: 86 MMHG

## 2018-10-21 VITALS — SYSTOLIC BLOOD PRESSURE: 154 MMHG | DIASTOLIC BLOOD PRESSURE: 85 MMHG

## 2018-10-21 VITALS — DIASTOLIC BLOOD PRESSURE: 71 MMHG | SYSTOLIC BLOOD PRESSURE: 162 MMHG

## 2018-10-21 VITALS — SYSTOLIC BLOOD PRESSURE: 150 MMHG | DIASTOLIC BLOOD PRESSURE: 63 MMHG

## 2018-10-21 VITALS — SYSTOLIC BLOOD PRESSURE: 153 MMHG | DIASTOLIC BLOOD PRESSURE: 73 MMHG

## 2018-10-21 VITALS — DIASTOLIC BLOOD PRESSURE: 65 MMHG | SYSTOLIC BLOOD PRESSURE: 155 MMHG

## 2018-10-21 VITALS — SYSTOLIC BLOOD PRESSURE: 155 MMHG | DIASTOLIC BLOOD PRESSURE: 73 MMHG

## 2018-10-21 VITALS — SYSTOLIC BLOOD PRESSURE: 158 MMHG | DIASTOLIC BLOOD PRESSURE: 92 MMHG

## 2018-10-21 VITALS — SYSTOLIC BLOOD PRESSURE: 131 MMHG | DIASTOLIC BLOOD PRESSURE: 122 MMHG

## 2018-10-21 VITALS — SYSTOLIC BLOOD PRESSURE: 156 MMHG | DIASTOLIC BLOOD PRESSURE: 88 MMHG

## 2018-10-21 VITALS — SYSTOLIC BLOOD PRESSURE: 167 MMHG | DIASTOLIC BLOOD PRESSURE: 81 MMHG

## 2018-10-21 VITALS — DIASTOLIC BLOOD PRESSURE: 71 MMHG | SYSTOLIC BLOOD PRESSURE: 145 MMHG

## 2018-10-21 VITALS — DIASTOLIC BLOOD PRESSURE: 77 MMHG | SYSTOLIC BLOOD PRESSURE: 172 MMHG

## 2018-10-21 VITALS — DIASTOLIC BLOOD PRESSURE: 78 MMHG | SYSTOLIC BLOOD PRESSURE: 165 MMHG

## 2018-10-21 VITALS — SYSTOLIC BLOOD PRESSURE: 154 MMHG | DIASTOLIC BLOOD PRESSURE: 74 MMHG

## 2018-10-21 VITALS — SYSTOLIC BLOOD PRESSURE: 169 MMHG | DIASTOLIC BLOOD PRESSURE: 80 MMHG

## 2018-10-21 VITALS — SYSTOLIC BLOOD PRESSURE: 151 MMHG | DIASTOLIC BLOOD PRESSURE: 73 MMHG

## 2018-10-21 VITALS — DIASTOLIC BLOOD PRESSURE: 80 MMHG | SYSTOLIC BLOOD PRESSURE: 175 MMHG

## 2018-10-21 VITALS — SYSTOLIC BLOOD PRESSURE: 170 MMHG | DIASTOLIC BLOOD PRESSURE: 79 MMHG

## 2018-10-21 VITALS — DIASTOLIC BLOOD PRESSURE: 64 MMHG | SYSTOLIC BLOOD PRESSURE: 143 MMHG

## 2018-10-21 VITALS — SYSTOLIC BLOOD PRESSURE: 155 MMHG | DIASTOLIC BLOOD PRESSURE: 77 MMHG

## 2018-10-21 VITALS — SYSTOLIC BLOOD PRESSURE: 145 MMHG | DIASTOLIC BLOOD PRESSURE: 60 MMHG

## 2018-10-21 VITALS — SYSTOLIC BLOOD PRESSURE: 146 MMHG | DIASTOLIC BLOOD PRESSURE: 68 MMHG

## 2018-10-21 VITALS — DIASTOLIC BLOOD PRESSURE: 77 MMHG | SYSTOLIC BLOOD PRESSURE: 165 MMHG

## 2018-10-21 VITALS — SYSTOLIC BLOOD PRESSURE: 152 MMHG | DIASTOLIC BLOOD PRESSURE: 75 MMHG

## 2018-10-21 VITALS — SYSTOLIC BLOOD PRESSURE: 147 MMHG | DIASTOLIC BLOOD PRESSURE: 65 MMHG

## 2018-10-21 VITALS — SYSTOLIC BLOOD PRESSURE: 161 MMHG | DIASTOLIC BLOOD PRESSURE: 69 MMHG

## 2018-10-21 VITALS — DIASTOLIC BLOOD PRESSURE: 67 MMHG | SYSTOLIC BLOOD PRESSURE: 151 MMHG

## 2018-10-21 VITALS — DIASTOLIC BLOOD PRESSURE: 73 MMHG | SYSTOLIC BLOOD PRESSURE: 177 MMHG

## 2018-10-21 VITALS — SYSTOLIC BLOOD PRESSURE: 150 MMHG | DIASTOLIC BLOOD PRESSURE: 64 MMHG

## 2018-10-21 VITALS — SYSTOLIC BLOOD PRESSURE: 156 MMHG | DIASTOLIC BLOOD PRESSURE: 76 MMHG

## 2018-10-21 VITALS — SYSTOLIC BLOOD PRESSURE: 141 MMHG | DIASTOLIC BLOOD PRESSURE: 63 MMHG

## 2018-10-21 VITALS — DIASTOLIC BLOOD PRESSURE: 78 MMHG | SYSTOLIC BLOOD PRESSURE: 155 MMHG

## 2018-10-21 VITALS — SYSTOLIC BLOOD PRESSURE: 146 MMHG | DIASTOLIC BLOOD PRESSURE: 65 MMHG

## 2018-10-21 VITALS — SYSTOLIC BLOOD PRESSURE: 148 MMHG | DIASTOLIC BLOOD PRESSURE: 62 MMHG

## 2018-10-21 VITALS — SYSTOLIC BLOOD PRESSURE: 137 MMHG | DIASTOLIC BLOOD PRESSURE: 79 MMHG

## 2018-10-21 VITALS — DIASTOLIC BLOOD PRESSURE: 63 MMHG | SYSTOLIC BLOOD PRESSURE: 148 MMHG

## 2018-10-21 VITALS — SYSTOLIC BLOOD PRESSURE: 153 MMHG | DIASTOLIC BLOOD PRESSURE: 77 MMHG

## 2018-10-21 VITALS — SYSTOLIC BLOOD PRESSURE: 173 MMHG | DIASTOLIC BLOOD PRESSURE: 73 MMHG

## 2018-10-21 VITALS — DIASTOLIC BLOOD PRESSURE: 74 MMHG | SYSTOLIC BLOOD PRESSURE: 170 MMHG

## 2018-10-21 VITALS — DIASTOLIC BLOOD PRESSURE: 112 MMHG | SYSTOLIC BLOOD PRESSURE: 119 MMHG

## 2018-10-21 VITALS — SYSTOLIC BLOOD PRESSURE: 151 MMHG | DIASTOLIC BLOOD PRESSURE: 72 MMHG

## 2018-10-21 VITALS — SYSTOLIC BLOOD PRESSURE: 165 MMHG | DIASTOLIC BLOOD PRESSURE: 76 MMHG

## 2018-10-21 VITALS — DIASTOLIC BLOOD PRESSURE: 114 MMHG | SYSTOLIC BLOOD PRESSURE: 138 MMHG

## 2018-10-21 VITALS — SYSTOLIC BLOOD PRESSURE: 148 MMHG | DIASTOLIC BLOOD PRESSURE: 81 MMHG

## 2018-10-21 VITALS — DIASTOLIC BLOOD PRESSURE: 76 MMHG | SYSTOLIC BLOOD PRESSURE: 154 MMHG

## 2018-10-21 VITALS — DIASTOLIC BLOOD PRESSURE: 76 MMHG | SYSTOLIC BLOOD PRESSURE: 167 MMHG

## 2018-10-21 VITALS — DIASTOLIC BLOOD PRESSURE: 75 MMHG | SYSTOLIC BLOOD PRESSURE: 172 MMHG

## 2018-10-21 VITALS — DIASTOLIC BLOOD PRESSURE: 70 MMHG | SYSTOLIC BLOOD PRESSURE: 154 MMHG

## 2018-10-21 LAB
ALBUMIN SERPL-MCNC: 2.5 G/DL (ref 3.5–5)
ALBUMIN/GLOB SERPL: 1.2 {RATIO} (ref 0.8–2)
ALP SERPL-CCNC: 37 IU/L (ref 40–150)
ALT SERPL-CCNC: 14 IU/L (ref 0–55)
ANION GAP SERPL CALC-SCNC: 13.4 MMOL/L (ref 8–16)
BASOPHILS # BLD AUTO: 0 10*3/UL (ref 0–0.1)
BASOPHILS NFR BLD AUTO: 0.3 % (ref 0–1)
BUN SERPL-MCNC: 9 MG/DL (ref 7–26)
BUN/CREAT SERPL: 13 (ref 6–25)
CALCIUM SERPL-MCNC: 8.7 MG/DL (ref 8.4–10.2)
CHLORIDE SERPL-SCNC: 110 MMOL/L (ref 98–107)
CO2 SERPL-SCNC: 23 MMOL/L (ref 22–29)
DEPRECATED NEUTROPHILS # BLD AUTO: 8.9 10*3/UL (ref 2.1–6.9)
EGFRCR SERPLBLD CKD-EPI 2021: > 60 ML/MIN (ref 60–?)
EOSINOPHIL # BLD AUTO: 0.5 10*3/UL (ref 0–0.4)
EOSINOPHIL NFR BLD AUTO: 4.7 % (ref 0–6)
ERYTHROCYTE [DISTWIDTH] IN CORD BLOOD: 13.7 % (ref 11.7–14.4)
GLOBULIN PLAS-MCNC: 2.1 G/DL (ref 2.3–3.5)
GLUCOSE SERPLBLD-MCNC: 141 MG/DL (ref 74–118)
HCT VFR BLD AUTO: 30.4 % (ref 34.2–44.1)
HGB BLD-MCNC: 10.2 G/DL (ref 12–16)
LYMPHOCYTES # BLD: 1.1 10*3/UL (ref 1–3.2)
LYMPHOCYTES NFR BLD AUTO: 9.4 % (ref 18–39.1)
MCH RBC QN AUTO: 32.6 PG (ref 28–32)
MCHC RBC AUTO-ENTMCNC: 33.6 G/DL (ref 31–35)
MCV RBC AUTO: 97.1 FL (ref 81–99)
MONOCYTES # BLD AUTO: 1 10*3/UL (ref 0.2–0.8)
MONOCYTES NFR BLD AUTO: 8.2 % (ref 4.4–11.3)
NEUTS SEG NFR BLD AUTO: 76.9 % (ref 38.7–80)
PLATELET # BLD AUTO: 200 X10E3/UL (ref 140–360)
POTASSIUM SERPL-SCNC: 3.4 MMOL/L (ref 3.5–5.1)
RBC # BLD AUTO: 3.13 X10E6/UL (ref 3.6–5.1)
SODIUM SERPL-SCNC: 143 MMOL/L (ref 136–145)

## 2018-10-21 RX ADMIN — SODIUM CHLORIDE SCH ML: 900 IRRIGANT IRRIGATION at 02:39

## 2018-10-21 RX ADMIN — SODIUM CHLORIDE SCH ML: 900 IRRIGANT IRRIGATION at 10:20

## 2018-10-21 RX ADMIN — SODIUM CHLORIDE SCH ML: 900 IRRIGANT IRRIGATION at 14:15

## 2018-10-21 RX ADMIN — VANCOMYCIN HYDROCHLORIDE SCH MLS/HR: 1 INJECTION, SOLUTION INTRAVENOUS at 11:30

## 2018-10-21 RX ADMIN — DEXTROSE, SODIUM CHLORIDE, AND POTASSIUM CHLORIDE SCH MLS/HR: 5; .9; .15 INJECTION INTRAVENOUS at 06:29

## 2018-10-21 RX ADMIN — SODIUM CHLORIDE SCH MG: 900 INJECTION INTRAVENOUS at 22:17

## 2018-10-21 RX ADMIN — SODIUM CHLORIDE SCH ML: 900 IRRIGANT IRRIGATION at 18:15

## 2018-10-21 RX ADMIN — SODIUM CHLORIDE SCH ML: 900 IRRIGANT IRRIGATION at 06:28

## 2018-10-21 RX ADMIN — DEXTROSE, SODIUM CHLORIDE, AND POTASSIUM CHLORIDE SCH MLS/HR: 5; .9; .15 INJECTION INTRAVENOUS at 21:57

## 2018-10-21 RX ADMIN — SODIUM CHLORIDE SCH ML: 900 IRRIGANT IRRIGATION at 21:57

## 2018-10-21 RX ADMIN — SODIUM CHLORIDE SCH GM: 9 INJECTION, SOLUTION INTRAVENOUS at 22:17

## 2018-10-21 RX ADMIN — Medication SCH GM: at 09:42

## 2018-10-21 RX ADMIN — SODIUM CHLORIDE SCH GM: 9 INJECTION, SOLUTION INTRAVENOUS at 11:00

## 2018-10-21 RX ADMIN — VANCOMYCIN HYDROCHLORIDE SCH MLS/HR: 1 INJECTION, SOLUTION INTRAVENOUS at 22:18

## 2018-10-21 NOTE — PROGRESS NOTE
DATE:  October 21, 2018 



CARDIOLOGY PROGRESS NOTE



SUBJECTIVE:  No major events overnight.  Has some abdominal discomfort 

today, but otherwise no chest pain or shortness of breath.



OBJECTIVE

VITAL SIGNS:  Temperature afebrile, pulse 67 sinus, respiratory rate 16, 

blood pressure 161/93, satting 89% on nasal cannula.

GENERAL:  Elderly white female, thin, ill-appearing.

CARDIOVASCULAR:  Regular rate and rhythm.  A 3/6 systolic murmur right 

upper sternal border consistent with aortic stenosis radiating to the 

carotids.  Palpable carotid pulses.  Palpable radial pulses.  No lower 

extremity edema or ulcerations.

LUNGS:  Clear to auscultation bilaterally.

ABDOMEN:  Soft, mildly distended, nontender.  No masses.

NEURO AND PSYCH:  Alert and oriented to person, place, and time.  Normal 

affect.



LABORATORY DATA:  Reviewed.



IMAGING DATA:  Reviewed.



TELEMETRY DATA:  Reviewed, shows normal sinus rhythm.



INPATIENT MEDICATIONS:  Reviewed.



ASSESSMENT

1. Paroxysmal atrial fibrillation with rapid ventricular response.

2. History of aortic valve endocarditis in 2016, status post antibiotics 

and no prior surgery.

3. Hypertension.

4. History of deep venous thrombosis and pulmonary embolus, status post 

inferior vena cava filter.

5. History of chronic venous insufficiency and venous ulcerations, 

status post vein ablations in the past.

6. History of brain aneurysm, status post ligation.

7. Small bowel obstruction.

8. Abnormal EKG.



PLAN:  Continue IV amiodarone in the perioperative period, remains in sinus 

rhythm.  Little hypertensive today because not able to take her p.o. 

medicines.  Continue p.r.n. IV medicines for hypertension.  Echo pending, 

we will review.  She will be at moderate risk for perioperative 

cardiovascular events or abdominal surgery.  No further cardiovascular 

workup or intervention planned outside of the echocardiogram.  Currently 

euvolemic.  No unstable cardiovascular symptoms.



Thank you for this consult.  We will continue to follow.









DD:  10/21/2018 15:22

DT:  10/21/2018 15:31

Job#:  [id number] CHARLES

## 2018-10-22 VITALS — DIASTOLIC BLOOD PRESSURE: 85 MMHG | SYSTOLIC BLOOD PRESSURE: 164 MMHG

## 2018-10-22 VITALS — SYSTOLIC BLOOD PRESSURE: 156 MMHG | DIASTOLIC BLOOD PRESSURE: 77 MMHG

## 2018-10-22 VITALS — DIASTOLIC BLOOD PRESSURE: 74 MMHG | SYSTOLIC BLOOD PRESSURE: 166 MMHG

## 2018-10-22 VITALS — SYSTOLIC BLOOD PRESSURE: 118 MMHG | DIASTOLIC BLOOD PRESSURE: 87 MMHG

## 2018-10-22 VITALS — SYSTOLIC BLOOD PRESSURE: 127 MMHG | DIASTOLIC BLOOD PRESSURE: 64 MMHG

## 2018-10-22 VITALS — DIASTOLIC BLOOD PRESSURE: 89 MMHG | SYSTOLIC BLOOD PRESSURE: 162 MMHG

## 2018-10-22 VITALS — SYSTOLIC BLOOD PRESSURE: 138 MMHG | DIASTOLIC BLOOD PRESSURE: 66 MMHG

## 2018-10-22 VITALS — DIASTOLIC BLOOD PRESSURE: 63 MMHG | SYSTOLIC BLOOD PRESSURE: 125 MMHG

## 2018-10-22 VITALS — DIASTOLIC BLOOD PRESSURE: 96 MMHG | SYSTOLIC BLOOD PRESSURE: 169 MMHG

## 2018-10-22 VITALS — SYSTOLIC BLOOD PRESSURE: 168 MMHG | DIASTOLIC BLOOD PRESSURE: 82 MMHG

## 2018-10-22 VITALS — DIASTOLIC BLOOD PRESSURE: 73 MMHG | SYSTOLIC BLOOD PRESSURE: 165 MMHG

## 2018-10-22 VITALS — SYSTOLIC BLOOD PRESSURE: 153 MMHG | DIASTOLIC BLOOD PRESSURE: 72 MMHG

## 2018-10-22 VITALS — SYSTOLIC BLOOD PRESSURE: 150 MMHG | DIASTOLIC BLOOD PRESSURE: 77 MMHG

## 2018-10-22 VITALS — SYSTOLIC BLOOD PRESSURE: 158 MMHG | DIASTOLIC BLOOD PRESSURE: 83 MMHG

## 2018-10-22 VITALS — SYSTOLIC BLOOD PRESSURE: 140 MMHG | DIASTOLIC BLOOD PRESSURE: 79 MMHG

## 2018-10-22 VITALS — DIASTOLIC BLOOD PRESSURE: 77 MMHG | SYSTOLIC BLOOD PRESSURE: 137 MMHG

## 2018-10-22 VITALS — DIASTOLIC BLOOD PRESSURE: 71 MMHG | SYSTOLIC BLOOD PRESSURE: 132 MMHG

## 2018-10-22 VITALS — DIASTOLIC BLOOD PRESSURE: 71 MMHG | SYSTOLIC BLOOD PRESSURE: 151 MMHG

## 2018-10-22 VITALS — SYSTOLIC BLOOD PRESSURE: 151 MMHG | DIASTOLIC BLOOD PRESSURE: 71 MMHG

## 2018-10-22 VITALS — SYSTOLIC BLOOD PRESSURE: 121 MMHG | DIASTOLIC BLOOD PRESSURE: 93 MMHG

## 2018-10-22 VITALS — DIASTOLIC BLOOD PRESSURE: 73 MMHG | SYSTOLIC BLOOD PRESSURE: 157 MMHG

## 2018-10-22 VITALS — SYSTOLIC BLOOD PRESSURE: 173 MMHG | DIASTOLIC BLOOD PRESSURE: 85 MMHG

## 2018-10-22 VITALS — DIASTOLIC BLOOD PRESSURE: 82 MMHG | SYSTOLIC BLOOD PRESSURE: 173 MMHG

## 2018-10-22 VITALS — DIASTOLIC BLOOD PRESSURE: 96 MMHG | SYSTOLIC BLOOD PRESSURE: 162 MMHG

## 2018-10-22 VITALS — SYSTOLIC BLOOD PRESSURE: 165 MMHG | DIASTOLIC BLOOD PRESSURE: 82 MMHG

## 2018-10-22 VITALS — DIASTOLIC BLOOD PRESSURE: 69 MMHG | SYSTOLIC BLOOD PRESSURE: 170 MMHG

## 2018-10-22 VITALS — DIASTOLIC BLOOD PRESSURE: 81 MMHG | SYSTOLIC BLOOD PRESSURE: 156 MMHG

## 2018-10-22 VITALS — DIASTOLIC BLOOD PRESSURE: 73 MMHG | SYSTOLIC BLOOD PRESSURE: 169 MMHG

## 2018-10-22 VITALS — DIASTOLIC BLOOD PRESSURE: 75 MMHG | SYSTOLIC BLOOD PRESSURE: 157 MMHG

## 2018-10-22 VITALS — SYSTOLIC BLOOD PRESSURE: 159 MMHG | DIASTOLIC BLOOD PRESSURE: 75 MMHG

## 2018-10-22 VITALS — SYSTOLIC BLOOD PRESSURE: 166 MMHG | DIASTOLIC BLOOD PRESSURE: 79 MMHG

## 2018-10-22 VITALS — DIASTOLIC BLOOD PRESSURE: 78 MMHG | SYSTOLIC BLOOD PRESSURE: 160 MMHG

## 2018-10-22 VITALS — DIASTOLIC BLOOD PRESSURE: 76 MMHG | SYSTOLIC BLOOD PRESSURE: 171 MMHG

## 2018-10-22 VITALS — SYSTOLIC BLOOD PRESSURE: 165 MMHG | DIASTOLIC BLOOD PRESSURE: 94 MMHG

## 2018-10-22 VITALS — DIASTOLIC BLOOD PRESSURE: 80 MMHG | SYSTOLIC BLOOD PRESSURE: 175 MMHG

## 2018-10-22 VITALS — DIASTOLIC BLOOD PRESSURE: 81 MMHG | SYSTOLIC BLOOD PRESSURE: 166 MMHG

## 2018-10-22 VITALS — SYSTOLIC BLOOD PRESSURE: 122 MMHG | DIASTOLIC BLOOD PRESSURE: 62 MMHG

## 2018-10-22 VITALS — DIASTOLIC BLOOD PRESSURE: 73 MMHG | SYSTOLIC BLOOD PRESSURE: 152 MMHG

## 2018-10-22 VITALS — DIASTOLIC BLOOD PRESSURE: 77 MMHG | SYSTOLIC BLOOD PRESSURE: 160 MMHG

## 2018-10-22 VITALS — DIASTOLIC BLOOD PRESSURE: 77 MMHG | SYSTOLIC BLOOD PRESSURE: 159 MMHG

## 2018-10-22 VITALS — DIASTOLIC BLOOD PRESSURE: 74 MMHG | SYSTOLIC BLOOD PRESSURE: 153 MMHG

## 2018-10-22 VITALS — DIASTOLIC BLOOD PRESSURE: 79 MMHG | SYSTOLIC BLOOD PRESSURE: 154 MMHG

## 2018-10-22 VITALS — DIASTOLIC BLOOD PRESSURE: 77 MMHG | SYSTOLIC BLOOD PRESSURE: 165 MMHG

## 2018-10-22 VITALS — SYSTOLIC BLOOD PRESSURE: 166 MMHG | DIASTOLIC BLOOD PRESSURE: 87 MMHG

## 2018-10-22 VITALS — SYSTOLIC BLOOD PRESSURE: 160 MMHG | DIASTOLIC BLOOD PRESSURE: 83 MMHG

## 2018-10-22 VITALS — SYSTOLIC BLOOD PRESSURE: 171 MMHG | DIASTOLIC BLOOD PRESSURE: 84 MMHG

## 2018-10-22 VITALS — DIASTOLIC BLOOD PRESSURE: 74 MMHG | SYSTOLIC BLOOD PRESSURE: 159 MMHG

## 2018-10-22 VITALS — DIASTOLIC BLOOD PRESSURE: 98 MMHG | SYSTOLIC BLOOD PRESSURE: 151 MMHG

## 2018-10-22 VITALS — SYSTOLIC BLOOD PRESSURE: 148 MMHG | DIASTOLIC BLOOD PRESSURE: 88 MMHG

## 2018-10-22 VITALS — SYSTOLIC BLOOD PRESSURE: 134 MMHG | DIASTOLIC BLOOD PRESSURE: 66 MMHG

## 2018-10-22 VITALS — SYSTOLIC BLOOD PRESSURE: 161 MMHG | DIASTOLIC BLOOD PRESSURE: 68 MMHG

## 2018-10-22 VITALS — DIASTOLIC BLOOD PRESSURE: 75 MMHG | SYSTOLIC BLOOD PRESSURE: 164 MMHG

## 2018-10-22 VITALS — DIASTOLIC BLOOD PRESSURE: 83 MMHG | SYSTOLIC BLOOD PRESSURE: 148 MMHG

## 2018-10-22 VITALS — DIASTOLIC BLOOD PRESSURE: 68 MMHG | SYSTOLIC BLOOD PRESSURE: 142 MMHG

## 2018-10-22 VITALS — DIASTOLIC BLOOD PRESSURE: 127 MMHG | SYSTOLIC BLOOD PRESSURE: 132 MMHG

## 2018-10-22 VITALS — SYSTOLIC BLOOD PRESSURE: 149 MMHG | DIASTOLIC BLOOD PRESSURE: 74 MMHG

## 2018-10-22 VITALS — SYSTOLIC BLOOD PRESSURE: 162 MMHG | DIASTOLIC BLOOD PRESSURE: 74 MMHG

## 2018-10-22 VITALS — SYSTOLIC BLOOD PRESSURE: 161 MMHG | DIASTOLIC BLOOD PRESSURE: 83 MMHG

## 2018-10-22 VITALS — DIASTOLIC BLOOD PRESSURE: 72 MMHG | SYSTOLIC BLOOD PRESSURE: 149 MMHG

## 2018-10-22 VITALS — DIASTOLIC BLOOD PRESSURE: 72 MMHG | SYSTOLIC BLOOD PRESSURE: 154 MMHG

## 2018-10-22 VITALS — DIASTOLIC BLOOD PRESSURE: 67 MMHG | SYSTOLIC BLOOD PRESSURE: 138 MMHG

## 2018-10-22 VITALS — DIASTOLIC BLOOD PRESSURE: 78 MMHG | SYSTOLIC BLOOD PRESSURE: 151 MMHG

## 2018-10-22 VITALS — DIASTOLIC BLOOD PRESSURE: 64 MMHG | SYSTOLIC BLOOD PRESSURE: 110 MMHG

## 2018-10-22 VITALS — SYSTOLIC BLOOD PRESSURE: 146 MMHG | DIASTOLIC BLOOD PRESSURE: 68 MMHG

## 2018-10-22 VITALS — DIASTOLIC BLOOD PRESSURE: 63 MMHG | SYSTOLIC BLOOD PRESSURE: 129 MMHG

## 2018-10-22 VITALS — DIASTOLIC BLOOD PRESSURE: 71 MMHG | SYSTOLIC BLOOD PRESSURE: 157 MMHG

## 2018-10-22 VITALS — SYSTOLIC BLOOD PRESSURE: 139 MMHG | DIASTOLIC BLOOD PRESSURE: 80 MMHG

## 2018-10-22 VITALS — SYSTOLIC BLOOD PRESSURE: 142 MMHG | DIASTOLIC BLOOD PRESSURE: 81 MMHG

## 2018-10-22 VITALS — DIASTOLIC BLOOD PRESSURE: 85 MMHG | SYSTOLIC BLOOD PRESSURE: 168 MMHG

## 2018-10-22 VITALS — DIASTOLIC BLOOD PRESSURE: 76 MMHG | SYSTOLIC BLOOD PRESSURE: 157 MMHG

## 2018-10-22 VITALS — DIASTOLIC BLOOD PRESSURE: 89 MMHG | SYSTOLIC BLOOD PRESSURE: 166 MMHG

## 2018-10-22 VITALS — DIASTOLIC BLOOD PRESSURE: 74 MMHG | SYSTOLIC BLOOD PRESSURE: 151 MMHG

## 2018-10-22 VITALS — SYSTOLIC BLOOD PRESSURE: 167 MMHG | DIASTOLIC BLOOD PRESSURE: 70 MMHG

## 2018-10-22 VITALS — DIASTOLIC BLOOD PRESSURE: 83 MMHG | SYSTOLIC BLOOD PRESSURE: 159 MMHG

## 2018-10-22 VITALS — SYSTOLIC BLOOD PRESSURE: 165 MMHG | DIASTOLIC BLOOD PRESSURE: 73 MMHG

## 2018-10-22 VITALS — DIASTOLIC BLOOD PRESSURE: 88 MMHG | SYSTOLIC BLOOD PRESSURE: 171 MMHG

## 2018-10-22 VITALS — SYSTOLIC BLOOD PRESSURE: 170 MMHG | DIASTOLIC BLOOD PRESSURE: 92 MMHG

## 2018-10-22 VITALS — SYSTOLIC BLOOD PRESSURE: 159 MMHG | DIASTOLIC BLOOD PRESSURE: 79 MMHG

## 2018-10-22 VITALS — DIASTOLIC BLOOD PRESSURE: 75 MMHG | SYSTOLIC BLOOD PRESSURE: 162 MMHG

## 2018-10-22 VITALS — SYSTOLIC BLOOD PRESSURE: 169 MMHG | DIASTOLIC BLOOD PRESSURE: 75 MMHG

## 2018-10-22 VITALS — DIASTOLIC BLOOD PRESSURE: 64 MMHG | SYSTOLIC BLOOD PRESSURE: 119 MMHG

## 2018-10-22 VITALS — DIASTOLIC BLOOD PRESSURE: 75 MMHG | SYSTOLIC BLOOD PRESSURE: 171 MMHG

## 2018-10-22 LAB
ALBUMIN SERPL-MCNC: 2.5 G/DL (ref 3.5–5)
ALBUMIN/GLOB SERPL: 1.1 {RATIO} (ref 0.8–2)
ALP SERPL-CCNC: 42 IU/L (ref 40–150)
ALT SERPL-CCNC: 12 IU/L (ref 0–55)
ANION GAP SERPL CALC-SCNC: 12.7 MMOL/L (ref 8–16)
BASOPHILS # BLD AUTO: 0 10*3/UL (ref 0–0.1)
BASOPHILS NFR BLD AUTO: 0.4 % (ref 0–1)
BUN SERPL-MCNC: 5 MG/DL (ref 7–26)
BUN/CREAT SERPL: 7 (ref 6–25)
CALCIUM SERPL-MCNC: 8.9 MG/DL (ref 8.4–10.2)
CHLORIDE SERPL-SCNC: 109 MMOL/L (ref 98–107)
CO2 SERPL-SCNC: 22 MMOL/L (ref 22–29)
DEPRECATED NEUTROPHILS # BLD AUTO: 6.4 10*3/UL (ref 2.1–6.9)
EGFRCR SERPLBLD CKD-EPI 2021: > 60 ML/MIN (ref 60–?)
EOSINOPHIL # BLD AUTO: 0.5 10*3/UL (ref 0–0.4)
EOSINOPHIL NFR BLD AUTO: 5.5 % (ref 0–6)
ERYTHROCYTE [DISTWIDTH] IN CORD BLOOD: 13.7 % (ref 11.7–14.4)
GLOBULIN PLAS-MCNC: 2.3 G/DL (ref 2.3–3.5)
GLUCOSE SERPLBLD-MCNC: 153 MG/DL (ref 74–118)
HCT VFR BLD AUTO: 32.9 % (ref 34.2–44.1)
HGB BLD-MCNC: 10.9 G/DL (ref 12–16)
LYMPHOCYTES # BLD: 1.4 10*3/UL (ref 1–3.2)
LYMPHOCYTES NFR BLD AUTO: 14.8 % (ref 18–39.1)
MCH RBC QN AUTO: 32.2 PG (ref 28–32)
MCHC RBC AUTO-ENTMCNC: 33.1 G/DL (ref 31–35)
MCV RBC AUTO: 97.3 FL (ref 81–99)
MONOCYTES # BLD AUTO: 1 10*3/UL (ref 0.2–0.8)
MONOCYTES NFR BLD AUTO: 10.8 % (ref 4.4–11.3)
NEUTS SEG NFR BLD AUTO: 67.9 % (ref 38.7–80)
PLATELET # BLD AUTO: 230 X10E3/UL (ref 140–360)
POTASSIUM SERPL-SCNC: 3.7 MMOL/L (ref 3.5–5.1)
RBC # BLD AUTO: 3.38 X10E6/UL (ref 3.6–5.1)
SODIUM SERPL-SCNC: 140 MMOL/L (ref 136–145)

## 2018-10-22 RX ADMIN — VANCOMYCIN HYDROCHLORIDE SCH MLS/HR: 1 INJECTION, SOLUTION INTRAVENOUS at 10:31

## 2018-10-22 RX ADMIN — SODIUM CHLORIDE SCH ML: 900 IRRIGANT IRRIGATION at 15:07

## 2018-10-22 RX ADMIN — SODIUM CHLORIDE SCH ML: 900 IRRIGANT IRRIGATION at 06:15

## 2018-10-22 RX ADMIN — SODIUM CHLORIDE SCH ML: 900 IRRIGANT IRRIGATION at 01:11

## 2018-10-22 RX ADMIN — Medication SCH MLS/HR: at 15:08

## 2018-10-22 RX ADMIN — SODIUM CHLORIDE SCH ML: 900 IRRIGANT IRRIGATION at 18:30

## 2018-10-22 RX ADMIN — SODIUM CHLORIDE SCH GM: 9 INJECTION, SOLUTION INTRAVENOUS at 10:31

## 2018-10-22 RX ADMIN — SODIUM CHLORIDE SCH ML: 900 IRRIGANT IRRIGATION at 10:35

## 2018-10-22 RX ADMIN — DEXTROSE, SODIUM CHLORIDE, AND POTASSIUM CHLORIDE SCH MLS/HR: 5; .9; .15 INJECTION INTRAVENOUS at 10:33

## 2018-10-22 RX ADMIN — SODIUM CHLORIDE SCH ML: 900 IRRIGANT IRRIGATION at 22:17

## 2018-10-22 RX ADMIN — Medication SCH GM: at 10:04

## 2018-10-22 RX ADMIN — SODIUM CHLORIDE SCH GM: 9 INJECTION, SOLUTION INTRAVENOUS at 22:17

## 2018-10-22 RX ADMIN — SODIUM CHLORIDE SCH MG: 900 INJECTION INTRAVENOUS at 22:17

## 2018-10-22 NOTE — DIAGNOSTIC IMAGING REPORT
EXAMINATION:  CHEST SINGLE (PORTABLE)    



INDICATION:           Left lower lobe pneumonia 



COMPARISON:  10/20/2018

     

FINDINGS:

TUBES and LINES:  Right extremity PICC line and NG tube are again visualized

and stable position.



LUNGS:  Lungs are not well inflated.  There are bibasilar atelectasis.  

Confluent opacity in the left lower lobe compatible with additional atelectasis



PLEURA:  Trace of bilateral pleural effusions



HEART AND MEDIASTINUM:  The cardiomediastinal silhouette is unremarkable. There

are atherosclerotic calcifications within the aorta.



BONES AND SOFT TISSUES:  No acute osseous lesion.  Soft tissues are

unremarkable.



UPPER ABDOMEN: No free air under the diaphragm.    



IMPRESSION: 

1.  No evidence of pneumonia. However, opacities in the lung bases are

compatible with atelectasis left greater than right.

2.  Small bilateral pleural effusions





Signed by: Dr. Awais Pleayo M.D. on 10/22/2018 6:42 AM

## 2018-10-22 NOTE — PROGRESS NOTE
DATE:  October 22, 2018 



CARDIOLOGY PROGRESS NOTE 



SUBJECTIVE:  No major events overnight.  The patient is still n.p.o.  She 

has some abdominal discomfort with palpation of the area.  No chest pain or 

shortness of breath.  No palpitations. 



OBJECTIVE

VITALS:  Temperature is 97.9 degrees Fahrenheit.  Heart rate is 62.  

Respirations are 16.  Blood pressure is 132/98.  Oxygen saturation is 98% 

on 2 liters nasal cannula.  

GENERAL:  Elderly female lying comfortably in bed in no apparent distress. 

NECK:  No jugular venous distention.  Oropharynx is dry.  

EYES:  The extraocular muscles are intact.  

CARDIOVASCULAR:  Regular rate and rhythm.  Systolic murmur heard best at 

the right upper sternal border, 3/6.  Palpable pulses.  No lower extremity 

edema.

LUNGS:  Clear to auscultation bilaterally.  No wheezing or rales.

ABDOMEN:  Soft.  Mildly distended.  Mildly tender. 

SKIN:  Warm, dry, and intact.

NEUROLOGIC:  No focal deficits noted.  Cranial nerves are grossly intact.

PSYCHIATRIC:  Normal mood and affect.



LABS:  All laboratory and imaging data were reviewed. 



MEDICATIONS:  Reviewed. 



TELEMETRY MONITORING:  Normal sinus rhythm.  



ASSESSMENT 

1. Paroxysmal atrial fibrillation, resolved.  Currently normal sinus 

rhythm. 

2. History of aortic valve endocarditis in 2016, status post 

antibiotics.

3. Hypertension.

4. History of deep venous thrombosis and pulmonary embolism, status post 

superior vena caval filter placement.

5. Chronic venous insufficiency.

6. History of brain aneurysm, status post ligation.

7. Small-bowel obstruction.



RECOMMENDATIONS:  Will continue amiodarone intravenously as this has 

restored and is maintaining normal sinus rhythm.  She is not taking any 

p.o. medications and can use as-needed IV metoprolol if she becomes 

tachycardic.  Echocardiogram has been ordered, and we will review this once 

this has been resulted.  She is at moderate risk for perioperative 

cardiovascular events for her abdominal surgery.  Continue small-bowel 

obstruction treatment per primary and surgical teams.  



Thank you for the consultation.  We will continue to follow along with you.







DD:  10/22/2018 13:58

DT:  10/22/2018 14:06

Job#:  [id number]

## 2018-10-23 VITALS — SYSTOLIC BLOOD PRESSURE: 153 MMHG | DIASTOLIC BLOOD PRESSURE: 65 MMHG

## 2018-10-23 VITALS — SYSTOLIC BLOOD PRESSURE: 150 MMHG | DIASTOLIC BLOOD PRESSURE: 74 MMHG

## 2018-10-23 VITALS — SYSTOLIC BLOOD PRESSURE: 156 MMHG | DIASTOLIC BLOOD PRESSURE: 67 MMHG

## 2018-10-23 VITALS — DIASTOLIC BLOOD PRESSURE: 68 MMHG | SYSTOLIC BLOOD PRESSURE: 147 MMHG

## 2018-10-23 VITALS — SYSTOLIC BLOOD PRESSURE: 158 MMHG | DIASTOLIC BLOOD PRESSURE: 79 MMHG

## 2018-10-23 VITALS — DIASTOLIC BLOOD PRESSURE: 74 MMHG | SYSTOLIC BLOOD PRESSURE: 163 MMHG

## 2018-10-23 VITALS — SYSTOLIC BLOOD PRESSURE: 141 MMHG | DIASTOLIC BLOOD PRESSURE: 77 MMHG

## 2018-10-23 VITALS — DIASTOLIC BLOOD PRESSURE: 89 MMHG | SYSTOLIC BLOOD PRESSURE: 176 MMHG

## 2018-10-23 VITALS — SYSTOLIC BLOOD PRESSURE: 145 MMHG | DIASTOLIC BLOOD PRESSURE: 71 MMHG

## 2018-10-23 VITALS — SYSTOLIC BLOOD PRESSURE: 130 MMHG | DIASTOLIC BLOOD PRESSURE: 70 MMHG

## 2018-10-23 VITALS — DIASTOLIC BLOOD PRESSURE: 76 MMHG | SYSTOLIC BLOOD PRESSURE: 178 MMHG

## 2018-10-23 VITALS — SYSTOLIC BLOOD PRESSURE: 138 MMHG | DIASTOLIC BLOOD PRESSURE: 72 MMHG

## 2018-10-23 VITALS — SYSTOLIC BLOOD PRESSURE: 153 MMHG | DIASTOLIC BLOOD PRESSURE: 73 MMHG

## 2018-10-23 VITALS — DIASTOLIC BLOOD PRESSURE: 78 MMHG | SYSTOLIC BLOOD PRESSURE: 163 MMHG

## 2018-10-23 VITALS — SYSTOLIC BLOOD PRESSURE: 143 MMHG | DIASTOLIC BLOOD PRESSURE: 76 MMHG

## 2018-10-23 VITALS — SYSTOLIC BLOOD PRESSURE: 186 MMHG | DIASTOLIC BLOOD PRESSURE: 83 MMHG

## 2018-10-23 VITALS — DIASTOLIC BLOOD PRESSURE: 69 MMHG | SYSTOLIC BLOOD PRESSURE: 146 MMHG

## 2018-10-23 VITALS — DIASTOLIC BLOOD PRESSURE: 91 MMHG | SYSTOLIC BLOOD PRESSURE: 177 MMHG

## 2018-10-23 VITALS — SYSTOLIC BLOOD PRESSURE: 147 MMHG | DIASTOLIC BLOOD PRESSURE: 68 MMHG

## 2018-10-23 VITALS — DIASTOLIC BLOOD PRESSURE: 76 MMHG | SYSTOLIC BLOOD PRESSURE: 133 MMHG

## 2018-10-23 VITALS — DIASTOLIC BLOOD PRESSURE: 79 MMHG | SYSTOLIC BLOOD PRESSURE: 149 MMHG

## 2018-10-23 VITALS — DIASTOLIC BLOOD PRESSURE: 68 MMHG | SYSTOLIC BLOOD PRESSURE: 130 MMHG

## 2018-10-23 VITALS — SYSTOLIC BLOOD PRESSURE: 168 MMHG | DIASTOLIC BLOOD PRESSURE: 72 MMHG

## 2018-10-23 VITALS — DIASTOLIC BLOOD PRESSURE: 72 MMHG | SYSTOLIC BLOOD PRESSURE: 165 MMHG

## 2018-10-23 VITALS — DIASTOLIC BLOOD PRESSURE: 72 MMHG | SYSTOLIC BLOOD PRESSURE: 171 MMHG

## 2018-10-23 VITALS — DIASTOLIC BLOOD PRESSURE: 73 MMHG | SYSTOLIC BLOOD PRESSURE: 162 MMHG

## 2018-10-23 VITALS — DIASTOLIC BLOOD PRESSURE: 79 MMHG | SYSTOLIC BLOOD PRESSURE: 167 MMHG

## 2018-10-23 VITALS — SYSTOLIC BLOOD PRESSURE: 166 MMHG | DIASTOLIC BLOOD PRESSURE: 85 MMHG

## 2018-10-23 VITALS — SYSTOLIC BLOOD PRESSURE: 148 MMHG | DIASTOLIC BLOOD PRESSURE: 71 MMHG

## 2018-10-23 VITALS — DIASTOLIC BLOOD PRESSURE: 72 MMHG | SYSTOLIC BLOOD PRESSURE: 136 MMHG

## 2018-10-23 VITALS — DIASTOLIC BLOOD PRESSURE: 72 MMHG | SYSTOLIC BLOOD PRESSURE: 138 MMHG

## 2018-10-23 VITALS — DIASTOLIC BLOOD PRESSURE: 76 MMHG | SYSTOLIC BLOOD PRESSURE: 163 MMHG

## 2018-10-23 VITALS — SYSTOLIC BLOOD PRESSURE: 138 MMHG | DIASTOLIC BLOOD PRESSURE: 73 MMHG

## 2018-10-23 VITALS — DIASTOLIC BLOOD PRESSURE: 81 MMHG | SYSTOLIC BLOOD PRESSURE: 158 MMHG

## 2018-10-23 VITALS — DIASTOLIC BLOOD PRESSURE: 67 MMHG | SYSTOLIC BLOOD PRESSURE: 148 MMHG

## 2018-10-23 VITALS — SYSTOLIC BLOOD PRESSURE: 152 MMHG | DIASTOLIC BLOOD PRESSURE: 81 MMHG

## 2018-10-23 VITALS — DIASTOLIC BLOOD PRESSURE: 73 MMHG | SYSTOLIC BLOOD PRESSURE: 127 MMHG

## 2018-10-23 VITALS — SYSTOLIC BLOOD PRESSURE: 175 MMHG | DIASTOLIC BLOOD PRESSURE: 76 MMHG

## 2018-10-23 VITALS — DIASTOLIC BLOOD PRESSURE: 85 MMHG | SYSTOLIC BLOOD PRESSURE: 164 MMHG

## 2018-10-23 VITALS — SYSTOLIC BLOOD PRESSURE: 160 MMHG | DIASTOLIC BLOOD PRESSURE: 78 MMHG

## 2018-10-23 VITALS — SYSTOLIC BLOOD PRESSURE: 133 MMHG | DIASTOLIC BLOOD PRESSURE: 68 MMHG

## 2018-10-23 VITALS — DIASTOLIC BLOOD PRESSURE: 78 MMHG | SYSTOLIC BLOOD PRESSURE: 133 MMHG

## 2018-10-23 VITALS — DIASTOLIC BLOOD PRESSURE: 78 MMHG | SYSTOLIC BLOOD PRESSURE: 168 MMHG

## 2018-10-23 VITALS — SYSTOLIC BLOOD PRESSURE: 139 MMHG | DIASTOLIC BLOOD PRESSURE: 71 MMHG

## 2018-10-23 VITALS — SYSTOLIC BLOOD PRESSURE: 131 MMHG | DIASTOLIC BLOOD PRESSURE: 70 MMHG

## 2018-10-23 VITALS — DIASTOLIC BLOOD PRESSURE: 77 MMHG | SYSTOLIC BLOOD PRESSURE: 144 MMHG

## 2018-10-23 VITALS — SYSTOLIC BLOOD PRESSURE: 143 MMHG | DIASTOLIC BLOOD PRESSURE: 68 MMHG

## 2018-10-23 VITALS — SYSTOLIC BLOOD PRESSURE: 149 MMHG | DIASTOLIC BLOOD PRESSURE: 77 MMHG

## 2018-10-23 VITALS — SYSTOLIC BLOOD PRESSURE: 126 MMHG | DIASTOLIC BLOOD PRESSURE: 67 MMHG

## 2018-10-23 VITALS — DIASTOLIC BLOOD PRESSURE: 73 MMHG | SYSTOLIC BLOOD PRESSURE: 163 MMHG

## 2018-10-23 VITALS — DIASTOLIC BLOOD PRESSURE: 77 MMHG | SYSTOLIC BLOOD PRESSURE: 154 MMHG

## 2018-10-23 VITALS — DIASTOLIC BLOOD PRESSURE: 69 MMHG | SYSTOLIC BLOOD PRESSURE: 130 MMHG

## 2018-10-23 VITALS — SYSTOLIC BLOOD PRESSURE: 159 MMHG | DIASTOLIC BLOOD PRESSURE: 74 MMHG

## 2018-10-23 VITALS — SYSTOLIC BLOOD PRESSURE: 154 MMHG | DIASTOLIC BLOOD PRESSURE: 80 MMHG

## 2018-10-23 VITALS — DIASTOLIC BLOOD PRESSURE: 93 MMHG | SYSTOLIC BLOOD PRESSURE: 146 MMHG

## 2018-10-23 VITALS — SYSTOLIC BLOOD PRESSURE: 174 MMHG | DIASTOLIC BLOOD PRESSURE: 75 MMHG

## 2018-10-23 VITALS — DIASTOLIC BLOOD PRESSURE: 76 MMHG | SYSTOLIC BLOOD PRESSURE: 164 MMHG

## 2018-10-23 VITALS — SYSTOLIC BLOOD PRESSURE: 170 MMHG | DIASTOLIC BLOOD PRESSURE: 78 MMHG

## 2018-10-23 VITALS — DIASTOLIC BLOOD PRESSURE: 79 MMHG | SYSTOLIC BLOOD PRESSURE: 174 MMHG

## 2018-10-23 VITALS — SYSTOLIC BLOOD PRESSURE: 167 MMHG | DIASTOLIC BLOOD PRESSURE: 80 MMHG

## 2018-10-23 VITALS — DIASTOLIC BLOOD PRESSURE: 69 MMHG | SYSTOLIC BLOOD PRESSURE: 128 MMHG

## 2018-10-23 VITALS — DIASTOLIC BLOOD PRESSURE: 73 MMHG | SYSTOLIC BLOOD PRESSURE: 139 MMHG

## 2018-10-23 VITALS — SYSTOLIC BLOOD PRESSURE: 172 MMHG | DIASTOLIC BLOOD PRESSURE: 76 MMHG

## 2018-10-23 VITALS — DIASTOLIC BLOOD PRESSURE: 76 MMHG | SYSTOLIC BLOOD PRESSURE: 155 MMHG

## 2018-10-23 VITALS — DIASTOLIC BLOOD PRESSURE: 70 MMHG | SYSTOLIC BLOOD PRESSURE: 162 MMHG

## 2018-10-23 VITALS — SYSTOLIC BLOOD PRESSURE: 166 MMHG | DIASTOLIC BLOOD PRESSURE: 78 MMHG

## 2018-10-23 VITALS — DIASTOLIC BLOOD PRESSURE: 90 MMHG | SYSTOLIC BLOOD PRESSURE: 128 MMHG

## 2018-10-23 VITALS — DIASTOLIC BLOOD PRESSURE: 70 MMHG | SYSTOLIC BLOOD PRESSURE: 164 MMHG

## 2018-10-23 VITALS — DIASTOLIC BLOOD PRESSURE: 78 MMHG | SYSTOLIC BLOOD PRESSURE: 152 MMHG

## 2018-10-23 VITALS — SYSTOLIC BLOOD PRESSURE: 141 MMHG | DIASTOLIC BLOOD PRESSURE: 74 MMHG

## 2018-10-23 VITALS — SYSTOLIC BLOOD PRESSURE: 141 MMHG | DIASTOLIC BLOOD PRESSURE: 69 MMHG

## 2018-10-23 VITALS — SYSTOLIC BLOOD PRESSURE: 173 MMHG | DIASTOLIC BLOOD PRESSURE: 90 MMHG

## 2018-10-23 VITALS — SYSTOLIC BLOOD PRESSURE: 111 MMHG | DIASTOLIC BLOOD PRESSURE: 90 MMHG

## 2018-10-23 VITALS — SYSTOLIC BLOOD PRESSURE: 177 MMHG | DIASTOLIC BLOOD PRESSURE: 80 MMHG

## 2018-10-23 VITALS — SYSTOLIC BLOOD PRESSURE: 148 MMHG | DIASTOLIC BLOOD PRESSURE: 69 MMHG

## 2018-10-23 LAB
ALBUMIN SERPL-MCNC: 2.5 G/DL (ref 3.5–5)
ALBUMIN/GLOB SERPL: 1.1 {RATIO} (ref 0.8–2)
ALP SERPL-CCNC: 43 IU/L (ref 40–150)
ALT SERPL-CCNC: 13 IU/L (ref 0–55)
ANION GAP SERPL CALC-SCNC: 9.3 MMOL/L (ref 8–16)
BASOPHILS # BLD AUTO: 0 10*3/UL (ref 0–0.1)
BASOPHILS NFR BLD AUTO: 0.4 % (ref 0–1)
BUN SERPL-MCNC: < 5 MG/DL (ref 7–26)
BUN/CREAT SERPL: 7 (ref 6–25)
CALCIUM SERPL-MCNC: 9.1 MG/DL (ref 8.4–10.2)
CHLORIDE SERPL-SCNC: 107 MMOL/L (ref 98–107)
CO2 SERPL-SCNC: 26 MMOL/L (ref 22–29)
DEPRECATED NEUTROPHILS # BLD AUTO: 4.9 10*3/UL (ref 2.1–6.9)
EGFRCR SERPLBLD CKD-EPI 2021: > 60 ML/MIN (ref 60–?)
EOSINOPHIL # BLD AUTO: 0.5 10*3/UL (ref 0–0.4)
EOSINOPHIL NFR BLD AUTO: 6.6 % (ref 0–6)
ERYTHROCYTE [DISTWIDTH] IN CORD BLOOD: 13.6 % (ref 11.7–14.4)
GLOBULIN PLAS-MCNC: 2.3 G/DL (ref 2.3–3.5)
GLUCOSE SERPLBLD-MCNC: 109 MG/DL (ref 74–118)
HCT VFR BLD AUTO: 32.4 % (ref 34.2–44.1)
HGB BLD-MCNC: 10.7 G/DL (ref 12–16)
LYMPHOCYTES # BLD: 1 10*3/UL (ref 1–3.2)
LYMPHOCYTES NFR BLD AUTO: 13.6 % (ref 18–39.1)
MCH RBC QN AUTO: 32 PG (ref 28–32)
MCHC RBC AUTO-ENTMCNC: 33 G/DL (ref 31–35)
MCV RBC AUTO: 97 FL (ref 81–99)
MONOCYTES # BLD AUTO: 1.1 10*3/UL (ref 0.2–0.8)
MONOCYTES NFR BLD AUTO: 14.1 % (ref 4.4–11.3)
NEUTS SEG NFR BLD AUTO: 64.4 % (ref 38.7–80)
PLATELET # BLD AUTO: 250 X10E3/UL (ref 140–360)
POTASSIUM SERPL-SCNC: 3.3 MMOL/L (ref 3.5–5.1)
RBC # BLD AUTO: 3.34 X10E6/UL (ref 3.6–5.1)
SODIUM SERPL-SCNC: 139 MMOL/L (ref 136–145)

## 2018-10-23 PROCEDURE — 0DN80ZZ RELEASE SMALL INTESTINE, OPEN APPROACH: ICD-10-PCS | Performed by: SURGERY

## 2018-10-23 PROCEDURE — 0DNE0ZZ RELEASE LARGE INTESTINE, OPEN APPROACH: ICD-10-PCS | Performed by: SURGERY

## 2018-10-23 RX ADMIN — VANCOMYCIN HYDROCHLORIDE SCH MLS/HR: 1 INJECTION, SOLUTION INTRAVENOUS at 11:21

## 2018-10-23 RX ADMIN — SODIUM CHLORIDE SCH ML: 900 IRRIGANT IRRIGATION at 02:43

## 2018-10-23 RX ADMIN — VANCOMYCIN HYDROCHLORIDE SCH MLS/HR: 1 INJECTION, SOLUTION INTRAVENOUS at 00:16

## 2018-10-23 RX ADMIN — SODIUM CHLORIDE SCH MG: 900 INJECTION INTRAVENOUS at 23:09

## 2018-10-23 RX ADMIN — SODIUM CHLORIDE SCH ML: 900 IRRIGANT IRRIGATION at 21:10

## 2018-10-23 RX ADMIN — SODIUM CHLORIDE SCH ML: 900 IRRIGANT IRRIGATION at 05:33

## 2018-10-23 RX ADMIN — DEXTROSE, SODIUM CHLORIDE, AND POTASSIUM CHLORIDE SCH MLS/HR: 5; .9; .15 INJECTION INTRAVENOUS at 06:30

## 2018-10-23 RX ADMIN — SODIUM CHLORIDE SCH ML: 900 IRRIGANT IRRIGATION at 13:04

## 2018-10-23 RX ADMIN — SODIUM CHLORIDE SCH ML: 900 IRRIGANT IRRIGATION at 14:00

## 2018-10-23 RX ADMIN — Medication SCH MLS/HR: at 02:43

## 2018-10-23 RX ADMIN — SODIUM CHLORIDE SCH GM: 9 INJECTION, SOLUTION INTRAVENOUS at 10:20

## 2018-10-23 RX ADMIN — SODIUM CHLORIDE SCH GM: 9 INJECTION, SOLUTION INTRAVENOUS at 23:09

## 2018-10-23 RX ADMIN — SODIUM CHLORIDE SCH ML: 900 IRRIGANT IRRIGATION at 17:38

## 2018-10-23 RX ADMIN — Medication SCH GM: at 08:45

## 2018-10-23 RX ADMIN — SODIUM CHLORIDE SCH ML: 900 IRRIGANT IRRIGATION at 07:40

## 2018-10-23 NOTE — OPERATIVE REPORT
DATE OF PROCEDURE:  October 23, 2018 



PREOPERATIVE DIAGNOSIS:  Small-bowel obstruction. 



POSTOPERATIVE DIAGNOSIS:  Small-bowel closed-loop obstruction secondary to 

adhesions.



OPERATION PERFORMED:  Exploratory laparotomy.  Extensive enterolysis and 

release of closed-loop small-bowel obstruction.  



ASSISTANT:  uSdhakar Acevedo MD



ANESTHESIA:  General endotracheal.



COMPLICATIONS:  None.



ESTIMATED BLOOD LOSS:  Minimal.



DESCRIPTION OF PROCEDURE:  With the patient lying in bed in the supine 

position, under good general endotracheal anesthesia, the abdomen was 

prepped with Betadine solution and draped in the usual manner.  A midline 

incision was made.  It was carried down through the subcutaneous tissue and 

through the midline fascia.  The peritoneum was opened.  The abdomen was 

entered.  Upon entering the abdominal cavity, there was a lot of adhesions 

to the anterior abdominal wall, which were slowly and carefully taken down. 

 We were able to eventually get into the free abdominal cavity.  Once this 

was done, exploration revealed a lot of adhesions of the colon all the way 

around, and these were taken down.  Once we were able to do this, we 

actually were able to get to the small bowel.  Just distal to the ligament 

of Treitz, there was a loop of bowel that had gone through an adhesion of 

other loops of small bowel creating a closed-loop obstruction with the 

bowel being obstructed at both ends.  The distal bowel was totally 

decompressed.  The proximal bowel was significantly dilated.  The adhesions 

to the retroperitoneum of the small bowel were then slowly and carefully 

taken down, and the closed-loop obstruction was released.  After this was 

done, a complete enterolysis was then performed of the rest of the bowel, 

making sure that all the small bowel was totally freed up all the way to 

the terminal ileum and the ileocecal valve.  The colon was similarly freed 

up in order to allow for proper emptying of the colon.  The colon was full 

of stool.  After all of this was done, the whole abdomen was then copiously 

irrigated.  Perfect hemostasis was ascertained.  All of the excess fluid 

was aspirated.  The abdomen was then closed in layers.  The peritoneum and 

fascia were closed with a running suture of #1 Vicryl.  The subcutaneous 

tissue was approximated with 3-0 chromic, and the skin was closed with 

clips.  A dressing was applied.  The sponge, lap and needle count was 

correct.  The patient tolerated the procedure well and returned to the 

recovery room in stable condition.  







DD:  10/23/2018 14:03

DT:  10/23/2018 15:25

Job#:  [id number]

## 2018-10-23 NOTE — PROGRESS NOTE
DATE:  October 23, 2018 



CARDIOLOGY PROGRESS NOTE 



SUBJECTIVE:  The patient denies chest pain or shortness of breath.  She is 

about to go for exploratory laparotomy with possible small-bowel resection. 

 



OBJECTIVE

VITAL SIGNS:  Temperature 98.6, heart rate 63, respiratory rate 18, blood 

pressure 162/73, oxygen saturation 98% on 4 liters nasal cannula. 

GENERAL:  Elderly woman in no acute distress.  Awake and alert.

LUNGS:  Clear to auscultation bilaterally.  No wheezes or crackles.

CARDIOVASCULAR:  Normal rate, regular rhythm.  Systolic murmur 3/6, best 

appreciated at the upper sternal border. 

ABDOMEN:  Soft.  Nontender. 

EXTREMITIES:  No edema.



CARDIAC MEDICATIONS 

1. Amiodarone 0.5 mg an hour.

2. Metoprolol tartrate 2.5 mg IV q.6 h. p.r.n. 



LABS:  WBC 7.59, hemoglobin 10.7, hematocrit 32.4, platelets 250.  Sodium 

139, potassium 3.3, chloride 107, CO2 26, BUN less than 5, creatinine 0.68.



TELEMETRY:  Normal sinus rhythm. 



IMPRESSION 

1. Paroxysmal atrial fibrillation, resolved.

2. History of aortic valve endocarditis, treated conservatively with 

antibiotics.

3. Hypertension.

4. History of deep venous thrombosis/pulmonary embolism, status post 

inferior vena cava filter.

5. Chronic venous insufficiency with history of brain aneurysm, status 

post ligation.



RECOMMENDATIONS:  Continue IV amiodarone to maintain sinus rhythm given 

plan for surgery.  As the patient has no p.o. access, p.r.n. IV metoprolol 

as necessary.  Once the patient is cleared for p.o. intake, will resume her 

home medications.  Add p.o. metoprolol and amiodarone at that time.  



Thank you for this consult.  We will continue to follow. 









DD:  10/23/2018 17:46

DT:  10/23/2018 17:48

Job#:  [id number]

## 2018-10-24 VITALS — SYSTOLIC BLOOD PRESSURE: 132 MMHG | DIASTOLIC BLOOD PRESSURE: 67 MMHG

## 2018-10-24 VITALS — SYSTOLIC BLOOD PRESSURE: 169 MMHG | DIASTOLIC BLOOD PRESSURE: 71 MMHG

## 2018-10-24 VITALS — SYSTOLIC BLOOD PRESSURE: 131 MMHG | DIASTOLIC BLOOD PRESSURE: 72 MMHG

## 2018-10-24 VITALS — SYSTOLIC BLOOD PRESSURE: 147 MMHG | DIASTOLIC BLOOD PRESSURE: 65 MMHG

## 2018-10-24 VITALS — SYSTOLIC BLOOD PRESSURE: 139 MMHG | DIASTOLIC BLOOD PRESSURE: 66 MMHG

## 2018-10-24 VITALS — SYSTOLIC BLOOD PRESSURE: 155 MMHG | DIASTOLIC BLOOD PRESSURE: 71 MMHG

## 2018-10-24 VITALS — SYSTOLIC BLOOD PRESSURE: 138 MMHG | DIASTOLIC BLOOD PRESSURE: 76 MMHG

## 2018-10-24 VITALS — DIASTOLIC BLOOD PRESSURE: 62 MMHG | SYSTOLIC BLOOD PRESSURE: 144 MMHG

## 2018-10-24 VITALS — SYSTOLIC BLOOD PRESSURE: 127 MMHG | DIASTOLIC BLOOD PRESSURE: 68 MMHG

## 2018-10-24 VITALS — SYSTOLIC BLOOD PRESSURE: 127 MMHG | DIASTOLIC BLOOD PRESSURE: 59 MMHG

## 2018-10-24 VITALS — DIASTOLIC BLOOD PRESSURE: 59 MMHG | SYSTOLIC BLOOD PRESSURE: 130 MMHG

## 2018-10-24 VITALS — SYSTOLIC BLOOD PRESSURE: 106 MMHG | DIASTOLIC BLOOD PRESSURE: 61 MMHG

## 2018-10-24 VITALS — DIASTOLIC BLOOD PRESSURE: 91 MMHG | SYSTOLIC BLOOD PRESSURE: 142 MMHG

## 2018-10-24 VITALS — SYSTOLIC BLOOD PRESSURE: 138 MMHG | DIASTOLIC BLOOD PRESSURE: 62 MMHG

## 2018-10-24 VITALS — SYSTOLIC BLOOD PRESSURE: 99 MMHG | DIASTOLIC BLOOD PRESSURE: 66 MMHG

## 2018-10-24 VITALS — DIASTOLIC BLOOD PRESSURE: 61 MMHG | SYSTOLIC BLOOD PRESSURE: 115 MMHG

## 2018-10-24 VITALS — SYSTOLIC BLOOD PRESSURE: 128 MMHG | DIASTOLIC BLOOD PRESSURE: 58 MMHG

## 2018-10-24 VITALS — SYSTOLIC BLOOD PRESSURE: 131 MMHG | DIASTOLIC BLOOD PRESSURE: 74 MMHG

## 2018-10-24 VITALS — DIASTOLIC BLOOD PRESSURE: 74 MMHG | SYSTOLIC BLOOD PRESSURE: 129 MMHG

## 2018-10-24 VITALS — DIASTOLIC BLOOD PRESSURE: 72 MMHG | SYSTOLIC BLOOD PRESSURE: 135 MMHG

## 2018-10-24 VITALS — SYSTOLIC BLOOD PRESSURE: 167 MMHG | DIASTOLIC BLOOD PRESSURE: 67 MMHG

## 2018-10-24 VITALS — DIASTOLIC BLOOD PRESSURE: 61 MMHG | SYSTOLIC BLOOD PRESSURE: 133 MMHG

## 2018-10-24 VITALS — SYSTOLIC BLOOD PRESSURE: 126 MMHG | DIASTOLIC BLOOD PRESSURE: 59 MMHG

## 2018-10-24 VITALS — SYSTOLIC BLOOD PRESSURE: 144 MMHG | DIASTOLIC BLOOD PRESSURE: 74 MMHG

## 2018-10-24 VITALS — DIASTOLIC BLOOD PRESSURE: 66 MMHG | SYSTOLIC BLOOD PRESSURE: 120 MMHG

## 2018-10-24 VITALS — SYSTOLIC BLOOD PRESSURE: 154 MMHG | DIASTOLIC BLOOD PRESSURE: 70 MMHG

## 2018-10-24 VITALS — SYSTOLIC BLOOD PRESSURE: 142 MMHG | DIASTOLIC BLOOD PRESSURE: 64 MMHG

## 2018-10-24 VITALS — DIASTOLIC BLOOD PRESSURE: 66 MMHG | SYSTOLIC BLOOD PRESSURE: 162 MMHG

## 2018-10-24 VITALS — DIASTOLIC BLOOD PRESSURE: 60 MMHG | SYSTOLIC BLOOD PRESSURE: 98 MMHG

## 2018-10-24 VITALS — SYSTOLIC BLOOD PRESSURE: 131 MMHG | DIASTOLIC BLOOD PRESSURE: 61 MMHG

## 2018-10-24 VITALS — SYSTOLIC BLOOD PRESSURE: 141 MMHG | DIASTOLIC BLOOD PRESSURE: 69 MMHG

## 2018-10-24 VITALS — SYSTOLIC BLOOD PRESSURE: 155 MMHG | DIASTOLIC BLOOD PRESSURE: 75 MMHG

## 2018-10-24 VITALS — SYSTOLIC BLOOD PRESSURE: 137 MMHG | DIASTOLIC BLOOD PRESSURE: 66 MMHG

## 2018-10-24 VITALS — DIASTOLIC BLOOD PRESSURE: 69 MMHG | SYSTOLIC BLOOD PRESSURE: 135 MMHG

## 2018-10-24 VITALS — DIASTOLIC BLOOD PRESSURE: 58 MMHG | SYSTOLIC BLOOD PRESSURE: 142 MMHG

## 2018-10-24 VITALS — DIASTOLIC BLOOD PRESSURE: 80 MMHG | SYSTOLIC BLOOD PRESSURE: 145 MMHG

## 2018-10-24 VITALS — SYSTOLIC BLOOD PRESSURE: 108 MMHG | DIASTOLIC BLOOD PRESSURE: 62 MMHG

## 2018-10-24 VITALS — DIASTOLIC BLOOD PRESSURE: 67 MMHG | SYSTOLIC BLOOD PRESSURE: 167 MMHG

## 2018-10-24 VITALS — DIASTOLIC BLOOD PRESSURE: 77 MMHG | SYSTOLIC BLOOD PRESSURE: 168 MMHG

## 2018-10-24 VITALS — SYSTOLIC BLOOD PRESSURE: 132 MMHG | DIASTOLIC BLOOD PRESSURE: 60 MMHG

## 2018-10-24 VITALS — SYSTOLIC BLOOD PRESSURE: 136 MMHG | DIASTOLIC BLOOD PRESSURE: 68 MMHG

## 2018-10-24 VITALS — SYSTOLIC BLOOD PRESSURE: 139 MMHG | DIASTOLIC BLOOD PRESSURE: 74 MMHG

## 2018-10-24 VITALS — SYSTOLIC BLOOD PRESSURE: 131 MMHG | DIASTOLIC BLOOD PRESSURE: 59 MMHG

## 2018-10-24 VITALS — DIASTOLIC BLOOD PRESSURE: 75 MMHG | SYSTOLIC BLOOD PRESSURE: 146 MMHG

## 2018-10-24 VITALS — SYSTOLIC BLOOD PRESSURE: 132 MMHG | DIASTOLIC BLOOD PRESSURE: 64 MMHG

## 2018-10-24 VITALS — DIASTOLIC BLOOD PRESSURE: 73 MMHG | SYSTOLIC BLOOD PRESSURE: 141 MMHG

## 2018-10-24 VITALS — SYSTOLIC BLOOD PRESSURE: 147 MMHG | DIASTOLIC BLOOD PRESSURE: 62 MMHG

## 2018-10-24 VITALS — SYSTOLIC BLOOD PRESSURE: 143 MMHG | DIASTOLIC BLOOD PRESSURE: 67 MMHG

## 2018-10-24 VITALS — SYSTOLIC BLOOD PRESSURE: 109 MMHG | DIASTOLIC BLOOD PRESSURE: 64 MMHG

## 2018-10-24 VITALS — SYSTOLIC BLOOD PRESSURE: 104 MMHG | DIASTOLIC BLOOD PRESSURE: 58 MMHG

## 2018-10-24 VITALS — SYSTOLIC BLOOD PRESSURE: 148 MMHG | DIASTOLIC BLOOD PRESSURE: 65 MMHG

## 2018-10-24 VITALS — SYSTOLIC BLOOD PRESSURE: 108 MMHG | DIASTOLIC BLOOD PRESSURE: 61 MMHG

## 2018-10-24 VITALS — DIASTOLIC BLOOD PRESSURE: 57 MMHG | SYSTOLIC BLOOD PRESSURE: 101 MMHG

## 2018-10-24 VITALS — SYSTOLIC BLOOD PRESSURE: 145 MMHG | DIASTOLIC BLOOD PRESSURE: 62 MMHG

## 2018-10-24 VITALS — DIASTOLIC BLOOD PRESSURE: 78 MMHG | SYSTOLIC BLOOD PRESSURE: 149 MMHG

## 2018-10-24 VITALS — SYSTOLIC BLOOD PRESSURE: 128 MMHG | DIASTOLIC BLOOD PRESSURE: 67 MMHG

## 2018-10-24 VITALS — SYSTOLIC BLOOD PRESSURE: 163 MMHG | DIASTOLIC BLOOD PRESSURE: 66 MMHG

## 2018-10-24 VITALS — SYSTOLIC BLOOD PRESSURE: 100 MMHG | DIASTOLIC BLOOD PRESSURE: 59 MMHG

## 2018-10-24 VITALS — DIASTOLIC BLOOD PRESSURE: 72 MMHG | SYSTOLIC BLOOD PRESSURE: 161 MMHG

## 2018-10-24 VITALS — DIASTOLIC BLOOD PRESSURE: 99 MMHG | SYSTOLIC BLOOD PRESSURE: 128 MMHG

## 2018-10-24 LAB
ALBUMIN SERPL-MCNC: 2.2 G/DL (ref 3.5–5)
ALBUMIN/GLOB SERPL: 0.9 {RATIO} (ref 0.8–2)
ALP SERPL-CCNC: 40 IU/L (ref 40–150)
ALT SERPL-CCNC: 12 IU/L (ref 0–55)
ANION GAP SERPL CALC-SCNC: 10.3 MMOL/L (ref 8–16)
BASOPHILS # BLD AUTO: 0 10*3/UL (ref 0–0.1)
BASOPHILS NFR BLD AUTO: 0.1 % (ref 0–1)
BUN SERPL-MCNC: 11 MG/DL (ref 7–26)
BUN/CREAT SERPL: 15 (ref 6–25)
CALCIUM SERPL-MCNC: 9 MG/DL (ref 8.4–10.2)
CHLORIDE SERPL-SCNC: 106 MMOL/L (ref 98–107)
CO2 SERPL-SCNC: 23 MMOL/L (ref 22–29)
DEPRECATED NEUTROPHILS # BLD AUTO: 17.9 10*3/UL (ref 2.1–6.9)
EGFRCR SERPLBLD CKD-EPI 2021: > 60 ML/MIN (ref 60–?)
EOSINOPHIL # BLD AUTO: 0 10*3/UL (ref 0–0.4)
EOSINOPHIL NFR BLD AUTO: 0 % (ref 0–6)
ERYTHROCYTE [DISTWIDTH] IN CORD BLOOD: 13.6 % (ref 11.7–14.4)
GLOBULIN PLAS-MCNC: 2.4 G/DL (ref 2.3–3.5)
GLUCOSE SERPLBLD-MCNC: 273 MG/DL (ref 74–118)
HCT VFR BLD AUTO: 33.5 % (ref 34.2–44.1)
HGB BLD-MCNC: 11.1 G/DL (ref 12–16)
LYMPHOCYTES # BLD: 0.8 10*3/UL (ref 1–3.2)
LYMPHOCYTES NFR BLD AUTO: 3.7 % (ref 18–39.1)
LYMPHOCYTES NFR BLD MANUAL: 3 % (ref 19–48)
MACROCYTES BLD QL SMEAR: SLIGHT
MCH RBC QN AUTO: 32.5 PG (ref 28–32)
MCHC RBC AUTO-ENTMCNC: 33.1 G/DL (ref 31–35)
MCV RBC AUTO: 98 FL (ref 81–99)
MONOCYTES # BLD AUTO: 2.2 10*3/UL (ref 0.2–0.8)
MONOCYTES NFR BLD AUTO: 10.5 % (ref 4.4–11.3)
MONOCYTES NFR BLD MANUAL: 6 % (ref 3.4–9)
NEUTS SEG NFR BLD AUTO: 84.9 % (ref 38.7–80)
NEUTS SEG NFR BLD MANUAL: 90 % (ref 40–74)
PLAT MORPH BLD: NORMAL
PLATELET # BLD AUTO: 319 X10E3/UL (ref 140–360)
PLATELET # BLD EST: ADEQUATE 10*3/UL
POTASSIUM SERPL-SCNC: 4.3 MMOL/L (ref 3.5–5.1)
RBC # BLD AUTO: 3.42 X10E6/UL (ref 3.6–5.1)
RBC MORPH BLD: NORMAL
SODIUM SERPL-SCNC: 135 MMOL/L (ref 136–145)

## 2018-10-24 RX ADMIN — SODIUM CHLORIDE SCH MLS/HR: 9 INJECTION, SOLUTION INTRAVENOUS at 19:45

## 2018-10-24 RX ADMIN — SODIUM CHLORIDE SCH ML: 900 IRRIGANT IRRIGATION at 05:15

## 2018-10-24 RX ADMIN — VANCOMYCIN HYDROCHLORIDE SCH MLS/HR: 1 INJECTION, SOLUTION INTRAVENOUS at 00:17

## 2018-10-24 RX ADMIN — SODIUM CHLORIDE SCH ML: 900 IRRIGANT IRRIGATION at 21:11

## 2018-10-24 RX ADMIN — SODIUM CHLORIDE SCH ML: 900 IRRIGANT IRRIGATION at 10:00

## 2018-10-24 RX ADMIN — SODIUM CHLORIDE SCH MG: 900 INJECTION INTRAVENOUS at 21:11

## 2018-10-24 RX ADMIN — BISACODYL SCH MG: 10 SUPPOSITORY RECTAL at 21:11

## 2018-10-24 RX ADMIN — Medication SCH GM: at 09:44

## 2018-10-24 RX ADMIN — HYDROMORPHONE HYDROCHLORIDE PRN MG: 2 INJECTION INTRAMUSCULAR; INTRAVENOUS; SUBCUTANEOUS at 12:19

## 2018-10-24 RX ADMIN — HYDROMORPHONE HYDROCHLORIDE PRN MG: 2 INJECTION INTRAMUSCULAR; INTRAVENOUS; SUBCUTANEOUS at 08:41

## 2018-10-24 RX ADMIN — HYDROMORPHONE HYDROCHLORIDE PRN MG: 2 INJECTION INTRAMUSCULAR; INTRAVENOUS; SUBCUTANEOUS at 02:04

## 2018-10-24 RX ADMIN — Medication SCH MLS/HR: at 18:52

## 2018-10-24 RX ADMIN — HYDROMORPHONE HYDROCHLORIDE PRN MG: 2 INJECTION INTRAMUSCULAR; INTRAVENOUS; SUBCUTANEOUS at 16:55

## 2018-10-24 RX ADMIN — HYDROMORPHONE HYDROCHLORIDE PRN MG: 2 INJECTION INTRAMUSCULAR; INTRAVENOUS; SUBCUTANEOUS at 21:05

## 2018-10-24 RX ADMIN — SODIUM CHLORIDE SCH ML: 900 IRRIGANT IRRIGATION at 01:59

## 2018-10-24 RX ADMIN — SODIUM CHLORIDE SCH GM: 9 INJECTION, SOLUTION INTRAVENOUS at 10:30

## 2018-10-24 RX ADMIN — SODIUM CHLORIDE SCH ML: 900 IRRIGANT IRRIGATION at 18:52

## 2018-10-24 RX ADMIN — VANCOMYCIN HYDROCHLORIDE SCH MLS/HR: 1 INJECTION, SOLUTION INTRAVENOUS at 22:28

## 2018-10-24 RX ADMIN — SODIUM CHLORIDE SCH ML: 900 IRRIGANT IRRIGATION at 14:53

## 2018-10-24 RX ADMIN — VANCOMYCIN HYDROCHLORIDE SCH MLS/HR: 1 INJECTION, SOLUTION INTRAVENOUS at 10:25

## 2018-10-24 RX ADMIN — DEXTROSE, SODIUM CHLORIDE, AND POTASSIUM CHLORIDE SCH MLS/HR: 5; .9; .15 INJECTION INTRAVENOUS at 09:43

## 2018-10-24 RX ADMIN — SODIUM CHLORIDE SCH GM: 9 INJECTION, SOLUTION INTRAVENOUS at 22:28

## 2018-10-24 NOTE — PROGRESS NOTE
DATE:  October 24, 2018 



NEPHROLOGY PROGRESS NOTE



SUBJECTIVE:  Her abdominal surgery yesterday without any significant 

complaints.  Reports having some abdominal pain and not feeling good after 

surgery.  Denies any chest pain or shortness of breath.



OBJECTIVE   

VITAL SIGNS:  Temperature 98.6, pulse 77, respiratory rate 18, satting 95% 

on room air, blood pressure 142/54.

GENERAL:  Elderly white female, thin and ill-appearing.

CARDIOVASCULAR:  Regular rate and rhythm.  A 2/6 right upper sternal border 

murmur radiating to the carotid consistent with aortic stenosis.  Palpable 

radial pulses.  Palpable carotid pulses.  

LUNGS:  Clear to auscultation bilaterally.  Poor respiratory effort.

ABDOMEN:  Soft.  Mildly tender diffusely.  No rebound or guarding.  

Nondistended.

NEURO/PSYCH:  Alert and oriented to person, place and time.  



LABORATORY DATA:  Reviewed.



CARDIAC MEDICATIONS:  Reviewed.  



Telemetry data reviewed.  



ASSESSMENT AND PLAN 

1.  Paroxysmal atrial fibrillation.

2.  History of aortic valve endocarditis, treated conservatively with 

antibiotics.

3.  Moderate aortic stenosis.

4.  Hypertension.

5.  History of deep venous thrombosis/pulmonary embolism, status post 

inferior vena cava filter.

6.  Chronic venous insufficiency.

7.  History of brain aneurysm, status post ligation.



RECOMMENDATIONS:  Continue IV amiodarone while the patient is n.p.o. to 

keep her in sinus rhythm.  Once the patient is able to take p.o. intake, 

will receive oral medicines.  No anticoagulation at this time due to 

history of brain aneurysm and bleeding, and recent surgery.  



Thank you for this consult.  Will continue to follow.  









DD:  10/24/2018 16:57

DT:  10/24/2018 18:37

Job#:  [id number]

## 2018-10-25 VITALS — DIASTOLIC BLOOD PRESSURE: 70 MMHG | SYSTOLIC BLOOD PRESSURE: 167 MMHG

## 2018-10-25 VITALS — SYSTOLIC BLOOD PRESSURE: 150 MMHG | DIASTOLIC BLOOD PRESSURE: 66 MMHG

## 2018-10-25 VITALS — SYSTOLIC BLOOD PRESSURE: 130 MMHG | DIASTOLIC BLOOD PRESSURE: 61 MMHG

## 2018-10-25 VITALS — SYSTOLIC BLOOD PRESSURE: 137 MMHG | DIASTOLIC BLOOD PRESSURE: 62 MMHG

## 2018-10-25 VITALS — DIASTOLIC BLOOD PRESSURE: 71 MMHG | SYSTOLIC BLOOD PRESSURE: 163 MMHG

## 2018-10-25 LAB
ALBUMIN SERPL-MCNC: (no result) G/DL (ref 3.5–5)
ALBUMIN SERPL-MCNC: 2.1 G/DL (ref 3.5–5)
ALBUMIN/GLOB SERPL: (no result) {RATIO} (ref 0.8–2)
ALBUMIN/GLOB SERPL: 0.8 {RATIO} (ref 0.8–2)
ALP SERPL-CCNC: (no result) IU/L (ref 40–150)
ALP SERPL-CCNC: 49 IU/L (ref 40–150)
ALT SERPL-CCNC: (no result) IU/L (ref 0–55)
ALT SERPL-CCNC: 10 IU/L (ref 0–55)
ANION GAP SERPL CALC-SCNC: (no result) MMOL/L (ref 8–16)
ANION GAP SERPL CALC-SCNC: 8.2 MMOL/L (ref 8–16)
ANION GAP SERPL CALC-SCNC: 9.3 MMOL/L (ref 8–16)
ANISOCYTOSIS BLD QL SMEAR: SLIGHT
BASOPHILS # BLD AUTO: 0.1 10*3/UL (ref 0–0.1)
BASOPHILS NFR BLD AUTO: 0.4 % (ref 0–1)
BUN SERPL-MCNC: (no result) MG/DL (ref 7–26)
BUN SERPL-MCNC: 16 MG/DL (ref 7–26)
BUN SERPL-MCNC: 17 MG/DL (ref 7–26)
BUN/CREAT SERPL: (no result) (ref 6–25)
BUN/CREAT SERPL: 25 (ref 6–25)
BUN/CREAT SERPL: 25 (ref 6–25)
CALCIUM SERPL-MCNC: (no result) MG/DL (ref 8.4–10.2)
CALCIUM SERPL-MCNC: 8.6 MG/DL (ref 8.4–10.2)
CALCIUM SERPL-MCNC: 8.6 MG/DL (ref 8.4–10.2)
CHLORIDE SERPL-SCNC: (no result) MMOL/L (ref 98–107)
CHLORIDE SERPL-SCNC: 105 MMOL/L (ref 98–107)
CHLORIDE SERPL-SCNC: 106 MMOL/L (ref 98–107)
CO2 SERPL-SCNC: (no result) MMOL/L (ref 22–29)
CO2 SERPL-SCNC: 24 MMOL/L (ref 22–29)
CO2 SERPL-SCNC: 24 MMOL/L (ref 22–29)
DEPRECATED NEUTROPHILS # BLD AUTO: 10.6 10*3/UL (ref 2.1–6.9)
EGFRCR SERPLBLD CKD-EPI 2021: (no result) ML/MIN (ref 60–?)
EGFRCR SERPLBLD CKD-EPI 2021: > 60 ML/MIN (ref 60–?)
EGFRCR SERPLBLD CKD-EPI 2021: > 60 ML/MIN (ref 60–?)
EOSINOPHIL # BLD AUTO: 0.3 10*3/UL (ref 0–0.4)
EOSINOPHIL NFR BLD AUTO: 2.2 % (ref 0–6)
EOSINOPHIL NFR BLD MANUAL: 5 % (ref 0–7)
ERYTHROCYTE [DISTWIDTH] IN CORD BLOOD: 14 % (ref 11.7–14.4)
GLOBULIN PLAS-MCNC: (no result) G/DL (ref 2.3–3.5)
GLOBULIN PLAS-MCNC: 2.5 G/DL (ref 2.3–3.5)
GLUCOSE SERPLBLD-MCNC: (no result) MG/DL (ref 74–118)
GLUCOSE SERPLBLD-MCNC: 100 MG/DL (ref 74–118)
GLUCOSE SERPLBLD-MCNC: 119 MG/DL (ref 74–118)
HCT VFR BLD AUTO: 31.3 % (ref 34.2–44.1)
HGB BLD-MCNC: 10.4 G/DL (ref 12–16)
HYPOCHROMIA BLD QL SMEAR: SLIGHT
LYMPHOCYTES # BLD: 1.2 10*3/UL (ref 1–3.2)
LYMPHOCYTES NFR BLD AUTO: 8.2 % (ref 18–39.1)
LYMPHOCYTES NFR BLD MANUAL: 8 % (ref 19–48)
MCH RBC QN AUTO: 32.7 PG (ref 28–32)
MCHC RBC AUTO-ENTMCNC: 33.2 G/DL (ref 31–35)
MCV RBC AUTO: 98.4 FL (ref 81–99)
MONOCYTES # BLD AUTO: 1.9 10*3/UL (ref 0.2–0.8)
MONOCYTES NFR BLD AUTO: 13.3 % (ref 4.4–11.3)
MONOCYTES NFR BLD MANUAL: 14 % (ref 3.4–9)
NEUTS SEG NFR BLD AUTO: 74.6 % (ref 38.7–80)
NEUTS SEG NFR BLD MANUAL: 73 % (ref 40–74)
PLAT MORPH BLD: NORMAL
PLATELET # BLD AUTO: 309 X10E3/UL (ref 140–360)
PLATELET # BLD EST: ADEQUATE 10*3/UL
POTASSIUM SERPL-SCNC: (no result) MMOL/L (ref 3.5–5.1)
POTASSIUM SERPL-SCNC: 3.2 MMOL/L (ref 3.5–5.1)
POTASSIUM SERPL-SCNC: 3.3 MMOL/L (ref 3.5–5.1)
RBC # BLD AUTO: 3.18 X10E6/UL (ref 3.6–5.1)
RBC MORPH BLD: NORMAL
SODIUM SERPL-SCNC: (no result) MMOL/L (ref 136–145)
SODIUM SERPL-SCNC: 135 MMOL/L (ref 136–145)
SODIUM SERPL-SCNC: 135 MMOL/L (ref 136–145)

## 2018-10-25 PROCEDURE — 3E0436Z INTRODUCTION OF NUTRITIONAL SUBSTANCE INTO CENTRAL VEIN, PERCUTANEOUS APPROACH: ICD-10-PCS | Performed by: INTERNAL MEDICINE

## 2018-10-25 RX ADMIN — BISACODYL SCH MG: 10 SUPPOSITORY RECTAL at 21:33

## 2018-10-25 RX ADMIN — HYDROMORPHONE HYDROCHLORIDE PRN MG: 2 INJECTION INTRAMUSCULAR; INTRAVENOUS; SUBCUTANEOUS at 20:15

## 2018-10-25 RX ADMIN — SODIUM CHLORIDE SCH ML: 900 IRRIGANT IRRIGATION at 10:00

## 2018-10-25 RX ADMIN — SODIUM CHLORIDE SCH MG: 900 INJECTION INTRAVENOUS at 22:15

## 2018-10-25 RX ADMIN — SODIUM CHLORIDE SCH MLS/HR: 9 INJECTION, SOLUTION INTRAVENOUS at 15:00

## 2018-10-25 RX ADMIN — SODIUM CHLORIDE SCH ML: 900 IRRIGANT IRRIGATION at 06:01

## 2018-10-25 RX ADMIN — SODIUM CHLORIDE SCH ML: 900 IRRIGANT IRRIGATION at 18:09

## 2018-10-25 RX ADMIN — SODIUM CHLORIDE PRN MG: 900 INJECTION INTRAVENOUS at 06:18

## 2018-10-25 RX ADMIN — HYDROMORPHONE HYDROCHLORIDE PRN MG: 2 INJECTION INTRAMUSCULAR; INTRAVENOUS; SUBCUTANEOUS at 14:18

## 2018-10-25 RX ADMIN — SODIUM CHLORIDE SCH ML: 900 IRRIGANT IRRIGATION at 21:33

## 2018-10-25 RX ADMIN — VANCOMYCIN HYDROCHLORIDE SCH MLS/HR: 1 INJECTION, SOLUTION INTRAVENOUS at 22:58

## 2018-10-25 RX ADMIN — BISACODYL SCH MG: 10 SUPPOSITORY RECTAL at 21:00

## 2018-10-25 RX ADMIN — VANCOMYCIN HYDROCHLORIDE SCH MLS/HR: 1 INJECTION, SOLUTION INTRAVENOUS at 12:00

## 2018-10-25 RX ADMIN — SODIUM CHLORIDE SCH ML: 900 IRRIGANT IRRIGATION at 14:00

## 2018-10-25 RX ADMIN — Medication SCH GM: at 09:00

## 2018-10-25 RX ADMIN — SODIUM CHLORIDE SCH ML: 900 IRRIGANT IRRIGATION at 01:43

## 2018-10-25 RX ADMIN — BISACODYL SCH MG: 10 SUPPOSITORY RECTAL at 14:17

## 2018-10-25 RX ADMIN — SODIUM CHLORIDE SCH GM: 9 INJECTION, SOLUTION INTRAVENOUS at 11:00

## 2018-10-25 RX ADMIN — HYDROMORPHONE HYDROCHLORIDE PRN MG: 2 INJECTION INTRAMUSCULAR; INTRAVENOUS; SUBCUTANEOUS at 06:15

## 2018-10-25 RX ADMIN — Medication SCH MLS/HR: at 09:00

## 2018-10-25 RX ADMIN — SODIUM CHLORIDE SCH GM: 9 INJECTION, SOLUTION INTRAVENOUS at 22:58

## 2018-10-25 NOTE — PROGRESS NOTE
DATE:  October 25, 2018 



CARDIOLOGY PROGRESS NOTE



SUBJECTIVE:  No major events overnight.  Patient was transferred out of the 

ICU.



OBJECTIVE   

VITAL SIGNS:  Temperature 99.7, pulse 77, respiratory rate 15, blood 

pressure 147/62, satting 96% on 2 L nasal cannula. 

GENERAL:  Elderly white female, thin and ill-appearing. 

CARDIOVASCULAR:  Regular rate and rhythm.  A 2/6 systolic murmur at right 

upper sternal border radiating into the carotids consistent with aortic 

stenosis.  Palpable radial pulses.  Palpable carotid pulses. 

LUNGS:  Clear to auscultation bilaterally.  Poor respiratory effort. 

ABDOMEN:  Soft, mildly tender diffusely.  Postsurgical dressing in place.  

No rebound.  No guarding. 

NEURO AND PSYCH:  Alert and oriented to person, place, and time. 



LABORATORY DATA:  Reviewed.  



CARDIAC MEDICATIONS:  Reviewed.  Telemetry data reviewed.  Shows normal 

sinus rhythm.  



ASSESSMENT AND PLAN

1.  Paroxysmal atrial fibrillation.

2.  History of aortic valve endocarditis, treated conservative with 

antibiotics.

3.  Moderate aortic stenosis.

4.  Hypertension.

5.  History of deep venous thrombosis and pulmonary embolism, status post 

inferior vena cava filter.

6.  Chronic venous insufficiency.

7.  History of brain aneurysm, status post ligation.



PLAN:  Continue IV amiodarone while the patient is n.p.o. to keep her in 

sinus rhythm.  Once the patient is able to take p.o., will resume her oral 

cardiovascular medications.  No anticoagulation given recent postsurgical 

status, open abdominal surgery.  



Thank you for this consult.  Will continue to follow.











DD:  10/25/2018 09:19

DT:  10/25/2018 09:33

Job#:  [id number]

## 2018-10-26 VITALS — DIASTOLIC BLOOD PRESSURE: 83 MMHG | SYSTOLIC BLOOD PRESSURE: 142 MMHG

## 2018-10-26 VITALS — SYSTOLIC BLOOD PRESSURE: 156 MMHG | DIASTOLIC BLOOD PRESSURE: 70 MMHG

## 2018-10-26 VITALS — SYSTOLIC BLOOD PRESSURE: 148 MMHG | DIASTOLIC BLOOD PRESSURE: 72 MMHG

## 2018-10-26 VITALS — DIASTOLIC BLOOD PRESSURE: 87 MMHG | SYSTOLIC BLOOD PRESSURE: 157 MMHG

## 2018-10-26 VITALS — DIASTOLIC BLOOD PRESSURE: 69 MMHG | SYSTOLIC BLOOD PRESSURE: 148 MMHG

## 2018-10-26 VITALS — SYSTOLIC BLOOD PRESSURE: 154 MMHG | DIASTOLIC BLOOD PRESSURE: 79 MMHG

## 2018-10-26 VITALS — DIASTOLIC BLOOD PRESSURE: 70 MMHG | SYSTOLIC BLOOD PRESSURE: 156 MMHG

## 2018-10-26 LAB
ALBUMIN SERPL-MCNC: 2 G/DL (ref 3.5–5)
ALBUMIN/GLOB SERPL: 0.7 {RATIO} (ref 0.8–2)
ALP SERPL-CCNC: 54 IU/L (ref 40–150)
ALT SERPL-CCNC: 11 IU/L (ref 0–55)
ANION GAP SERPL CALC-SCNC: 9.8 MMOL/L (ref 8–16)
BASOPHILS # BLD AUTO: 0 10*3/UL (ref 0–0.1)
BASOPHILS NFR BLD AUTO: 0.3 % (ref 0–1)
BUN SERPL-MCNC: 15 MG/DL (ref 7–26)
BUN/CREAT SERPL: 23 (ref 6–25)
CALCIUM SERPL-MCNC: 9 MG/DL (ref 8.4–10.2)
CHLORIDE SERPL-SCNC: 108 MMOL/L (ref 98–107)
CO2 SERPL-SCNC: 25 MMOL/L (ref 22–29)
DEPRECATED NEUTROPHILS # BLD AUTO: 8.9 10*3/UL (ref 2.1–6.9)
EGFRCR SERPLBLD CKD-EPI 2021: > 60 ML/MIN (ref 60–?)
EOSINOPHIL # BLD AUTO: 0.5 10*3/UL (ref 0–0.4)
EOSINOPHIL NFR BLD AUTO: 4.1 % (ref 0–6)
ERYTHROCYTE [DISTWIDTH] IN CORD BLOOD: 13.8 % (ref 11.7–14.4)
GLOBULIN PLAS-MCNC: 2.7 G/DL (ref 2.3–3.5)
GLUCOSE SERPLBLD-MCNC: 130 MG/DL (ref 74–118)
HCT VFR BLD AUTO: 30.3 % (ref 34.2–44.1)
HGB BLD-MCNC: 10.1 G/DL (ref 12–16)
LYMPHOCYTES # BLD: 1.1 10*3/UL (ref 1–3.2)
LYMPHOCYTES NFR BLD AUTO: 9.2 % (ref 18–39.1)
MCH RBC QN AUTO: 32.6 PG (ref 28–32)
MCHC RBC AUTO-ENTMCNC: 33.3 G/DL (ref 31–35)
MCV RBC AUTO: 97.7 FL (ref 81–99)
MONOCYTES # BLD AUTO: 1.5 10*3/UL (ref 0.2–0.8)
MONOCYTES NFR BLD AUTO: 12.3 % (ref 4.4–11.3)
NEUTS SEG NFR BLD AUTO: 73.1 % (ref 38.7–80)
PLATELET # BLD AUTO: 305 X10E3/UL (ref 140–360)
POTASSIUM SERPL-SCNC: 3.8 MMOL/L (ref 3.5–5.1)
RBC # BLD AUTO: 3.1 X10E6/UL (ref 3.6–5.1)
SODIUM SERPL-SCNC: 139 MMOL/L (ref 136–145)

## 2018-10-26 RX ADMIN — MINERAL OIL SCH ML: 100 ENEMA RECTAL at 15:27

## 2018-10-26 RX ADMIN — HYDROMORPHONE HYDROCHLORIDE PRN MG: 2 INJECTION INTRAMUSCULAR; INTRAVENOUS; SUBCUTANEOUS at 15:27

## 2018-10-26 RX ADMIN — SODIUM CHLORIDE SCH ML: 900 IRRIGANT IRRIGATION at 05:57

## 2018-10-26 RX ADMIN — Medication SCH GM: at 09:33

## 2018-10-26 RX ADMIN — SODIUM CHLORIDE SCH MLS/HR: 9 INJECTION, SOLUTION INTRAVENOUS at 11:00

## 2018-10-26 RX ADMIN — SODIUM CHLORIDE SCH GM: 9 INJECTION, SOLUTION INTRAVENOUS at 11:38

## 2018-10-26 RX ADMIN — BISACODYL SCH MG: 10 SUPPOSITORY RECTAL at 22:00

## 2018-10-26 RX ADMIN — SODIUM CHLORIDE SCH GM: 9 INJECTION, SOLUTION INTRAVENOUS at 23:00

## 2018-10-26 RX ADMIN — MINERAL OIL SCH ML: 100 ENEMA RECTAL at 22:00

## 2018-10-26 RX ADMIN — VANCOMYCIN HYDROCHLORIDE SCH MLS/HR: 1 INJECTION, SOLUTION INTRAVENOUS at 23:00

## 2018-10-26 RX ADMIN — HYDROMORPHONE HYDROCHLORIDE PRN MG: 2 INJECTION INTRAMUSCULAR; INTRAVENOUS; SUBCUTANEOUS at 09:09

## 2018-10-26 RX ADMIN — SODIUM CHLORIDE SCH ML: 900 IRRIGANT IRRIGATION at 22:00

## 2018-10-26 RX ADMIN — Medication SCH MLS/HR: at 04:40

## 2018-10-26 RX ADMIN — SODIUM CHLORIDE SCH ML: 900 IRRIGANT IRRIGATION at 10:22

## 2018-10-26 RX ADMIN — SODIUM CHLORIDE SCH MG: 900 INJECTION INTRAVENOUS at 22:15

## 2018-10-26 RX ADMIN — BISACODYL SCH MG: 10 SUPPOSITORY RECTAL at 09:33

## 2018-10-26 RX ADMIN — VANCOMYCIN HYDROCHLORIDE SCH MLS/HR: 1 INJECTION, SOLUTION INTRAVENOUS at 11:50

## 2018-10-26 RX ADMIN — CASTOR OIL AND BALSAM, PERU SCH GM: 788; 87 OINTMENT TOPICAL at 09:34

## 2018-10-26 RX ADMIN — SODIUM CHLORIDE SCH ML: 900 IRRIGANT IRRIGATION at 18:00

## 2018-10-26 RX ADMIN — SODIUM CHLORIDE PRN MG: 900 INJECTION INTRAVENOUS at 09:08

## 2018-10-26 RX ADMIN — SODIUM CHLORIDE SCH ML: 900 IRRIGANT IRRIGATION at 02:00

## 2018-10-26 RX ADMIN — BISACODYL SCH MG: 10 SUPPOSITORY RECTAL at 08:52

## 2018-10-26 RX ADMIN — SODIUM CHLORIDE SCH ML: 900 IRRIGANT IRRIGATION at 14:00

## 2018-10-26 NOTE — PROGRESS NOTE
DATE:  October 26, 2018 



CARDIOLOGY PROGRESS NOTE



SUBJECTIVE:  The patient denies chest pain or shortness of breath.  She 

remains n.p.o. with NG tube in place.



OBJECTIVE

VITAL SIGNS:  Temperature 98.2 degrees, pulse 70, respiratory rate 20, 

blood pressure 157/87, oxygen saturation 98% on 2 liters nasal cannula.

GENERAL:  Elderly woman, frail, awake and alert, in no acute distress.

LUNGS:  Clear to auscultation bilaterally.  No wheezes or crackles.

CARDIOVASCULAR:  Normal rate, regular rhythm.  2/6 systolic murmur at the 

right upper sternal border.

ABDOMEN:  Soft.  Mildly tender to palpation.

EXTREMITIES:  No edema.



CARDIAC MEDICATIONS:  Amiodarone 0.5 mg per minute.



LABS:  WBC 12.22, hemoglobin 10.1, hematocrit 30.3, platelets 305.  Sodium 

139, potassium 3.8, chloride 108, CO2 of 25, BUN 15, creatinine 0.66.



TELEMETRY:  Normal sinus rhythm.



IMPRESSION

1. Paroxysmal atrial fibrillation.

2. History of aortic valve endocarditis, status post conservative 

treatment with IV antibiotics.

3. Moderate aortic stenosis.

4. Hypertension.

5. History of deep venous thrombosis and pulmonary embolism, status post 

inferior vena cava filter.

6. Chronic venous insufficiency.

7. History of brain aneurysm, status post ligation.

8. Small bowel obstruction, status post exploratory laparotomy and 

enterolysis with release of closed loop small bowel obstruction.



RECOMMENDATIONS:  Continue intravenous amiodarone while patient is n.p.o. 

to keep her sinus rhythm.  No anticoagulation at this time given recent 

surgery.  Plan to resume once hemostasis achieve from a surgical 

standpoint and patient can tolerate p.o. intake.  At this time, plan to 

start p.o. metoprolol and p.o. amiodarone.  However, patient is currently 

requiring TPN.  Monitor patient on telemetry.



Thank you for this consult.  We will continue to follow.







DD:  10/26/2018 17:24

DT:  10/26/2018 17:29

Job#:  [id number] GIOVANI CAZARES

## 2018-10-27 VITALS — DIASTOLIC BLOOD PRESSURE: 61 MMHG | SYSTOLIC BLOOD PRESSURE: 144 MMHG

## 2018-10-27 VITALS — SYSTOLIC BLOOD PRESSURE: 178 MMHG | DIASTOLIC BLOOD PRESSURE: 71 MMHG

## 2018-10-27 VITALS — SYSTOLIC BLOOD PRESSURE: 147 MMHG | DIASTOLIC BLOOD PRESSURE: 67 MMHG

## 2018-10-27 VITALS — SYSTOLIC BLOOD PRESSURE: 145 MMHG | DIASTOLIC BLOOD PRESSURE: 65 MMHG

## 2018-10-27 VITALS — DIASTOLIC BLOOD PRESSURE: 70 MMHG | SYSTOLIC BLOOD PRESSURE: 150 MMHG

## 2018-10-27 LAB
BASOPHILS # BLD AUTO: 0 10*3/UL (ref 0–0.1)
BASOPHILS NFR BLD AUTO: 0.4 % (ref 0–1)
DEPRECATED NEUTROPHILS # BLD AUTO: 6.7 10*3/UL (ref 2.1–6.9)
EOSINOPHIL # BLD AUTO: 0.4 10*3/UL (ref 0–0.4)
EOSINOPHIL NFR BLD AUTO: 4.1 % (ref 0–6)
ERYTHROCYTE [DISTWIDTH] IN CORD BLOOD: 13.6 % (ref 11.7–14.4)
HCT VFR BLD AUTO: 32.2 % (ref 34.2–44.1)
HGB BLD-MCNC: 10.5 G/DL (ref 12–16)
LYMPHOCYTES # BLD: 0.9 10*3/UL (ref 1–3.2)
LYMPHOCYTES NFR BLD AUTO: 10.1 % (ref 18–39.1)
MCH RBC QN AUTO: 32.4 PG (ref 28–32)
MCHC RBC AUTO-ENTMCNC: 32.6 G/DL (ref 31–35)
MCV RBC AUTO: 99.4 FL (ref 81–99)
MONOCYTES # BLD AUTO: 1 10*3/UL (ref 0.2–0.8)
MONOCYTES NFR BLD AUTO: 11.1 % (ref 4.4–11.3)
NEUTS SEG NFR BLD AUTO: 72.9 % (ref 38.7–80)
PLATELET # BLD AUTO: 345 X10E3/UL (ref 140–360)
RBC # BLD AUTO: 3.24 X10E6/UL (ref 3.6–5.1)

## 2018-10-27 RX ADMIN — MINERAL OIL SCH ML: 100 ENEMA RECTAL at 17:00

## 2018-10-27 RX ADMIN — Medication SCH MLS/HR: at 13:54

## 2018-10-27 RX ADMIN — HYDROMORPHONE HYDROCHLORIDE PRN MG: 2 INJECTION INTRAMUSCULAR; INTRAVENOUS; SUBCUTANEOUS at 04:00

## 2018-10-27 RX ADMIN — SODIUM CHLORIDE SCH ML: 900 IRRIGANT IRRIGATION at 06:00

## 2018-10-27 RX ADMIN — SODIUM CHLORIDE SCH ML: 900 IRRIGANT IRRIGATION at 02:00

## 2018-10-27 RX ADMIN — SODIUM CHLORIDE SCH MG: 900 INJECTION INTRAVENOUS at 21:22

## 2018-10-27 RX ADMIN — Medication SCH GM: at 10:30

## 2018-10-27 RX ADMIN — CASTOR OIL AND BALSAM, PERU SCH GM: 788; 87 OINTMENT TOPICAL at 10:30

## 2018-10-27 RX ADMIN — MINERAL OIL SCH ML: 100 ENEMA RECTAL at 10:30

## 2018-10-27 RX ADMIN — SODIUM CHLORIDE SCH ML: 900 IRRIGANT IRRIGATION at 10:00

## 2018-10-27 RX ADMIN — SODIUM CHLORIDE PRN MG: 900 INJECTION INTRAVENOUS at 19:17

## 2018-10-27 RX ADMIN — HYDROMORPHONE HYDROCHLORIDE PRN MG: 2 INJECTION INTRAMUSCULAR; INTRAVENOUS; SUBCUTANEOUS at 19:16

## 2018-10-27 RX ADMIN — SODIUM CHLORIDE SCH ML: 900 IRRIGANT IRRIGATION at 13:54

## 2018-10-27 RX ADMIN — BISACODYL SCH MG: 10 SUPPOSITORY RECTAL at 10:30

## 2018-10-27 RX ADMIN — SODIUM CHLORIDE SCH ML: 900 IRRIGANT IRRIGATION at 10:01

## 2018-10-27 RX ADMIN — HYDROMORPHONE HYDROCHLORIDE PRN MG: 2 INJECTION INTRAMUSCULAR; INTRAVENOUS; SUBCUTANEOUS at 00:00

## 2018-10-27 NOTE — PROGRESS NOTE
DATE:  October 27, 2018 



INTERNAL MEDICINE PROGRESS NOTE



SUBJECTIVE:  Patient is doing well.  No significant complaints today.



PHYSICAL EXAM

VITAL SIGNS:  Blood pressure 147/67, temperature 97.5, heart rate 65 per 

minute, respiratory rate 18 per minute, oxygen saturation 98%.

HEART:  Irregularly irregular heart rate.  Normal S1, S2 sounds. 

LUNGS:  Clear bilaterally. 

ABDOMEN:  Soft. 

EXTREMITIES:  Show no evidence of cyanosis, edema or trauma.



LABORATORY DATA:  On the BMP, sodium 139, potassium 3.8, chloride 108, CO2 

of 25, BUN 15, creatinine 0.66, glucose 130.  On the CBC, white blood count 

12.2, hemoglobin 10.1, hematocrit 30.3, platelet count 305,000.  PT 13.0, 

INR 0.90, PTT 32.2.  AST 12, ALT 11, total bilirubin 0.7, alkaline 

phosphatase 54.



FINAL IMPRESSION

1. Small bowel obstruction, status post small bowel resection.

2. Lower pneumonia.

3. Chronic anemia.

4. Chronic atrial fibrillation.

5. Sepsis.





PLAN OF TREATMENT:  Continue amiodarone daily and TPN.  Continue with 

metoprolol 2.5 mg IV q.6 hours as needed.  Balsam [place]-castor oil daily.  

Protonix 40 mg IV once a day.  Mineral oil twice a day, Dilaudid 0.5 mg IV 

q.3 hours as needed.  Dr. Raimundo Acevedo is seeing her from the surgical 

point of view, he will decide when the patient can eat.









DD:  10/27/2018 15:03

DT:  10/27/2018 16:02

Job#:  [id number]

## 2018-10-27 NOTE — PROGRESS NOTE
DATE:  October 27, 2018 



CARDIOLOGY PROGRESS NOTE



SUBJECTIVE:  The patient denies chest pain or shortness of breath.



OBJECTIVE

VITAL SIGNS:  Temperature 97.5 degrees, pulse 65, respiratory rate 18, 

blood pressure 147/67, oxygen saturation 98% on 2 liters nasal cannula.

GENERAL:  Elderly woman, frail, in no acute distress, awake and alert.

LUNGS:  Clear to auscultation bilaterally.  No wheezes or crackles.

CARDIOVASCULAR:  Normal rate, regular rhythm.  2/6 systolic murmur at the 

right upper sternal border.

ABDOMEN:  Soft.  Mildly tender to palpation.

EXTREMITIES:  No edema.



CARDIAC MEDICATIONS:  Amiodarone 0.5 mg per minute.



LABS:  None today.



TELEMETRY:  Normal sinus rhythm.



IMPRESSION

1. Paroxysmal atrial fibrillation, currently sinus rhythm.

2. History of aortic valve endocarditis, status post conservative 

treatment with IV antibiotics.

3. Moderate aortic stenosis.

4. Hypertension.

5. History of deep venous thrombosis/pulmonary embolism, status post 

inferior vena cava filter.

6. Chronic venous insufficiency.

7. History of brain aneurysm, status post ligation.

8. Small bowel obstruction, status post exploratory laparotomy and 

enterolysis with release of closed loop small bowel obstruction.



RECOMMENDATIONS:  Continue intravenous amiodarone while patient is n.p.o. 

to keep her sinus rhythm.  No anticoagulation at this time given recent 

surgery.  Would like to start once hemostasis achieved from a surgical 

standpoint and patient can tolerate p.o. intake.  Transition to p.o. 

metoprolol and amiodarone once cleared from surgery and no longer on TPN.  

Keep patient on telemetry.



Thank you for this consult.  We will continue to follow.









DD:  10/27/2018 14:22

DT:  10/27/2018 15:08

Job#:  [id number]

## 2018-10-28 VITALS — SYSTOLIC BLOOD PRESSURE: 118 MMHG | DIASTOLIC BLOOD PRESSURE: 60 MMHG

## 2018-10-28 VITALS — DIASTOLIC BLOOD PRESSURE: 66 MMHG | SYSTOLIC BLOOD PRESSURE: 153 MMHG

## 2018-10-28 VITALS — DIASTOLIC BLOOD PRESSURE: 77 MMHG | SYSTOLIC BLOOD PRESSURE: 133 MMHG

## 2018-10-28 VITALS — SYSTOLIC BLOOD PRESSURE: 140 MMHG | DIASTOLIC BLOOD PRESSURE: 59 MMHG

## 2018-10-28 VITALS — SYSTOLIC BLOOD PRESSURE: 140 MMHG | DIASTOLIC BLOOD PRESSURE: 63 MMHG

## 2018-10-28 VITALS — DIASTOLIC BLOOD PRESSURE: 76 MMHG | SYSTOLIC BLOOD PRESSURE: 134 MMHG

## 2018-10-28 RX ADMIN — METOPROLOL TARTRATE SCH MG: 25 TABLET, FILM COATED ORAL at 10:47

## 2018-10-28 RX ADMIN — AMIODARONE HYDROCHLORIDE SCH MG: 200 TABLET ORAL at 10:00

## 2018-10-28 RX ADMIN — SODIUM CHLORIDE SCH MG: 900 INJECTION INTRAVENOUS at 21:07

## 2018-10-28 RX ADMIN — METOPROLOL TARTRATE SCH MG: 25 TABLET, FILM COATED ORAL at 21:06

## 2018-10-28 RX ADMIN — SODIUM CHLORIDE PRN MG: 900 INJECTION INTRAVENOUS at 18:44

## 2018-10-28 RX ADMIN — HYDROMORPHONE HYDROCHLORIDE PRN MG: 2 INJECTION INTRAMUSCULAR; INTRAVENOUS; SUBCUTANEOUS at 18:43

## 2018-10-28 RX ADMIN — CASTOR OIL AND BALSAM, PERU SCH GM: 788; 87 OINTMENT TOPICAL at 21:07

## 2018-10-28 RX ADMIN — AMIODARONE HYDROCHLORIDE SCH MG: 200 TABLET ORAL at 21:06

## 2018-10-28 NOTE — PROGRESS NOTE
DATE:  October 28, 2018 



CARDIOLOGY PROGRESS NOTE



SUBJECTIVE:  The patient denies chest pain or shortness of breath.



OBJECTIVE

VITAL SIGNS:  Temperature 98.2 degrees, pulse 60, respiratory rate 20, 

blood pressure 140/59, oxygen saturation 100% on 2 liters nasal cannula.

GENERAL:  Elderly woman, in no acute distress, frail, awake and alert.

LUNGS:  Clear to auscultation bilaterally.  No wheezes or crackles.

CARDIOVASCULAR:  Normal rate, regular rhythm.  2/6 systolic murmur at the 

right upper sternal border.

ABDOMEN:  Soft.  Mildly tender to palpation.

EXTREMITIES:  No edema.



CARDIAC MEDICATIONS

1. Metoprolol tartrate 12.5 mg q.12 hours.

2. Amiodarone 200 mg p.o. q.12 hours.



LABS:  None today.



TELEMETRY:  Normal sinus rhythm.



IMPRESSION

1. Paroxysmal atrial fibrillation, currently sinus rhythm.

2. History of aortic valve endocarditis, status post conservative 

treatment with IV antibiotics.

3. Moderate aortic stenosis.

4. Hypertension.

5. History of deep venous thrombosis/pulmonary embolism, status post 

inferior vena cava filter.

6. Chronic venous insufficiency.

7. History of brain aneurysm, status post ligation.

8. Small bowel obstruction, status post exploratory laparotomy and 

enterolysis with release of closed loop small bowel obstruction.



RECOMMENDATIONS:  Patient has been transitioned to p.o. amiodarone and 

metoprolol.  No anticoagulation at this time given recent surgery.  Would 

like to start once agreeable from a surgical standpoint.  Patient's diet 

has been advanced to clear liquids, may be able to initiate anticoagulation 

shortly.  Await decision by surgery.  Monitor patient on telemetry.



Thank you for this consult.  We will continue to follow.









DD:  10/28/2018 17:02

DT:  10/28/2018 18:06

Job#:  [id number] VAS

## 2018-10-28 NOTE — PROGRESS NOTE
DATE:  October 28, 2018 



INTERNAL MEDICINE PROGRESS NOTE



SUBJECTIVE:  She is doing better.  No complaints today.



PHYSICAL EXAM

VITAL SIGNS:  Blood pressure 133/77, temperature 98 degrees, heart rate is 

71 per minute, respiratory rate 20 per minute, oxygen saturation 98%.

HEART:  Shows regular rhythm.  Normal S1, S2 sounds.

LUNGS:  Clear bilaterally. 

ABDOMEN:  Soft.  He had incisions from the abdominal surgery.



LABORATORY DATA:  On the BMP, sodium 139, potassium 3.8, chloride 108, CO2 

25, BUN 15, creatinine 0.66, glucose 130.  CBC showed white blood count 

9.17, hemoglobin 10.5, hematocrit 32.2, platelet count 345,000.  PT 13.0, 

INR 0.90, PTT 32.2.  AST 12, ALT 11, total bilirubin 0.7, alkaline 

phosphatase 54.



FINAL IMPRESSION:  On this patient is

1. Small-bowel resection because of small-bowel obstruction.

2. Lobar pneumonia.

3. Chronic anemia.

4. Chronic atrial fibrillation.

5. Sepsis.



PLAN OF TREATMENT:  She is tolerating liquid diet.  Continue Zofran 4 mg IV 

q.4 hours.  Continue TPN.  Continue metoprolol 2.5 mg IV q.6 hours as 

needed, Protonix 40 mg IV daily, amiodarone 200 mg twice a day, Tylenol 650 

mg q.6 hours as needed.





Balsam [place]-castor oil daily.  Dilaudid 0.5 mg q.3 hours as needed for 

severe pain, metoprolol 12.5 mg twice a day.  Advance diet as tolerated and 

as directed by the surgeon.







DD:  10/28/2018 13:14

DT:  10/28/2018 14:15

Job#:  [id number] GEO

## 2018-10-29 VITALS — DIASTOLIC BLOOD PRESSURE: 61 MMHG | SYSTOLIC BLOOD PRESSURE: 156 MMHG

## 2018-10-29 VITALS — SYSTOLIC BLOOD PRESSURE: 150 MMHG | DIASTOLIC BLOOD PRESSURE: 53 MMHG

## 2018-10-29 VITALS — SYSTOLIC BLOOD PRESSURE: 114 MMHG | DIASTOLIC BLOOD PRESSURE: 66 MMHG

## 2018-10-29 VITALS — SYSTOLIC BLOOD PRESSURE: 148 MMHG | DIASTOLIC BLOOD PRESSURE: 62 MMHG

## 2018-10-29 VITALS — SYSTOLIC BLOOD PRESSURE: 140 MMHG | DIASTOLIC BLOOD PRESSURE: 57 MMHG

## 2018-10-29 VITALS — DIASTOLIC BLOOD PRESSURE: 57 MMHG | SYSTOLIC BLOOD PRESSURE: 140 MMHG

## 2018-10-29 VITALS — SYSTOLIC BLOOD PRESSURE: 122 MMHG | DIASTOLIC BLOOD PRESSURE: 58 MMHG

## 2018-10-29 LAB
ALBUMIN SERPL-MCNC: 2.2 G/DL (ref 3.5–5)
ALBUMIN/GLOB SERPL: 0.8 {RATIO} (ref 0.8–2)
ALP SERPL-CCNC: 70 IU/L (ref 40–150)
ALT SERPL-CCNC: 9 IU/L (ref 0–55)
ANION GAP SERPL CALC-SCNC: 10.5 MMOL/L (ref 8–16)
BASOPHILS # BLD AUTO: 0 10*3/UL (ref 0–0.1)
BASOPHILS NFR BLD AUTO: 0.5 % (ref 0–1)
BUN SERPL-MCNC: 16 MG/DL (ref 7–26)
BUN/CREAT SERPL: 22 (ref 6–25)
CALCIUM SERPL-MCNC: 9.6 MG/DL (ref 8.4–10.2)
CHLORIDE SERPL-SCNC: 105 MMOL/L (ref 98–107)
CO2 SERPL-SCNC: 27 MMOL/L (ref 22–29)
DEPRECATED NEUTROPHILS # BLD AUTO: 5.9 10*3/UL (ref 2.1–6.9)
EGFRCR SERPLBLD CKD-EPI 2021: > 60 ML/MIN (ref 60–?)
EOSINOPHIL # BLD AUTO: 0.3 10*3/UL (ref 0–0.4)
EOSINOPHIL NFR BLD AUTO: 3.4 % (ref 0–6)
ERYTHROCYTE [DISTWIDTH] IN CORD BLOOD: 13.9 % (ref 11.7–14.4)
GLOBULIN PLAS-MCNC: 2.6 G/DL (ref 2.3–3.5)
GLUCOSE SERPLBLD-MCNC: 122 MG/DL (ref 74–118)
HCT VFR BLD AUTO: 30.7 % (ref 34.2–44.1)
HGB BLD-MCNC: 10.2 G/DL (ref 12–16)
LYMPHOCYTES # BLD: 1.3 10*3/UL (ref 1–3.2)
LYMPHOCYTES NFR BLD AUTO: 15.3 % (ref 18–39.1)
MAGNESIUM SERPL-MCNC: 2 MG/DL (ref 1.3–2.1)
MCH RBC QN AUTO: 32.8 PG (ref 28–32)
MCHC RBC AUTO-ENTMCNC: 33.2 G/DL (ref 31–35)
MCV RBC AUTO: 98.7 FL (ref 81–99)
MONOCYTES # BLD AUTO: 0.9 10*3/UL (ref 0.2–0.8)
MONOCYTES NFR BLD AUTO: 10.4 % (ref 4.4–11.3)
NEUTS SEG NFR BLD AUTO: 69 % (ref 38.7–80)
PLATELET # BLD AUTO: 365 X10E3/UL (ref 140–360)
POTASSIUM SERPL-SCNC: 4.5 MMOL/L (ref 3.5–5.1)
RBC # BLD AUTO: 3.11 X10E6/UL (ref 3.6–5.1)
SODIUM SERPL-SCNC: 138 MMOL/L (ref 136–145)
TRIGL SERPL-MCNC: 88 MG/DL (ref 0–149)

## 2018-10-29 RX ADMIN — METOPROLOL TARTRATE SCH MG: 25 TABLET, FILM COATED ORAL at 20:30

## 2018-10-29 RX ADMIN — AMIODARONE HYDROCHLORIDE SCH MG: 200 TABLET ORAL at 10:00

## 2018-10-29 RX ADMIN — SODIUM CHLORIDE SCH MG: 900 INJECTION INTRAVENOUS at 20:47

## 2018-10-29 RX ADMIN — CASTOR OIL AND BALSAM, PERU SCH GM: 788; 87 OINTMENT TOPICAL at 20:30

## 2018-10-29 RX ADMIN — AMIODARONE HYDROCHLORIDE SCH MG: 200 TABLET ORAL at 20:30

## 2018-10-29 RX ADMIN — METOPROLOL TARTRATE SCH MG: 25 TABLET, FILM COATED ORAL at 10:00

## 2018-10-29 NOTE — DIAGNOSTIC IMAGING REPORT
EXAMINATION: PA and lateral views of the chest.



COMPARISON: AP chest 10/22/2018



CLINICAL HISTORY:  Shortness of breath,

     

DISCUSSION: Exam mildly limited by patient position. 



Lines/tubes:  Right-sided PICC line has distal tip projecting in the region of

the mid SVC



Lungs:  Lungs are relatively well inflated. Left retrocardiac opacity with

blunting of the left lateral and posterior costophrenic sulci, left effusion

and associated atelectasis. Right lung is grossly clear.



Pleura:  No pneumothorax.



Heart and mediastinum:  Cardiomediastinal silhouette is unremarkable.   

Pulmonary vasculature is normal.



Bones and soft tissues:  No acute bony abnormalities.  Degenerative changes in

the thoracic spine



IMPRESSION: 

Right-sided pleural effusion and associated atelectasis and/or consolidation.











Signed by: Dr. Josesito Whittaker M.D. on 10/29/2018 4:40 PM

## 2018-10-29 NOTE — PROGRESS NOTE
DATE:  October 29, 2018 



CARDIOLOGY PROGRESS NOTE



SUBJECTIVE:  No major events overnight.  Tolerating a liquid diet, walking 

in the halls today, about 100 feet or so.  No chest pain, no shortness of 

breath.  Remains in sinus rhythm.



OBJECTIVE  

VITAL SIGNS:  Temperature afebrile.  Heart rate 61, blood pressure 124/82, 

satting 95% on nasal cannula. 

GENERAL:  Thin, white female, in no acute distress. 

CARDIOVASCULAR:  Regular rate and rhythm.  Right upper sternal 2/6 systolic 

murmur radiating to the carotids.  Palpable carotid pulses.  Palpable 

radial pulses.  No lower extremity edema.  

LUNGS:  Clear to auscultation bilaterally.  No respiratory distress. 

ABDOMEN:  Soft, dressing in place.  Mildly tender.  Normal bowel sounds. 

NEURO AND PSYCH:  Alert and oriented to person, place, and time.  Normal 

affect. 



LABORATORY DATA:  Reviewed.  



IMAGING DATA:  Reviewed.  



TELEMETRY DATA:  Reviewed.  Shows normal sinus rhythm.  Heart rates in the 

60s to 70s.  



ASSESSMENT AND PLAN 

1. Paroxysmal atrial fibrillation.

2. Moderate aortic stenosis.

3. History of deep venous thrombosis, status post inferior vena cava 

filter.



PLAN:  Continue oral metoprolol and amiodarone for now in the perioperative 

period to maintain sinus rhythm on an appropriate rate control and apixaban 

day prior to discharge.  



Thank you for this consult.  Will continue to follow.









DD:  10/29/2018 21:48

DT:  10/29/2018 22:12

Job#:  [id number]

## 2018-10-30 VITALS — SYSTOLIC BLOOD PRESSURE: 157 MMHG | DIASTOLIC BLOOD PRESSURE: 92 MMHG

## 2018-10-30 VITALS — DIASTOLIC BLOOD PRESSURE: 68 MMHG | SYSTOLIC BLOOD PRESSURE: 149 MMHG

## 2018-10-30 VITALS — SYSTOLIC BLOOD PRESSURE: 149 MMHG | DIASTOLIC BLOOD PRESSURE: 68 MMHG

## 2018-10-30 VITALS — DIASTOLIC BLOOD PRESSURE: 53 MMHG | SYSTOLIC BLOOD PRESSURE: 89 MMHG

## 2018-10-30 VITALS — SYSTOLIC BLOOD PRESSURE: 184 MMHG | DIASTOLIC BLOOD PRESSURE: 87 MMHG

## 2018-10-30 VITALS — DIASTOLIC BLOOD PRESSURE: 93 MMHG | SYSTOLIC BLOOD PRESSURE: 174 MMHG

## 2018-10-30 VITALS — SYSTOLIC BLOOD PRESSURE: 156 MMHG | DIASTOLIC BLOOD PRESSURE: 69 MMHG

## 2018-10-30 VITALS — DIASTOLIC BLOOD PRESSURE: 140 MMHG | SYSTOLIC BLOOD PRESSURE: 175 MMHG

## 2018-10-30 VITALS — SYSTOLIC BLOOD PRESSURE: 157 MMHG | DIASTOLIC BLOOD PRESSURE: 74 MMHG

## 2018-10-30 VITALS — DIASTOLIC BLOOD PRESSURE: 99 MMHG | SYSTOLIC BLOOD PRESSURE: 181 MMHG

## 2018-10-30 VITALS — DIASTOLIC BLOOD PRESSURE: 82 MMHG | SYSTOLIC BLOOD PRESSURE: 170 MMHG

## 2018-10-30 VITALS — DIASTOLIC BLOOD PRESSURE: 80 MMHG | SYSTOLIC BLOOD PRESSURE: 121 MMHG

## 2018-10-30 VITALS — DIASTOLIC BLOOD PRESSURE: 64 MMHG | SYSTOLIC BLOOD PRESSURE: 134 MMHG

## 2018-10-30 LAB
ALBUMIN SERPL-MCNC: 2.9 G/DL (ref 3.5–5)
ALBUMIN/GLOB SERPL: 0.9 {RATIO} (ref 0.8–2)
ALP SERPL-CCNC: 82 IU/L (ref 40–150)
ALT SERPL-CCNC: 14 IU/L (ref 0–55)
ANION GAP SERPL CALC-SCNC: 12.5 MMOL/L (ref 8–16)
ANION GAP SERPL CALC-SCNC: 13.5 MMOL/L (ref 8–16)
BASOPHILS # BLD AUTO: 0.1 10*3/UL (ref 0–0.1)
BASOPHILS # BLD AUTO: 0.1 10*3/UL (ref 0–0.1)
BASOPHILS NFR BLD AUTO: 0.5 % (ref 0–1)
BASOPHILS NFR BLD AUTO: 0.5 % (ref 0–1)
BUN SERPL-MCNC: 15 MG/DL (ref 7–26)
BUN SERPL-MCNC: 16 MG/DL (ref 7–26)
BUN/CREAT SERPL: 20 (ref 6–25)
BUN/CREAT SERPL: 20 (ref 6–25)
CALCIUM SERPL-MCNC: 10.2 MG/DL (ref 8.4–10.2)
CALCIUM SERPL-MCNC: 9.8 MG/DL (ref 8.4–10.2)
CHLORIDE SERPL-SCNC: 102 MMOL/L (ref 98–107)
CHLORIDE SERPL-SCNC: 104 MMOL/L (ref 98–107)
CO2 SERPL-SCNC: 25 MMOL/L (ref 22–29)
CO2 SERPL-SCNC: 26 MMOL/L (ref 22–29)
DEPRECATED APTT PLAS QN: 32 SECONDS (ref 23.8–35.5)
DEPRECATED INR PLAS: 0.97
DEPRECATED NEUTROPHILS # BLD AUTO: 6.8 10*3/UL (ref 2.1–6.9)
DEPRECATED NEUTROPHILS # BLD AUTO: 8.9 10*3/UL (ref 2.1–6.9)
EGFRCR SERPLBLD CKD-EPI 2021: > 60 ML/MIN (ref 60–?)
EGFRCR SERPLBLD CKD-EPI 2021: > 60 ML/MIN (ref 60–?)
EOSINOPHIL # BLD AUTO: 0.3 10*3/UL (ref 0–0.4)
EOSINOPHIL # BLD AUTO: 0.3 10*3/UL (ref 0–0.4)
EOSINOPHIL NFR BLD AUTO: 2.7 % (ref 0–6)
EOSINOPHIL NFR BLD AUTO: 3.1 % (ref 0–6)
ERYTHROCYTE [DISTWIDTH] IN CORD BLOOD: 14 % (ref 11.7–14.4)
ERYTHROCYTE [DISTWIDTH] IN CORD BLOOD: 14 % (ref 11.7–14.4)
GLOBULIN PLAS-MCNC: 3.2 G/DL (ref 2.3–3.5)
GLUCOSE SERPLBLD-MCNC: 108 MG/DL (ref 74–118)
GLUCOSE SERPLBLD-MCNC: 112 MG/DL (ref 74–118)
HCT VFR BLD AUTO: 27.9 % (ref 34.2–44.1)
HCT VFR BLD AUTO: 30.6 % (ref 34.2–44.1)
HCT VFR BLD AUTO: 33.9 % (ref 34.2–44.1)
HGB BLD-MCNC: 10.3 G/DL (ref 12–16)
HGB BLD-MCNC: 11.2 G/DL (ref 12–16)
HGB BLD-MCNC: 9.2 G/DL (ref 12–16)
LYMPHOCYTES # BLD: 1.5 10*3/UL (ref 1–3.2)
LYMPHOCYTES # BLD: 2.3 10*3/UL (ref 1–3.2)
LYMPHOCYTES NFR BLD AUTO: 15.5 % (ref 18–39.1)
LYMPHOCYTES NFR BLD AUTO: 17.7 % (ref 18–39.1)
MCH RBC QN AUTO: 32.3 PG (ref 28–32)
MCH RBC QN AUTO: 32.9 PG (ref 28–32)
MCHC RBC AUTO-ENTMCNC: 33 G/DL (ref 31–35)
MCHC RBC AUTO-ENTMCNC: 33.7 G/DL (ref 31–35)
MCV RBC AUTO: 97.7 FL (ref 81–99)
MCV RBC AUTO: 97.8 FL (ref 81–99)
MONOCYTES # BLD AUTO: 0.8 10*3/UL (ref 0.2–0.8)
MONOCYTES # BLD AUTO: 1.1 10*3/UL (ref 0.2–0.8)
MONOCYTES NFR BLD AUTO: 8.5 % (ref 4.4–11.3)
MONOCYTES NFR BLD AUTO: 8.6 % (ref 4.4–11.3)
NEUTS SEG NFR BLD AUTO: 69.6 % (ref 38.7–80)
NEUTS SEG NFR BLD AUTO: 71.4 % (ref 38.7–80)
PLATELET # BLD AUTO: 355 X10E3/UL (ref 140–360)
PLATELET # BLD AUTO: 452 X10E3/UL (ref 140–360)
POTASSIUM SERPL-SCNC: 4.5 MMOL/L (ref 3.5–5.1)
POTASSIUM SERPL-SCNC: 4.5 MMOL/L (ref 3.5–5.1)
PROTHROMBIN TIME: 13.8 SECONDS (ref 11.9–14.5)
RBC # BLD AUTO: 3.13 X10E6/UL (ref 3.6–5.1)
RBC # BLD AUTO: 3.47 X10E6/UL (ref 3.6–5.1)
SODIUM SERPL-SCNC: 137 MMOL/L (ref 136–145)
SODIUM SERPL-SCNC: 137 MMOL/L (ref 136–145)

## 2018-10-30 PROCEDURE — 093K8ZZ CONTROL BLEEDING IN NASAL MUCOSA AND SOFT TISSUE, VIA NATURAL OR ARTIFICIAL OPENING ENDOSCOPIC: ICD-10-PCS

## 2018-10-30 RX ADMIN — HYDROMORPHONE HYDROCHLORIDE PRN MG: 2 INJECTION INTRAMUSCULAR; INTRAVENOUS; SUBCUTANEOUS at 13:15

## 2018-10-30 RX ADMIN — LIDOCAINE HYDROCHLORIDE SCH ML: 20 JELLY TOPICAL at 21:58

## 2018-10-30 RX ADMIN — METOPROLOL TARTRATE SCH MG: 25 TABLET, FILM COATED ORAL at 20:52

## 2018-10-30 RX ADMIN — METOPROLOL TARTRATE SCH MG: 25 TABLET, FILM COATED ORAL at 09:55

## 2018-10-30 RX ADMIN — CASTOR OIL AND BALSAM, PERU SCH GM: 788; 87 OINTMENT TOPICAL at 20:52

## 2018-10-30 RX ADMIN — SODIUM CHLORIDE SCH MG: 900 INJECTION INTRAVENOUS at 20:52

## 2018-10-30 RX ADMIN — SODIUM CHLORIDE PRN MG: 900 INJECTION INTRAVENOUS at 13:15

## 2018-10-30 RX ADMIN — AMIODARONE HYDROCHLORIDE SCH MG: 200 TABLET ORAL at 20:52

## 2018-10-30 RX ADMIN — AMIODARONE HYDROCHLORIDE SCH MG: 200 TABLET ORAL at 09:55

## 2018-10-30 NOTE — CONSULTATION
DATE OF CONSULTATION:  October 30, 2018 



HISTORY OF PRESENT ILLNESS:  I was kindly asked to see this pleasant 

86-year-old woman for evaluation of epistaxis.  Patient had no previous 

history of epistaxis  and was in the hospital for abdominal disorder.  She 

had a nasogastric tube placed for a period of approximately 10 days. 

Nasogastric tube was removed and the patient experienced brisk bilateral 

epistaxis, which was not controlled with topical therapy.  I was called 

emergently to stop the bleeding.  



Her history of present illness, past medical history and past surgical 

history were reviewed in the chart.  



PHYSICAL EXAMINATION:  She had a profuse nosebleed from both sides of the 

nose. Anshul-Synephrine was applied.  The dose suctioned out.  Afrin nasal 

spray was applied.  She continued to have significant epistaxis.  

Examination:  There was noted to be brisk epistaxis on both sides of her 

nose.  The right side significantly worse than the left.  There was fresh 

blood on the posterior pharyngeal wall and blood on multiple towels around 

the patient.  Anshul-Synephrine was applied.  The nose was suctioned out.  

Afrin nasal spray was applied and the nose was suctioned out again.  She 

had continued epistaxis.  On fiberoptic diagnostic rhinoscopy, there were 

no discrete bleeding points identified.  She had bright red blood along the 

posterior pharyngeal wall.  There was no palpable cervical adenopathy.  

RhinoRockets were inserted in both sides of the nose and pressure applied 

until the bleeding was controlled.  



ASSESSMENT:  Bilateral epistaxis, probable posterior bleed.  



PLAN:  

1. Continued observation with RhinoRockets in place with removal of the 

nasal packing based on clinical course.



2.  Serial hemoglobin evaluation.  



 





DD:  10/30/2018 19:07

DT:  10/30/2018 20:07

Job#:  [id number]

## 2018-10-30 NOTE — PROGRESS NOTE
DATE:  October 30, 2018 



CARDIOLOGY PROGRESS NOTE



SUBJECTIVE:  Patient with nosebleed earlier today with significant bleeding 

requiring nasal packing and tamponade, now improved.  Required dose of IV 

Dilaudid, and now is very sleepy and lethargic from this.  



OBJECTIVE   

VITAL SIGNS:  Temperature 98.9, pulse 86, respiratory rate 25, blood 

pressure 175/140, satting 95% on room air. 

GENERAL:  Sleepy elderly white female, frail and in no acute distress. 

CARDIOVASCULAR:  Regular rate and rhythm.  A 2/6 systolic murmur at right 

upper sternal border radiating to the carotids.  Palpable carotid pulses.  

Palpable radial pulses. 

LUNGS:  Clear to auscultation bilaterally.  No wheezes. 

ABDOMEN:  Soft, mildly tender, nondistended.  Normal bowel sounds. 

EXTREMITIES:  No edema. 



CARDIOVASCULAR MEDICATIONS:  Reviewed.



LABORATORY DATA:  Reviewed.  Telemetry data shows normal sinus rhythm.  



ASSESSMENT AND PLAN

1.  Paroxysmal atrial fibrillation, currently in sinus rhythm.

2.  History of aortic valve endocarditis:  Status post conservative 

treatment with intravenous antibiotics.

3.  Moderate aortic stenosis.

4.  Hypertension.

5.  History of deep venous thrombosis and pulmonary embolism:  Status post 

inferior vena cava filter.

6.  Chronic venous insufficiency.

7.  History of brain aneurysm:  Status post ligation.

8.  Small-bowel obstruction:  Status post exploratory laparotomy and 

enterolysis with release of closed loop small-bowel obstruction earlier 

this admission.

9.  Severe epistaxis.



ASSESSMENT AND PLAN:  Continue current cardiac medications.  Overall, doing 

better.  Tolerated the surgery and perioperative period well from 

cardiovascular standpoint.  Would like to restart her apixaban.  However, 

given recent surgery and now today severe epistaxis, will hold off for 

several more days.  Remains in sinus rhythm.  



Thank you for this consult.  Will continue to follow.











DD:  10/30/2018 15:05

DT:  10/30/2018 15:17

Job#:  [id number]

## 2018-10-31 VITALS — DIASTOLIC BLOOD PRESSURE: 76 MMHG | SYSTOLIC BLOOD PRESSURE: 176 MMHG

## 2018-10-31 VITALS — DIASTOLIC BLOOD PRESSURE: 75 MMHG | SYSTOLIC BLOOD PRESSURE: 146 MMHG

## 2018-10-31 VITALS — DIASTOLIC BLOOD PRESSURE: 82 MMHG | SYSTOLIC BLOOD PRESSURE: 133 MMHG

## 2018-10-31 VITALS — SYSTOLIC BLOOD PRESSURE: 162 MMHG | DIASTOLIC BLOOD PRESSURE: 76 MMHG

## 2018-10-31 VITALS — DIASTOLIC BLOOD PRESSURE: 70 MMHG | SYSTOLIC BLOOD PRESSURE: 155 MMHG

## 2018-10-31 VITALS — SYSTOLIC BLOOD PRESSURE: 145 MMHG | DIASTOLIC BLOOD PRESSURE: 65 MMHG

## 2018-10-31 VITALS — SYSTOLIC BLOOD PRESSURE: 139 MMHG | DIASTOLIC BLOOD PRESSURE: 92 MMHG

## 2018-10-31 VITALS — DIASTOLIC BLOOD PRESSURE: 78 MMHG | SYSTOLIC BLOOD PRESSURE: 160 MMHG

## 2018-10-31 VITALS — SYSTOLIC BLOOD PRESSURE: 146 MMHG | DIASTOLIC BLOOD PRESSURE: 75 MMHG

## 2018-10-31 VITALS — SYSTOLIC BLOOD PRESSURE: 114 MMHG | DIASTOLIC BLOOD PRESSURE: 60 MMHG

## 2018-10-31 VITALS — DIASTOLIC BLOOD PRESSURE: 64 MMHG | SYSTOLIC BLOOD PRESSURE: 146 MMHG

## 2018-10-31 VITALS — DIASTOLIC BLOOD PRESSURE: 68 MMHG | SYSTOLIC BLOOD PRESSURE: 153 MMHG

## 2018-10-31 VITALS — SYSTOLIC BLOOD PRESSURE: 149 MMHG | DIASTOLIC BLOOD PRESSURE: 76 MMHG

## 2018-10-31 VITALS — DIASTOLIC BLOOD PRESSURE: 74 MMHG | SYSTOLIC BLOOD PRESSURE: 143 MMHG

## 2018-10-31 VITALS — SYSTOLIC BLOOD PRESSURE: 163 MMHG | DIASTOLIC BLOOD PRESSURE: 76 MMHG

## 2018-10-31 LAB
HCT VFR BLD AUTO: 26.7 % (ref 34.2–44.1)
HCT VFR BLD AUTO: 27.7 % (ref 34.2–44.1)
HCT VFR BLD AUTO: 27.7 % (ref 34.2–44.1)
HCT VFR BLD AUTO: 28.3 % (ref 34.2–44.1)
HGB BLD-MCNC: 8.9 G/DL (ref 12–16)
HGB BLD-MCNC: 8.9 G/DL (ref 12–16)
HGB BLD-MCNC: 9.1 G/DL (ref 12–16)
HGB BLD-MCNC: 9.4 G/DL (ref 12–16)

## 2018-10-31 RX ADMIN — AMIODARONE HYDROCHLORIDE SCH MG: 200 TABLET ORAL at 09:00

## 2018-10-31 RX ADMIN — METOPROLOL TARTRATE PRN MG: 1 INJECTION, SOLUTION INTRAVENOUS at 16:58

## 2018-10-31 RX ADMIN — AMIODARONE HYDROCHLORIDE SCH MG: 200 TABLET ORAL at 21:07

## 2018-10-31 RX ADMIN — SODIUM CHLORIDE SCH MG: 900 INJECTION INTRAVENOUS at 23:07

## 2018-10-31 RX ADMIN — Medication PRN MG: at 23:34

## 2018-10-31 RX ADMIN — SODIUM CHLORIDE PRN MG: 900 INJECTION INTRAVENOUS at 23:34

## 2018-10-31 RX ADMIN — SODIUM CHLORIDE PRN MG: 900 INJECTION INTRAVENOUS at 16:41

## 2018-10-31 RX ADMIN — CASTOR OIL AND BALSAM, PERU SCH GM: 788; 87 OINTMENT TOPICAL at 21:07

## 2018-10-31 RX ADMIN — Medication PRN MG: at 16:40

## 2018-10-31 RX ADMIN — LIDOCAINE HYDROCHLORIDE SCH ML: 20 JELLY TOPICAL at 10:57

## 2018-10-31 RX ADMIN — METOPROLOL TARTRATE SCH MG: 25 TABLET, FILM COATED ORAL at 21:07

## 2018-10-31 RX ADMIN — LIDOCAINE HYDROCHLORIDE SCH ML: 20 JELLY TOPICAL at 18:44

## 2018-10-31 RX ADMIN — METOPROLOL TARTRATE SCH MG: 25 TABLET, FILM COATED ORAL at 09:00

## 2018-10-31 NOTE — PROGRESS NOTE
DATE:  October 31, 2018 



CARDIOLOGY PROGRESS NOTE 



SUBJECTIVE:  Continues to have nosebleed.  Nasal packing was restored this 

morning due to continuing nosebleed.  Otherwise, no complaints. 



OBJECTIVE   

VITAL SIGNS:  Temperature 99.6, pulse 93, respiratory rate 18, blood 

pressure 114/60, satting 98% on 2 liters nasal cannula. 

GENERAL:  Elderly white female, frail, no acute distress. 

CARDIOVASCULAR:  Regular rate and rhythm.  A 2/6 systolic murmur right 

upper sternal border radiating to the carotids.  Palpable carotid pulses.  

Palpable radial pulses. 

LUNGS:  Clear to auscultation bilaterally.  No wheezes. 

ABDOMEN:  Soft, nontender, nondistended.  Normal bowel sounds. 

EXTREMITIES:  No edema. 



CARDIOVASCULAR MEDICATIONS:  Reviewed.



TELEMETRY DATA:  Normal sinus rhythm with occasional sinus tachycardia.



LABORATORY DATA:  Reviewed.



ASSESSMENT AND PLAN

1. Paroxysmal atrial fibrillation, currently in sinus rhythm.

2. History of aortic valve endocarditis, status post conservative 

treatment with intravenous antibiotics.

3. Moderate aortic stenosis.

4. Hypertension.

5. History of deep venous thrombosis and pulmonary embolism. 

6. Chronic venous insufficiency.

7. History of brain aneurysm.

8. Small-bowel obstruction, status post exploratory laparotomy and 

enterolysis with release of closed-loop small-bowel obstruction earlier 

this admission.

9. Severe epistaxis.



ASSESSMENT AND PLAN:  Continue current cardiac medications.  Overall doing 

better.  Would like to resume her anticoagulation for atrial fibrillation 

and history of DVT.  However, given ongoing epistaxis, will have to hold 

off until cleared by ENT for this. 



Thank you for this consult.  Will continue to follow.







DD:  10/31/2018 15:22

DT:  10/31/2018 15:40

Job#:  [id number]

## 2018-11-01 VITALS — SYSTOLIC BLOOD PRESSURE: 147 MMHG | DIASTOLIC BLOOD PRESSURE: 70 MMHG

## 2018-11-01 VITALS — SYSTOLIC BLOOD PRESSURE: 136 MMHG | DIASTOLIC BLOOD PRESSURE: 76 MMHG

## 2018-11-01 VITALS — DIASTOLIC BLOOD PRESSURE: 75 MMHG | SYSTOLIC BLOOD PRESSURE: 140 MMHG

## 2018-11-01 VITALS — DIASTOLIC BLOOD PRESSURE: 69 MMHG | SYSTOLIC BLOOD PRESSURE: 131 MMHG

## 2018-11-01 VITALS — DIASTOLIC BLOOD PRESSURE: 72 MMHG | SYSTOLIC BLOOD PRESSURE: 151 MMHG

## 2018-11-01 VITALS — SYSTOLIC BLOOD PRESSURE: 131 MMHG | DIASTOLIC BLOOD PRESSURE: 86 MMHG

## 2018-11-01 VITALS — SYSTOLIC BLOOD PRESSURE: 154 MMHG | DIASTOLIC BLOOD PRESSURE: 73 MMHG

## 2018-11-01 VITALS — DIASTOLIC BLOOD PRESSURE: 86 MMHG | SYSTOLIC BLOOD PRESSURE: 131 MMHG

## 2018-11-01 LAB
HCT VFR BLD AUTO: 26.4 % (ref 34.2–44.1)
HCT VFR BLD AUTO: 27.3 % (ref 34.2–44.1)
HCT VFR BLD AUTO: 31.8 % (ref 34.2–44.1)
HGB BLD-MCNC: 10.7 G/DL (ref 12–16)
HGB BLD-MCNC: 8.7 G/DL (ref 12–16)
HGB BLD-MCNC: 8.9 G/DL (ref 12–16)

## 2018-11-01 PROCEDURE — 30233N1 TRANSFUSION OF NONAUTOLOGOUS RED BLOOD CELLS INTO PERIPHERAL VEIN, PERCUTANEOUS APPROACH: ICD-10-PCS | Performed by: INTERNAL MEDICINE

## 2018-11-01 PROCEDURE — 093K8ZZ CONTROL BLEEDING IN NASAL MUCOSA AND SOFT TISSUE, VIA NATURAL OR ARTIFICIAL OPENING ENDOSCOPIC: ICD-10-PCS

## 2018-11-01 RX ADMIN — Medication PRN MG: at 21:59

## 2018-11-01 RX ADMIN — CASTOR OIL AND BALSAM, PERU SCH GM: 788; 87 OINTMENT TOPICAL at 21:57

## 2018-11-01 RX ADMIN — Medication PRN MG: at 12:15

## 2018-11-01 RX ADMIN — SODIUM CHLORIDE PRN MG: 900 INJECTION INTRAVENOUS at 05:11

## 2018-11-01 RX ADMIN — SODIUM CHLORIDE PRN MG: 900 INJECTION INTRAVENOUS at 21:59

## 2018-11-01 RX ADMIN — AMIODARONE HYDROCHLORIDE SCH MG: 200 TABLET ORAL at 21:00

## 2018-11-01 RX ADMIN — LIDOCAINE HYDROCHLORIDE SCH ML: 20 JELLY TOPICAL at 17:00

## 2018-11-01 RX ADMIN — SODIUM CHLORIDE SCH MG: 900 INJECTION INTRAVENOUS at 21:58

## 2018-11-01 RX ADMIN — SODIUM CHLORIDE PRN MG: 900 INJECTION INTRAVENOUS at 12:15

## 2018-11-01 RX ADMIN — Medication PRN GM: at 09:00

## 2018-11-01 RX ADMIN — METOPROLOL TARTRATE SCH MG: 25 TABLET, FILM COATED ORAL at 21:00

## 2018-11-01 RX ADMIN — AMIODARONE HYDROCHLORIDE SCH MG: 200 TABLET ORAL at 09:36

## 2018-11-01 RX ADMIN — LIDOCAINE HYDROCHLORIDE SCH ML: 20 JELLY TOPICAL at 09:36

## 2018-11-01 RX ADMIN — Medication PRN MG: at 05:11

## 2018-11-01 RX ADMIN — METOPROLOL TARTRATE SCH MG: 25 TABLET, FILM COATED ORAL at 09:50

## 2018-11-01 NOTE — PROGRESS NOTE
DATE:  November 01, 2018 



CARDIOLOGY PROGRESS NOTE



SUBJECTIVE:  No major events overnight. 



OBJECTIVE   

VITAL SIGNS:  As noted in the medical record. 

GENERAL:  Thin, elderly white female, no acute distress. 

CARDIOVASCULAR:  Regular rate and rhythm.  A 2/6 systolic murmur in right 

upper sternal border radiating to the carotids. 

LUNGS:  Clear to auscultation bilaterally. 

ABDOMEN:  Soft, nontender, nondistended.  Normal bowel sounds. 

NEURO AND PSYCH:  Alert and oriented to person, place, and time.  Normal 

affect. 



LABORATORY DATA:  Reviewed.



TELEMETRY DATA:  Reviewed.  Shows normal sinus rhythm with occasional sinus 

tachycardia.



CARDIOVASCULAR MEDICATIONS:  Reviewed.



ASSESSMENT 

1. Moderate aortic stenosis.

2. History of aortic valve endocarditis, status post intravenous 

antibiotics.

3. History of atrial fibrillation, currently in sinus rhythm.

4. History of deep vein thrombosis and pulmonary embolism, on chronic 

anticoagulation.

5. Recent small-bowel obstruction, status post laparoscopic surgery and 

lysis of adhesions.

6. Severe epistaxis.



PLAN:  Continue current cardiovascular medications including metoprolol and 

amiodarone to maintain sinus rhythm and control heart rate.  Remains in 

normal sinus rhythm for now.  Would like to resume her apixaban for 

reducing stroke risk from atrial fibrillation; however, given ongoing 

epistaxis, will hold off until cleared by ENT.  



Thank you for this consult.  Will continue to follow.









DD:  11/01/2018 19:40

DT:  11/01/2018 20:24

Job#:  [id number] [id number]

## 2018-11-01 NOTE — OPERATIVE REPORT
DATE OF PROCEDURE:  November 01, 2018 

 

PREOPERATIVE DIAGNOSIS:  Epistaxis.



POSTOPERATIVE DIAGNOSIS:  Epistaxis.



OPERATION PERFORMED:  Endoscopic control of epistaxis.



ANESTHESIA:  General endotracheal.



ESTIMATED BLOOD LOSS:  200 mL.



COMPLICATIONS:  None.



OPERATIVE FINDINGS:  Apparent erosion from the NG tube into the right 

sphenopalatine artery. 



OPERATIVE INDICATIONS:  This 86-year-old woman presented with a history of 

small-bowel obstruction which necessitated prolonged nasogastric tube 

decompression. After removal of the nasogastric tube, she experienced brisk 

bleeding from her nose. It was initially controlled with Rhino Rocket. She 

had breakthrough bleeding which was temporarily controlled with additional 

pressure in the balloon of the Rhino Rocket. However, the following day she 

continued to have significant bleeding. The risks, benefits and 

alternatives to surgical intervention were discussed in detail with the 

patient and her family, and she gave her informed consent to have this 

procedure performed.



NARRATIVE REPORT:  After first obtaining adequate general endotracheal 

anesthesia, the patient was draped in the usual fashion. The nasal packing 

was removed. There was brisk bleeding from the right side and backup 

bleeding through the left side of the nose. There were bleeding points 

identified along the nasal septum. She had a marked nasal septal deviation 

toward the left anteriorly and toward the right posteriorly. The bleeding 

points on the nasal septum were cauterized, but she continued to have 

profuse bleeding arising from the nasopharyngeal region. The bleeding was 

then isolated to the right sphenopalatine artery region, and there was 

erosion of the posterior tip of the middle turbinate and into the 

nasopharynx, which was the apparent etiology for the epistaxis. The artery 

was cauterized with suction cautery and was packed with Afrin-soaked 

cottonoid pledgets. Additional mucosal bleeders were then cauterized using 

the suction cautery with the bleeding points primarily on the right side of 

the nose but there were also bleeding points on the left side of the nose. 

Care was taken to not cauterize the nose in direct opposition to the 

right-sided nasal cautery. The nasopharynx was unremarkable once the 

bleeding from the sphenopalatine artery was controlled. The patient was 

observed for approximately 10 to 15 minutes. No additional bleeding was 

seen. Some Merocel packing was then placed in both sides of the nose. There 

was no additional bleeding noted, and the patient was in satisfactory 

condition at the termination of the procedure. She was hemodynamically 

stable during the entire procedure.









DD:  11/01/2018 16:45

DT:  11/01/2018 17:14

Job#:  [id number]

## 2018-11-02 VITALS — DIASTOLIC BLOOD PRESSURE: 74 MMHG | SYSTOLIC BLOOD PRESSURE: 119 MMHG

## 2018-11-02 VITALS — DIASTOLIC BLOOD PRESSURE: 62 MMHG | SYSTOLIC BLOOD PRESSURE: 108 MMHG

## 2018-11-02 VITALS — DIASTOLIC BLOOD PRESSURE: 57 MMHG | SYSTOLIC BLOOD PRESSURE: 103 MMHG

## 2018-11-02 VITALS — SYSTOLIC BLOOD PRESSURE: 99 MMHG | DIASTOLIC BLOOD PRESSURE: 74 MMHG

## 2018-11-02 VITALS — SYSTOLIC BLOOD PRESSURE: 108 MMHG | DIASTOLIC BLOOD PRESSURE: 62 MMHG

## 2018-11-02 VITALS — DIASTOLIC BLOOD PRESSURE: 86 MMHG | SYSTOLIC BLOOD PRESSURE: 105 MMHG

## 2018-11-02 LAB
ANION GAP SERPL CALC-SCNC: 9.5 MMOL/L (ref 8–16)
BASOPHILS # BLD AUTO: 0 10*3/UL (ref 0–0.1)
BASOPHILS NFR BLD AUTO: 0.4 % (ref 0–1)
BUN SERPL-MCNC: 33 MG/DL (ref 7–26)
BUN/CREAT SERPL: 43 (ref 6–25)
CALCIUM SERPL-MCNC: 9.3 MG/DL (ref 8.4–10.2)
CHLORIDE SERPL-SCNC: 106 MMOL/L (ref 98–107)
CO2 SERPL-SCNC: 23 MMOL/L (ref 22–29)
DEPRECATED NEUTROPHILS # BLD AUTO: 8.3 10*3/UL (ref 2.1–6.9)
EGFRCR SERPLBLD CKD-EPI 2021: > 60 ML/MIN (ref 60–?)
EOSINOPHIL # BLD AUTO: 0.3 10*3/UL (ref 0–0.4)
EOSINOPHIL NFR BLD AUTO: 2.9 % (ref 0–6)
ERYTHROCYTE [DISTWIDTH] IN CORD BLOOD: 15.8 % (ref 11.7–14.4)
GLUCOSE SERPLBLD-MCNC: 140 MG/DL (ref 74–118)
HCT VFR BLD AUTO: 26.3 % (ref 34.2–44.1)
HCT VFR BLD AUTO: 26.7 % (ref 34.2–44.1)
HGB BLD-MCNC: 8.8 G/DL (ref 12–16)
HGB BLD-MCNC: 8.8 G/DL (ref 12–16)
LYMPHOCYTES # BLD: 1 10*3/UL (ref 1–3.2)
LYMPHOCYTES NFR BLD AUTO: 9.6 % (ref 18–39.1)
MCH RBC QN AUTO: 31.5 PG (ref 28–32)
MCHC RBC AUTO-ENTMCNC: 33 G/DL (ref 31–35)
MCV RBC AUTO: 95.7 FL (ref 81–99)
MONOCYTES # BLD AUTO: 1 10*3/UL (ref 0.2–0.8)
MONOCYTES NFR BLD AUTO: 9.1 % (ref 4.4–11.3)
NEUTS SEG NFR BLD AUTO: 77.3 % (ref 38.7–80)
PLATELET # BLD AUTO: 274 X10E3/UL (ref 140–360)
POTASSIUM SERPL-SCNC: 5.5 MMOL/L (ref 3.5–5.1)
RBC # BLD AUTO: 2.79 X10E6/UL (ref 3.6–5.1)
SODIUM SERPL-SCNC: 133 MMOL/L (ref 136–145)

## 2018-11-02 RX ADMIN — CASTOR OIL AND BALSAM, PERU SCH GM: 788; 87 OINTMENT TOPICAL at 21:22

## 2018-11-02 RX ADMIN — AMIODARONE HYDROCHLORIDE SCH MG: 200 TABLET ORAL at 07:53

## 2018-11-02 RX ADMIN — SODIUM CHLORIDE SCH MG: 900 INJECTION INTRAVENOUS at 21:20

## 2018-11-02 RX ADMIN — LIDOCAINE HYDROCHLORIDE SCH ML: 20 JELLY TOPICAL at 08:41

## 2018-11-02 RX ADMIN — METOPROLOL TARTRATE SCH MG: 25 TABLET, FILM COATED ORAL at 21:00

## 2018-11-02 RX ADMIN — AMIODARONE HYDROCHLORIDE SCH MG: 200 TABLET ORAL at 21:00

## 2018-11-02 RX ADMIN — SODIUM CHLORIDE PRN MG: 900 INJECTION INTRAVENOUS at 09:28

## 2018-11-02 RX ADMIN — METOPROLOL TARTRATE SCH MG: 25 TABLET, FILM COATED ORAL at 07:54

## 2018-11-02 RX ADMIN — Medication PRN MG: at 21:20

## 2018-11-02 RX ADMIN — LIDOCAINE HYDROCHLORIDE SCH ML: 20 JELLY TOPICAL at 15:37

## 2018-11-02 NOTE — PROGRESS NOTE
DATE:  November 02, 2018 



CARDIOLOGY PROGRESS NOTE 



SUBJECTIVE:  No major events overnight.  No further bleeding in the nose.  

She has packing in place.



OBJECTIVE   

VITAL SIGNS:  Temperature 98.4, pulse 85, respiratory rate 16, blood 

pressure 103/57, satting 96% on nasal cannula.  

GENERAL:  Elderly white female in no acute distress. 

CARDIOVASCULAR:  Regular rate and rhythm.  A 2/6 systolic murmur right 

upper sternal border radiating to the carotid. 

LUNGS:  Clear to auscultation bilaterally. 

ABDOMEN:  Soft, nontender, nondistended.  Normal bowel sounds. 

NEURO AND PSYCH:  Alert and oriented to person, place, and time.  Normal 

affect. 



CARDIOVASCULAR MEDICATIONS:  Reviewed.



TELEMETRY DATA:  Reviewed.  Shows normal sinus rhythm with occasional sinus 

tachycardia.



LABORATORY DATA:  Reviewed.



ASSESSMENT AND PLAN

1. Moderate aortic stenosis.

2. History of aortic valve endocarditis, status post intravenous 

antibiotics. 

3. History of atrial fibrillation, currently in sinus rhythm.  

4. 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 

anticoagulation.  

5. Recent small-bowel obstruction, status post laparoscopic surgery and 

lysis of adhesions.

6. Severe epistaxis requiring packing.



PLAN:  Continue current cardiovascular medications including metoprolol and 

amiodarone to maintain sinus rhythm.  No anticoagulation given ongoing 

epistaxis.  Will await clearance from ENT.  Otherwise, she is stable from a 

cardiovascular standpoint.  



Thank you for this consult.  Will continue to follow.







DD:  11/02/2018 12:19

DT:  11/02/2018 12:59

Job#:  [id number]

## 2018-11-03 VITALS — SYSTOLIC BLOOD PRESSURE: 115 MMHG | DIASTOLIC BLOOD PRESSURE: 88 MMHG

## 2018-11-03 VITALS — SYSTOLIC BLOOD PRESSURE: 99 MMHG | DIASTOLIC BLOOD PRESSURE: 42 MMHG

## 2018-11-03 VITALS — DIASTOLIC BLOOD PRESSURE: 66 MMHG | SYSTOLIC BLOOD PRESSURE: 122 MMHG

## 2018-11-03 VITALS — SYSTOLIC BLOOD PRESSURE: 115 MMHG | DIASTOLIC BLOOD PRESSURE: 74 MMHG

## 2018-11-03 VITALS — SYSTOLIC BLOOD PRESSURE: 122 MMHG | DIASTOLIC BLOOD PRESSURE: 66 MMHG

## 2018-11-03 VITALS — SYSTOLIC BLOOD PRESSURE: 114 MMHG | DIASTOLIC BLOOD PRESSURE: 75 MMHG

## 2018-11-03 VITALS — DIASTOLIC BLOOD PRESSURE: 61 MMHG | SYSTOLIC BLOOD PRESSURE: 97 MMHG

## 2018-11-03 VITALS — DIASTOLIC BLOOD PRESSURE: 63 MMHG | SYSTOLIC BLOOD PRESSURE: 107 MMHG

## 2018-11-03 VITALS — SYSTOLIC BLOOD PRESSURE: 84 MMHG | DIASTOLIC BLOOD PRESSURE: 77 MMHG

## 2018-11-03 VITALS — SYSTOLIC BLOOD PRESSURE: 107 MMHG | DIASTOLIC BLOOD PRESSURE: 63 MMHG

## 2018-11-03 VITALS — SYSTOLIC BLOOD PRESSURE: 104 MMHG | DIASTOLIC BLOOD PRESSURE: 74 MMHG

## 2018-11-03 VITALS — DIASTOLIC BLOOD PRESSURE: 52 MMHG | SYSTOLIC BLOOD PRESSURE: 88 MMHG

## 2018-11-03 VITALS — DIASTOLIC BLOOD PRESSURE: 59 MMHG | SYSTOLIC BLOOD PRESSURE: 103 MMHG

## 2018-11-03 VITALS — SYSTOLIC BLOOD PRESSURE: 115 MMHG | DIASTOLIC BLOOD PRESSURE: 54 MMHG

## 2018-11-03 VITALS — SYSTOLIC BLOOD PRESSURE: 109 MMHG | DIASTOLIC BLOOD PRESSURE: 64 MMHG

## 2018-11-03 LAB
ANION GAP SERPL CALC-SCNC: 11.1 MMOL/L (ref 8–16)
BASOPHILS # BLD AUTO: 0.1 10*3/UL (ref 0–0.1)
BASOPHILS NFR BLD AUTO: 0.6 % (ref 0–1)
BUN SERPL-MCNC: 27 MG/DL (ref 7–26)
BUN/CREAT SERPL: 33 (ref 6–25)
CALCIUM SERPL-MCNC: 9.8 MG/DL (ref 8.4–10.2)
CHLORIDE SERPL-SCNC: 107 MMOL/L (ref 98–107)
CO2 SERPL-SCNC: 23 MMOL/L (ref 22–29)
DEPRECATED NEUTROPHILS # BLD AUTO: 6.3 10*3/UL (ref 2.1–6.9)
EGFRCR SERPLBLD CKD-EPI 2021: > 60 ML/MIN (ref 60–?)
EOSINOPHIL # BLD AUTO: 0.5 10*3/UL (ref 0–0.4)
EOSINOPHIL NFR BLD AUTO: 5.5 % (ref 0–6)
ERYTHROCYTE [DISTWIDTH] IN CORD BLOOD: 15.6 % (ref 11.7–14.4)
GLUCOSE SERPLBLD-MCNC: 127 MG/DL (ref 74–118)
HCT VFR BLD AUTO: 27.9 % (ref 34.2–44.1)
HCT VFR BLD AUTO: 28.3 % (ref 34.2–44.1)
HGB BLD-MCNC: 9.1 G/DL (ref 12–16)
HGB BLD-MCNC: 9.2 G/DL (ref 12–16)
LYMPHOCYTES # BLD: 1.2 10*3/UL (ref 1–3.2)
LYMPHOCYTES NFR BLD AUTO: 12.9 % (ref 18–39.1)
MCH RBC QN AUTO: 32.1 PG (ref 28–32)
MCHC RBC AUTO-ENTMCNC: 32.5 G/DL (ref 31–35)
MCV RBC AUTO: 98.6 FL (ref 81–99)
MONOCYTES # BLD AUTO: 1 10*3/UL (ref 0.2–0.8)
MONOCYTES NFR BLD AUTO: 10.7 % (ref 4.4–11.3)
NEUTS SEG NFR BLD AUTO: 69.7 % (ref 38.7–80)
PLATELET # BLD AUTO: 313 X10E3/UL (ref 140–360)
POTASSIUM SERPL-SCNC: 5.1 MMOL/L (ref 3.5–5.1)
RBC # BLD AUTO: 2.87 X10E6/UL (ref 3.6–5.1)
SODIUM SERPL-SCNC: 136 MMOL/L (ref 136–145)

## 2018-11-03 RX ADMIN — Medication PRN MG: at 21:11

## 2018-11-03 RX ADMIN — AMIODARONE HYDROCHLORIDE SCH MG: 200 TABLET ORAL at 20:48

## 2018-11-03 RX ADMIN — METOPROLOL TARTRATE SCH MG: 25 TABLET, FILM COATED ORAL at 08:10

## 2018-11-03 RX ADMIN — SODIUM CHLORIDE SCH MG: 900 INJECTION INTRAVENOUS at 22:15

## 2018-11-03 RX ADMIN — LIDOCAINE HYDROCHLORIDE SCH ML: 20 JELLY TOPICAL at 15:44

## 2018-11-03 RX ADMIN — METOPROLOL TARTRATE SCH MG: 25 TABLET, FILM COATED ORAL at 20:48

## 2018-11-03 RX ADMIN — AMIODARONE HYDROCHLORIDE SCH MG: 200 TABLET ORAL at 08:09

## 2018-11-03 RX ADMIN — LIDOCAINE HYDROCHLORIDE SCH ML: 20 JELLY TOPICAL at 08:10

## 2018-11-03 RX ADMIN — CASTOR OIL AND BALSAM, PERU SCH GM: 788; 87 OINTMENT TOPICAL at 21:00

## 2018-11-03 NOTE — PROGRESS NOTE
DATE:  November 03, 2018 



CARDIOLOGY PROGRESS NOTE



SUBJECTIVE:  Patient is without any complaints this morning.  She does 

endorse some occasional abdominal pain, but otherwise no any other 

complaints.  Denies any chest pain, palpitations, or shortness of breath.



OBJECTIVE

VITAL SIGNS:  Temperature 98.2, pulse 84, respiratory rate 18, blood 

pressure 107/63, oxygen saturation 98% on room air.

GENERAL:  Alert and oriented x3, resting comfortably in bed, niece at the 

bedside.

NECK:  Supple.  No JVD noted.

CARDIOVASCULAR:  Regular rate and rhythm.  A 4/6 systolic murmur present.

LUNGS:  Clear to auscultation throughout.  No wheezing.  No rhonchi.

ABDOMEN:  Soft, nontender.  Midline abdominal suture noted.

EXTREMITIES:  Lower extremities, normal pulses.  No edema.



CARDIOVASCULAR MEDICATIONS

1. Metoprolol 12.5 mg p.o. q.12 hours.

2. Amiodarone 200 mg p.o. q.12 hours.



LABS:  WBC 8.96, hemoglobin 9.2, hematocrit 28.3, platelets 313.  Sodium 

136, potassium 5.1, BUN 27, creatinine 0.83, glucose 127, calcium 9.8.



TELEMETRY:  Sinus rhythm.



ASSESSMENT

1. Moderate aortic stenosis.

2. History of aortic valve endocarditis, status post intravenous 

antibiotics.

3. History of atrial fibrillation, currently remain in sinus rhythm.

4. History of deep vein thrombosis and pulmonary emboli, on chronic 

anticoagulation for this reason.

5. Recent small bowel obstruction, status post surgery.

6. Severe epistaxis requiring packing, packing remains in place.



PLAN:  Continue the above-listed cardiac medications.  Patient not a 

candidate for anticoagulation given epistaxis.  We will await clearance 

from ENT.  She remains stable from a cardiovascular standpoint.  We will 

continue to monitor this patient very closely.



Dictated by:  Chelsie Levine NP







DD:  11/03/2018 09:49

DT:  11/03/2018 10:30

Job#:  [id number] [facility]

## 2018-11-04 VITALS — SYSTOLIC BLOOD PRESSURE: 98 MMHG | DIASTOLIC BLOOD PRESSURE: 81 MMHG

## 2018-11-04 VITALS — SYSTOLIC BLOOD PRESSURE: 118 MMHG | DIASTOLIC BLOOD PRESSURE: 71 MMHG

## 2018-11-04 VITALS — DIASTOLIC BLOOD PRESSURE: 87 MMHG | SYSTOLIC BLOOD PRESSURE: 123 MMHG

## 2018-11-04 VITALS — DIASTOLIC BLOOD PRESSURE: 77 MMHG | SYSTOLIC BLOOD PRESSURE: 137 MMHG

## 2018-11-04 VITALS — DIASTOLIC BLOOD PRESSURE: 79 MMHG | SYSTOLIC BLOOD PRESSURE: 118 MMHG

## 2018-11-04 VITALS — SYSTOLIC BLOOD PRESSURE: 110 MMHG | DIASTOLIC BLOOD PRESSURE: 78 MMHG

## 2018-11-04 VITALS — DIASTOLIC BLOOD PRESSURE: 52 MMHG | SYSTOLIC BLOOD PRESSURE: 102 MMHG

## 2018-11-04 VITALS — SYSTOLIC BLOOD PRESSURE: 111 MMHG | DIASTOLIC BLOOD PRESSURE: 62 MMHG

## 2018-11-04 VITALS — SYSTOLIC BLOOD PRESSURE: 102 MMHG | DIASTOLIC BLOOD PRESSURE: 52 MMHG

## 2018-11-04 VITALS — DIASTOLIC BLOOD PRESSURE: 64 MMHG | SYSTOLIC BLOOD PRESSURE: 107 MMHG

## 2018-11-04 LAB
ANION GAP SERPL CALC-SCNC: 11.8 MMOL/L (ref 8–16)
BASOPHILS # BLD AUTO: 0 10*3/UL (ref 0–0.1)
BASOPHILS NFR BLD AUTO: 0.4 % (ref 0–1)
BUN SERPL-MCNC: 26 MG/DL (ref 7–26)
BUN/CREAT SERPL: 34 (ref 6–25)
CALCIUM SERPL-MCNC: 9.9 MG/DL (ref 8.4–10.2)
CHLORIDE SERPL-SCNC: 106 MMOL/L (ref 98–107)
CO2 SERPL-SCNC: 22 MMOL/L (ref 22–29)
DEPRECATED NEUTROPHILS # BLD AUTO: 6.6 10*3/UL (ref 2.1–6.9)
EGFRCR SERPLBLD CKD-EPI 2021: > 60 ML/MIN (ref 60–?)
EOSINOPHIL # BLD AUTO: 0.5 10*3/UL (ref 0–0.4)
EOSINOPHIL NFR BLD AUTO: 5.6 % (ref 0–6)
ERYTHROCYTE [DISTWIDTH] IN CORD BLOOD: 15.1 % (ref 11.7–14.4)
GLUCOSE SERPLBLD-MCNC: 119 MG/DL (ref 74–118)
HCT VFR BLD AUTO: 28.7 % (ref 34.2–44.1)
HGB BLD-MCNC: 9.4 G/DL (ref 12–16)
LYMPHOCYTES # BLD: 1 10*3/UL (ref 1–3.2)
LYMPHOCYTES NFR BLD AUTO: 10.8 % (ref 18–39.1)
MCH RBC QN AUTO: 31.6 PG (ref 28–32)
MCHC RBC AUTO-ENTMCNC: 32.8 G/DL (ref 31–35)
MCV RBC AUTO: 96.6 FL (ref 81–99)
MONOCYTES # BLD AUTO: 1 10*3/UL (ref 0.2–0.8)
MONOCYTES NFR BLD AUTO: 10.9 % (ref 4.4–11.3)
NEUTS SEG NFR BLD AUTO: 71.8 % (ref 38.7–80)
PLATELET # BLD AUTO: 356 X10E3/UL (ref 140–360)
POTASSIUM SERPL-SCNC: 4.8 MMOL/L (ref 3.5–5.1)
RBC # BLD AUTO: 2.97 X10E6/UL (ref 3.6–5.1)
SODIUM SERPL-SCNC: 135 MMOL/L (ref 136–145)

## 2018-11-04 RX ADMIN — AMIODARONE HYDROCHLORIDE SCH MG: 200 TABLET ORAL at 21:00

## 2018-11-04 RX ADMIN — CASTOR OIL AND BALSAM, PERU SCH GM: 788; 87 OINTMENT TOPICAL at 21:24

## 2018-11-04 RX ADMIN — METOPROLOL TARTRATE SCH MG: 25 TABLET, FILM COATED ORAL at 21:00

## 2018-11-04 RX ADMIN — SODIUM CHLORIDE SCH MG: 900 INJECTION INTRAVENOUS at 21:24

## 2018-11-04 RX ADMIN — Medication PRN MG: at 22:30

## 2018-11-04 RX ADMIN — Medication PRN GM: at 11:49

## 2018-11-04 RX ADMIN — METOPROLOL TARTRATE SCH MG: 25 TABLET, FILM COATED ORAL at 08:06

## 2018-11-04 RX ADMIN — LIDOCAINE HYDROCHLORIDE SCH ML: 20 JELLY TOPICAL at 16:21

## 2018-11-04 RX ADMIN — AMIODARONE HYDROCHLORIDE SCH MG: 200 TABLET ORAL at 08:06

## 2018-11-04 RX ADMIN — LIDOCAINE HYDROCHLORIDE SCH ML: 20 JELLY TOPICAL at 08:06

## 2018-11-04 NOTE — PROGRESS NOTE
DATE:  November 04, 2018 



CARDIOLOGY PROGRESS NOTE



SUBJECTIVE:  Patient is without any new complaints.  She does report that 

she was not able to rest most of the night; however, denies any chest pain, 

shortness of breath, or palpitations.



CARDIOVASCULAR MEDICATIONS

1. Metoprolol 2.5 mg q.6 h p.r.n. IV.

2. Metoprolol 12.5 mg q.12 hours p.o.

3. Amiodarone 200 mg q.12 hours p.o.



LABS:  WBC 9.17, hemoglobin 9.4, hematocrit 28.7, and platelets 356.  

Sodium 135, potassium 4.8, BUN 26, creatinine 0.77, glucose 119, calcium 

9.9.



TELEMETRY:  Sinus rhythm.



OBJECTIVE

VITAL SIGNS:  Temperature 98.2, pulse 85, respiratory rate 23, blood 

pressure 98/81, and oxygen saturation 98% on room air.

GENERAL:  Alert and oriented x3, resting comfortably in bed.  Friend at the 

bedside.

LUNGS:  Clear to auscultation throughout.  No wheezing.  No rhonchi or 

crackles.

NECK:  Supple.  No JVD noted.

CARDIOVASCULAR:  Regular rate and rhythm.  A 4/6 systolic murmur present.

ABDOMEN:  Soft, nontender.  Midline abdominal sutures present.

EXTREMITIES:  Lower extremities; no edema, 2+ pedal pulse.



ASSESSMENT

1. Moderate aortic stenosis.

2. History of aortic valve endocarditis, status post intravenous 

antibiotic.

3. History of atrial fibrillation, currently remains to be in sinus 

rhythm with amiodarone.

4. History of deep vein thrombosis with pulmonary emboli, on chronic 

anticoagulation for this reason.

5. Recent small bowel obstruction, status post surgery.

6. Severe epistaxis requiring packing, packing still remains in place.



PLAN:  Packing is to be removed by ENT tomorrow.  Continue the above 

cardiac medications.  Patient convinced that the plan is to be discharged 

to a rehab possibly tomorrow.  From a cardiovascular standpoint, this 

patient remains to be stable.  We will continue to follow very closely.



Dictated by:  Chelsie Levine NP







DD:  11/04/2018 10:34

DT:  11/04/2018 10:51

Job#:  [id number] CHRISTINA

## 2018-11-05 VITALS — DIASTOLIC BLOOD PRESSURE: 87 MMHG | SYSTOLIC BLOOD PRESSURE: 119 MMHG

## 2018-11-05 VITALS — SYSTOLIC BLOOD PRESSURE: 117 MMHG | DIASTOLIC BLOOD PRESSURE: 76 MMHG

## 2018-11-05 VITALS — DIASTOLIC BLOOD PRESSURE: 75 MMHG | SYSTOLIC BLOOD PRESSURE: 125 MMHG

## 2018-11-05 VITALS — SYSTOLIC BLOOD PRESSURE: 119 MMHG | DIASTOLIC BLOOD PRESSURE: 87 MMHG

## 2018-11-05 VITALS — DIASTOLIC BLOOD PRESSURE: 86 MMHG | SYSTOLIC BLOOD PRESSURE: 102 MMHG

## 2018-11-05 VITALS — SYSTOLIC BLOOD PRESSURE: 111 MMHG | DIASTOLIC BLOOD PRESSURE: 65 MMHG

## 2018-11-05 VITALS — SYSTOLIC BLOOD PRESSURE: 123 MMHG | DIASTOLIC BLOOD PRESSURE: 76 MMHG

## 2018-11-05 VITALS — SYSTOLIC BLOOD PRESSURE: 122 MMHG | DIASTOLIC BLOOD PRESSURE: 88 MMHG

## 2018-11-05 LAB
ALBUMIN SERPL-MCNC: 2.6 G/DL (ref 3.5–5)
ALBUMIN/GLOB SERPL: 0.8 {RATIO} (ref 0.8–2)
ALP SERPL-CCNC: 94 IU/L (ref 40–150)
ALT SERPL-CCNC: 20 IU/L (ref 0–55)
ANION GAP SERPL CALC-SCNC: 11.1 MMOL/L (ref 8–16)
BASOPHILS # BLD AUTO: 0 10*3/UL (ref 0–0.1)
BASOPHILS NFR BLD AUTO: 0.4 % (ref 0–1)
BUN SERPL-MCNC: 30 MG/DL (ref 7–26)
BUN/CREAT SERPL: 38 (ref 6–25)
CALCIUM SERPL-MCNC: 10.2 MG/DL (ref 8.4–10.2)
CHLORIDE SERPL-SCNC: 105 MMOL/L (ref 98–107)
CO2 SERPL-SCNC: 23 MMOL/L (ref 22–29)
DEPRECATED NEUTROPHILS # BLD AUTO: 8 10*3/UL (ref 2.1–6.9)
EGFRCR SERPLBLD CKD-EPI 2021: > 60 ML/MIN (ref 60–?)
EOSINOPHIL # BLD AUTO: 0.3 10*3/UL (ref 0–0.4)
EOSINOPHIL NFR BLD AUTO: 2.9 % (ref 0–6)
ERYTHROCYTE [DISTWIDTH] IN CORD BLOOD: 14.7 % (ref 11.7–14.4)
GLOBULIN PLAS-MCNC: 3.3 G/DL (ref 2.3–3.5)
GLUCOSE SERPLBLD-MCNC: 131 MG/DL (ref 74–118)
HCT VFR BLD AUTO: 31.4 % (ref 34.2–44.1)
HGB BLD-MCNC: 10.3 G/DL (ref 12–16)
LYMPHOCYTES # BLD: 1 10*3/UL (ref 1–3.2)
LYMPHOCYTES NFR BLD AUTO: 9.2 % (ref 18–39.1)
MAGNESIUM SERPL-MCNC: 2 MG/DL (ref 1.3–2.1)
MCH RBC QN AUTO: 32 PG (ref 28–32)
MCHC RBC AUTO-ENTMCNC: 32.8 G/DL (ref 31–35)
MCV RBC AUTO: 97.5 FL (ref 81–99)
MONOCYTES # BLD AUTO: 1.1 10*3/UL (ref 0.2–0.8)
MONOCYTES NFR BLD AUTO: 10.6 % (ref 4.4–11.3)
NEUTS SEG NFR BLD AUTO: 76.2 % (ref 38.7–80)
PLATELET # BLD AUTO: 439 X10E3/UL (ref 140–360)
POTASSIUM SERPL-SCNC: 5.1 MMOL/L (ref 3.5–5.1)
RBC # BLD AUTO: 3.22 X10E6/UL (ref 3.6–5.1)
SODIUM SERPL-SCNC: 134 MMOL/L (ref 136–145)
TRIGL SERPL-MCNC: 64 MG/DL (ref 0–149)

## 2018-11-05 RX ADMIN — AMIODARONE HYDROCHLORIDE SCH MG: 200 TABLET ORAL at 08:43

## 2018-11-05 RX ADMIN — BACITRACIN ZINC, NEOMYCIN, POLYMYXIN B SCH GM: 400; 3.5; 5 OINTMENT TOPICAL at 15:13

## 2018-11-05 RX ADMIN — LIDOCAINE HYDROCHLORIDE SCH ML: 20 JELLY TOPICAL at 16:08

## 2018-11-05 RX ADMIN — BACITRACIN ZINC, NEOMYCIN, POLYMYXIN B SCH GM: 400; 3.5; 5 OINTMENT TOPICAL at 08:44

## 2018-11-05 RX ADMIN — SALINE NASAL SPRAY SCH SPRAY: 1.5 SOLUTION NASAL at 08:43

## 2018-11-05 RX ADMIN — METOPROLOL TARTRATE SCH MG: 25 TABLET, FILM COATED ORAL at 08:43

## 2018-11-05 RX ADMIN — SALINE NASAL SPRAY SCH SPRAY: 1.5 SOLUTION NASAL at 15:13

## 2018-11-05 RX ADMIN — LIDOCAINE HYDROCHLORIDE SCH ML: 20 JELLY TOPICAL at 08:44

## 2018-11-05 NOTE — PROGRESS NOTE
DATE:  November 05, 2018 



CARDIOLOGY PROGRESS NOTE



SUBJECTIVE:  Patient was seen and examined sitting in bed.  She reports no 

events overnight.  She denies any chest pain, shortness of breath, or 

palpitations.  There is no abdominal discomfort.  She states she is 

undergoing a swallow evaluation.  



OBJECTIVE 

VITAL SIGNS:  Temperature is 98.7, heart rate is 82, respirations are 17, 

oxygen saturation 97% on room air, blood pressure is 122/88.

GENERAL:  She is a thin elderly woman lying comfortably in bed alert and 

oriented times 3.  No apparent distress.  

HEENT:  Head is normocephalic and atraumatic.  Eyes:  Extraocular movements 

are intact.  Conjunctivae are clear.  

NECK:  No jugular venous distention.  

CARDIOVASCULAR:  Regular rate and rhythm.  There is a 3/6 systolic murmur 

heard best at the right upper sternal border.  

LUNGS:  Mildly decreased breath sounds at bilateral bases.  No wheezing.  

No rales.

ABDOMEN:  Soft, nontender and nondistended.

EXTREMITIES:  No edema.

VASCULAR:  Two plus pulses.

NEUROLOGICAL:  No focal deficits noted.  



CARDIOVASCULAR MEDICATIONS 

1.  Metoprolol tartrate p.r.n. 

2.  Amiodarone 200 mg p.o. b.i.d. 



LABORATORY DATA:  Reviewed.



IMAGING DATA:  Reviewed.



IMPRESSION 

1.  Moderate aortic stenosis.

2.  History of aortic valve endocarditis:  Status post antibiotics.

3.  History of paroxysmal atrial fibrillation. 

4.  History of deep venous thrombosis and pulmonary embolism.

5.  Small-bowel obstruction:  Status post surgery.

6.  History of epistaxis.



RECOMMENDATIONS:  The patient is stable from a cardiovascular standpoint.  

She remains in normal sinus rhythm on telemetry.  Her anticoagulation is 

being held due to epistaxis.  She is progressing well from her small-bowel 

obstruction and undergoing swallow evaluation soon.  Will continue to 

monitor aortic stenosis as an outpatient.  She is otherwise stable from a 

cardiovascular standpoint. 









DD:  11/05/2018 14:01

DT:  11/05/2018 14:03

Job#:  [id number] RI

## 2018-11-05 NOTE — DIAGNOSTIC IMAGING REPORT
Examination: Single AP view of the chest.



COMPARISON: None.



INDICATION: Evaluate for pneumonia

     

DISCUSSION:



Lines/tubes:  Right PICC line with tip overlying the superior vena cava.



Lungs:  Lungs are hyperinflated. No consolidative pneumonia.



Pleura:  There is no pleural effusion or pneumothorax.



Heart and mediastinum:  The heart and the mediastinum are unremarkable.



Bones and soft tissues:  No acute bony abnormalities.  



IMPRESSION:

 

1.   No acute cardiopulmonary abnormalities.



Signed by: Dr. Andrew Palisch, M.D. on 11/5/2018 11:34 AM

## 2019-08-05 ENCOUNTER — HOSPITAL ENCOUNTER (OUTPATIENT)
Dept: HOSPITAL 88 - RAD | Age: 84
End: 2019-08-05
Attending: INTERNAL MEDICINE
Payer: MEDICARE

## 2019-08-05 DIAGNOSIS — M25.512: Primary | ICD-10-CM

## 2019-08-05 NOTE — DIAGNOSTIC IMAGING REPORT
EXAMINATION:  SHOULDER LEFT COMPLETE    



INDICATION: Left shoulder pain



COMPARISON: None

     

FINDINGS:



2 views of the left shoulder and internal and external rotation demonstrate no

acute fracture or dislocation. Severe degenerative changes of the glenohumeral

joint with bone-on-bone contact, bulky osteophyte formation, subchondral

sclerosis and subchondral cystic change. The visualized portions of the left

lung are clear. No displaced rib fracture.



IMPRESSION: 

No acute osseous injury. Severe left glenohumeral joint degenerative changes.



Signed by: Edda Hicks MD on 8/5/2019 1:27 PM

## 2019-09-07 ENCOUNTER — HOSPITAL ENCOUNTER (INPATIENT)
Dept: HOSPITAL 88 - ER | Age: 84
LOS: 2 days | Discharge: HOME | DRG: 684 | End: 2019-09-09
Attending: INTERNAL MEDICINE | Admitting: INTERNAL MEDICINE
Payer: MEDICARE

## 2019-09-07 VITALS — DIASTOLIC BLOOD PRESSURE: 88 MMHG | SYSTOLIC BLOOD PRESSURE: 181 MMHG

## 2019-09-07 VITALS — SYSTOLIC BLOOD PRESSURE: 153 MMHG | DIASTOLIC BLOOD PRESSURE: 89 MMHG

## 2019-09-07 VITALS — SYSTOLIC BLOOD PRESSURE: 174 MMHG | DIASTOLIC BLOOD PRESSURE: 87 MMHG

## 2019-09-07 VITALS — BODY MASS INDEX: 20.09 KG/M2 | HEIGHT: 67 IN | WEIGHT: 128 LBS

## 2019-09-07 VITALS — SYSTOLIC BLOOD PRESSURE: 181 MMHG | DIASTOLIC BLOOD PRESSURE: 88 MMHG

## 2019-09-07 VITALS — DIASTOLIC BLOOD PRESSURE: 67 MMHG | SYSTOLIC BLOOD PRESSURE: 115 MMHG

## 2019-09-07 VITALS — DIASTOLIC BLOOD PRESSURE: 87 MMHG | SYSTOLIC BLOOD PRESSURE: 174 MMHG

## 2019-09-07 DIAGNOSIS — Z79.01: ICD-10-CM

## 2019-09-07 DIAGNOSIS — N17.9: Primary | ICD-10-CM

## 2019-09-07 DIAGNOSIS — D64.9: ICD-10-CM

## 2019-09-07 DIAGNOSIS — Z86.718: ICD-10-CM

## 2019-09-07 DIAGNOSIS — E83.52: ICD-10-CM

## 2019-09-07 DIAGNOSIS — I12.9: ICD-10-CM

## 2019-09-07 DIAGNOSIS — I48.91: ICD-10-CM

## 2019-09-07 DIAGNOSIS — J43.9: ICD-10-CM

## 2019-09-07 DIAGNOSIS — Z86.711: ICD-10-CM

## 2019-09-07 DIAGNOSIS — E78.5: ICD-10-CM

## 2019-09-07 DIAGNOSIS — N18.3: ICD-10-CM

## 2019-09-07 DIAGNOSIS — R07.89: ICD-10-CM

## 2019-09-07 DIAGNOSIS — I35.0: ICD-10-CM

## 2019-09-07 LAB
ALBUMIN SERPL-MCNC: 4.1 G/DL (ref 3.5–5)
ALBUMIN/GLOB SERPL: 1.4 {RATIO} (ref 0.8–2)
ALP SERPL-CCNC: 80 IU/L (ref 40–150)
ALT SERPL-CCNC: 7 IU/L (ref 0–55)
ANION GAP SERPL CALC-SCNC: 16.4 MMOL/L (ref 8–16)
BACTERIA URNS QL MICRO: (no result) /HPF
BASOPHILS # BLD AUTO: 0.1 10*3/UL (ref 0–0.1)
BASOPHILS NFR BLD AUTO: 0.7 % (ref 0–1)
BILIRUB UR QL: NEGATIVE
BUN SERPL-MCNC: 24 MG/DL (ref 7–26)
BUN/CREAT SERPL: 16 (ref 6–25)
CALCIUM SERPL-MCNC: 10.9 MG/DL (ref 8.4–10.2)
CHLORIDE SERPL-SCNC: 102 MMOL/L (ref 98–107)
CK MB SERPL-MCNC: 0.8 NG/ML (ref 0–5)
CK MB SERPL-MCNC: 0.8 NG/ML (ref 0–5)
CK SERPL-CCNC: 222 IU/L (ref 29–168)
CK SERPL-CCNC: 233 IU/L (ref 29–168)
CLARITY UR: CLEAR
CO2 SERPL-SCNC: 24 MMOL/L (ref 22–29)
COLOR UR: YELLOW
DEPRECATED APTT PLAS QN: 34.1 SECONDS (ref 23.8–35.5)
DEPRECATED INR PLAS: 0.98
DEPRECATED NEUTROPHILS # BLD AUTO: 6.8 10*3/UL (ref 2.1–6.9)
DEPRECATED RBC URNS MANUAL-ACNC: (no result) /HPF (ref 0–5)
EGFRCR SERPLBLD CKD-EPI 2021: 34 ML/MIN (ref 60–?)
EOSINOPHIL # BLD AUTO: 0.1 10*3/UL (ref 0–0.4)
EOSINOPHIL NFR BLD AUTO: 0.7 % (ref 0–6)
EPI CELLS URNS QL MICRO: (no result) /LPF
ERYTHROCYTE [DISTWIDTH] IN CORD BLOOD: 13.3 % (ref 11.7–14.4)
GLOBULIN PLAS-MCNC: 2.9 G/DL (ref 2.3–3.5)
GLUCOSE SERPLBLD-MCNC: 170 MG/DL (ref 74–118)
HCT VFR BLD AUTO: 37.9 % (ref 34.2–44.1)
HGB BLD-MCNC: 12.6 G/DL (ref 12–16)
KETONES UR QL STRIP.AUTO: NEGATIVE
LEUKOCYTE ESTERASE UR QL STRIP.AUTO: NEGATIVE
LYMPHOCYTES # BLD: 1.3 10*3/UL (ref 1–3.2)
LYMPHOCYTES NFR BLD AUTO: 14.9 % (ref 18–39.1)
MCH RBC QN AUTO: 32.8 PG (ref 28–32)
MCHC RBC AUTO-ENTMCNC: 33.2 G/DL (ref 31–35)
MCV RBC AUTO: 98.7 FL (ref 81–99)
MONOCYTES # BLD AUTO: 0.6 10*3/UL (ref 0.2–0.8)
MONOCYTES NFR BLD AUTO: 6.7 % (ref 4.4–11.3)
NEUTS SEG NFR BLD AUTO: 76.8 % (ref 38.7–80)
NITRITE UR QL STRIP.AUTO: NEGATIVE
PLATELET # BLD AUTO: 297 X10E3/UL (ref 140–360)
POTASSIUM SERPL-SCNC: 4.4 MMOL/L (ref 3.5–5.1)
PROT UR QL STRIP.AUTO: NEGATIVE
PROTHROMBIN TIME: 13.5 SECONDS (ref 11.9–14.5)
RBC # BLD AUTO: 3.84 X10E6/UL (ref 3.6–5.1)
SODIUM SERPL-SCNC: 138 MMOL/L (ref 136–145)
SP GR UR STRIP: 1.01 (ref 1.01–1.02)
UROBILINOGEN UR STRIP-MCNC: 0.2 MG/DL (ref 0.2–1)
WBC #/AREA URNS HPF: (no result) /HPF (ref 0–5)

## 2019-09-07 PROCEDURE — 82570 ASSAY OF URINE CREATININE: CPT

## 2019-09-07 PROCEDURE — 82550 ASSAY OF CK (CPK): CPT

## 2019-09-07 PROCEDURE — 99284 EMERGENCY DEPT VISIT MOD MDM: CPT

## 2019-09-07 PROCEDURE — 84300 ASSAY OF URINE SODIUM: CPT

## 2019-09-07 PROCEDURE — 84166 PROTEIN E-PHORESIS/URINE/CSF: CPT

## 2019-09-07 PROCEDURE — 97139 UNLISTED THERAPEUTIC PX: CPT

## 2019-09-07 PROCEDURE — 71045 X-RAY EXAM CHEST 1 VIEW: CPT

## 2019-09-07 PROCEDURE — 84484 ASSAY OF TROPONIN QUANT: CPT

## 2019-09-07 PROCEDURE — 76770 US EXAM ABDO BACK WALL COMP: CPT

## 2019-09-07 PROCEDURE — 80061 LIPID PANEL: CPT

## 2019-09-07 PROCEDURE — 36415 COLL VENOUS BLD VENIPUNCTURE: CPT

## 2019-09-07 PROCEDURE — 82553 CREATINE MB FRACTION: CPT

## 2019-09-07 PROCEDURE — 80048 BASIC METABOLIC PNL TOTAL CA: CPT

## 2019-09-07 PROCEDURE — 85025 COMPLETE CBC W/AUTO DIFF WBC: CPT

## 2019-09-07 PROCEDURE — 93306 TTE W/DOPPLER COMPLETE: CPT

## 2019-09-07 PROCEDURE — 83880 ASSAY OF NATRIURETIC PEPTIDE: CPT

## 2019-09-07 PROCEDURE — 93005 ELECTROCARDIOGRAM TRACING: CPT

## 2019-09-07 PROCEDURE — 85610 PROTHROMBIN TIME: CPT

## 2019-09-07 PROCEDURE — 81015 MICROSCOPIC EXAM OF URINE: CPT

## 2019-09-07 PROCEDURE — 80053 COMPREHEN METABOLIC PANEL: CPT

## 2019-09-07 PROCEDURE — 81001 URINALYSIS AUTO W/SCOPE: CPT

## 2019-09-07 PROCEDURE — 84165 PROTEIN E-PHORESIS SERUM: CPT

## 2019-09-07 PROCEDURE — 85730 THROMBOPLASTIN TIME PARTIAL: CPT

## 2019-09-07 RX ADMIN — SODIUM CHLORIDE SCH MLS/HR: 9 INJECTION, SOLUTION INTRAVENOUS at 21:40

## 2019-09-07 RX ADMIN — FAMOTIDINE SCH MG: 10 INJECTION, SOLUTION INTRAVENOUS at 16:30

## 2019-09-07 NOTE — XMS REPORT
Summary of Care

                             Created on: 2019



Bev Arredondo

External Reference #: [id number]

: 1932

Sex: Female



Demographics







                          Address                   [address] [place]  [address]

 

                          Home Phone                [phone]

 

                          Preferred Language        English

 

                          Marital Status            

 

                          Shinto Affiliation     PRO

 

                          Race                      White

 

                          Ethnic Group              Non-





Author







                          Author                    [facility]

 

                          Organization              [facility]

 

                          Address                   Unknown

 

                          Phone                     Unavailable







Support







                Name            Relationship    Address         Phone

 

                Sarah Francis    [employer]            Unknown         [phone]

 

                Drea Pradhan    [employer]            Unknown         [phone]







Care Team Providers







                    Care Team Member Name    Role                Phone

 

                    Marni Fragoso    PCP                 [phone]







Encounter Details







                          Care Team                 Description



                     Date                Type                Department  

 

                                        



[facility]ass[facility] [place]



[address] [place] [address]                      



                     2019          Orders Only         [facility]  



                                         [address] [place] [address]  







Allergies







                                        Comments



                 Active Allergy     Reactions       Severity        Noted Date 

 

                                        



Very sleepy



                     Codeine             Other - See         2019 



                                         comments   



documented as of this encounter (statuses as of 2019)



Medications







                          End Date                  Status



              Medication     Sig          Dispensed     Refills      Start  



                                         Date  

 

                                                    Active



                     apixaban 2.5 mg tablet     Take 2.5 mg         0   



                                         by mouth 2     



                                         (two) times     



                                         daily.     

 

                                                    Active



                     OMEPRAZOLE ORAL     Take 40 mg by       0   



                                         mouth daily.     

 

                                                    Active



                     losartan 25 mg tablet     Take 25 mg by       0   



                                         mouth daily.     

 

                                                    Active



                     metoprolol succinate XL     Take 25 mg by       0   



                           25 mg 24 hr tablet        mouth daily.     



documented as of this encounter (statuses as of 2019)



Active Problems





Not on filedocumented as of this encounter (statuses as of 2019)



Social History







                                        Date



                 Tobacco Use     Types           Packs/Day       Years Used 

 

                                         



                                         Never Assessed    









 



                           Sex Assigned at Birth     Date Recorded

 

 



                                         Not on file 









                                        Industry



                           Job Start Date            Occupation 

 

                                        Not on file



                           Not on file               Not on file 









                                        Travel End



                           Travel History            Travel Start 

 





                                         No recent travel history available.



documented as of this encounter



Last Filed Vital Signs

Not on filedocumented in this encounter



Plan of Treatment







   



                 Health Maintenance     Due Date        Last Done       Comments

 

   



                           DTaP,Tdap,and Td Vaccines     10/02/1951  



                                         (1 - Tdap)   

 

   



                           Zoster Recombinant        10/02/1982  



                                         Vaccine (SHINGRIX) (1 of   



                                         2)   

 

   



                           Medicare Wellness Visit     10/02/1997  

 

   



                           Osteoporosis Screening     10/02/1997  

 

   



                           PNEUMOCOCCAL VACCINES 65+     10/02/1997  



                                         (1 of 2 - PCV13)   

 

   



                           INFLUENZA VACCINE         2019  



documented as of this encounter



Procedures







                                        Comments



                 Procedure Name     Priority        Date/Time       Associated Diagnosis 

 

                                         



                     EXTERNAL PROVIDER RECORDS     Routine             2019  



                                         12:01 AM CDT  



documented in this encounter



Results

Not on filedocumented in this encounter



Insurance







                                        Type



            Payer      Benefit     Subscriber ID     Effective     Phone      Address 



                           Plan /                    Dates   



                                         Group     

 

                                        Medicare



            MEDICARE     MEDICARE     xxxxxxxxxx     10/1/1997-     [phone]     [address] 



                     [address]          Present             [id number] 



                                         [place], 



                                         [place] 



                                         [id number] 

 

                                        PPO/POS



            BCBS [facility]     BCBS FED     [id number]     2002-P     [phone]     [address] 



                     [address]              resent              [id number] 



                                         [place] 



                                         [id number] 



documented as of this encounter

## 2019-09-07 NOTE — XMS REPORT
Continuity of Care Document

                             Created on: 2019



EDIN CHIANG

External Reference #: [id number]

: 1932

Sex: Female



Demographics







                          Address                   [facility] [place]  [address]

 

                          Home Phone                [phone]

 

                          Preferred Language        English

 

                          Marital Status            Unknown

 

                          Catholic Affiliation     Unknown

 

                          Race                      Unknown

 

                          Ethnic Group              Unknown





Author







                          Author                    [facility]

 

                          Organization              [facility]

 

                          Address                   Unknown

 

                          Phone                     Unavailable







Care Team Providers







                    Care Team Member Name    Role                Phone

 

                    [employer]    Unavailable         Unavailable



                                    



Problems

                    





                    Problem                            Status                            Onset Date     

                          Classification                            Date Reported       

                          Comments                            Source                    

 

                    Febrile illness                            Active                            2016

                            Problem                            2018            

                                                      [facility]  

                  

 

                    Fecal impaction                            Active                            2016

                            Problem                            2018            

                                                      [facility]  

                  

 

                    Incomplete RBBB                            Active                            2016

                            Problem                            2018            

                                                      [facility]  

                  

 

                          ST segment changes on electrocardiogram                            Active         

                          2016                            Problem                     

                    2018                                                        [facility]                    



                                                                                
                                       



Medications

                    





                    Medication                            Details                            Route      

                          Status                            Patient Instructions         

                          Ordering Provider                            Order Date           

                                        Source                    

 

                                        Progesterone,Micronized (Prometrium) 100 Mg Capsule, 100 Mg Oral                

                    Daily                                                        Active        

                                                                            2015 

                                        [facility]                 

   

 

                                        Risedronate Sodium (Actonel) 150 Mg Tablet, 150 Mg Oral                         

                    Monthly                                                        Active               

                                                                            2015        

                                        [facility]                    

 

                          Hyoscyamine (Levsin) 0.125 Mg Tab, 0.125 Mg Sublingual                            

As Needed as needed                                                        Active    

                                                                                2015

                                        [facility]                

    

 

                          Ferrous Sulfate (Iron) 27 Mg Tablet, 27 Mg Oral                                   

                                                 Active                                

                                                      2014                       

                                        [facility]                    

 

                                        Gluc 2KCL/Chondr/Kayode Hy/Hy Ac (Glucosamine & Chondroitin Cap) 1 Each Capsule, 1

 Each Oral                            Daily                                          

                    Active                                                                   

                          2014                            [facility]                    

 

                          Naproxen 500 Mg Tablet, 500 Mg Oral                            2DAILY             

                                                Active                                      

                                                2014                            [facility]                    

 

                          Ascorbic Acid (Vitamin C) 500 Mg Capsule.sa                            Daily      

                                                  Active                               

                                                                                 [facility]                    

 

                    Biotin 1000MCG                            Daily                                     

                    Active                                                              

                                                      [facility]

                    

 

                                        Calcium Carbonate/Vitamin D3 (Caltrate 600 + D Chewable Tab) 1 Each Tab.chew    

                          Twice A Day                                                  

                    Active                                                                           

                                                      [facility]         

           

 

                                        Cholecalciferol (Vitamin D3) (Vitamin D3) 2,000 Unit Tablet                     

                    Daily                                                        [facility]                    

 

                                        Cyanocobalamin/Folic Acid (Vitamin B12-Folic Acid Tablet) 1 Each Tablet         

                    Daily                                                        [facility]                    



 

                          Esomeprazole Mag Trihydrate (Nexium) 40 Mg Capsule.                             

                                                       Active                        

                                                                                                    [facility]                    

 

                    Meloxicam 15 Mg Tablet                            Daily                             

                           [facility]                    

 

                          Risedronate Sodium (Atelvia) 35 Mg Tablet.dr                            Weekly    

                                                    Active                             

                                                                                   [facility]                    

 

                    Vitamin E 400IU Qd                            Daily                                 

                       [facility]                    



                                                                                
                                                                                
                                                                                
                                                                 



Allergies, Adverse Reactions, Alerts

        





                                        No Known Medication Allergies                      



                                                        



Immunizations

        





                                        No Data Provided for This Section



                                    



Results







                    Order Name                            Results                            Value      

                          Reference Range                            Date                

                          Interpretation                            Comments                       

                                        Source                    

 

                                        Estimated glomerular filtration rate (GFR) determination                        

                          Estimated glomerular filtration rate (GFR) determination    45                   

                    60                            2017                                      

                                                      [facility]

                    

 

                          Serum or plasma creatinine measurement (mass/volume)                            Serum

 or plasma creatinine measurement (mass/volume)    1.14                            0.57

 - 1.11                            2017                                        

                                                      [facility]  

                  

 

                                        Serum or plasma urea nitrogen measurement (mass/volume)                         

                          Serum or plasma urea nitrogen measurement (mass/volume)    17                     

                    7 - 26                            2017                                    

                                                      [facility]

                    

 

                          Serum or plasma urea nitrogen/creatinine mass ratio                            Serum

 or plasma urea nitrogen/creatinine mass ratio    15                            6 - 25

                            2017                                               

                                                      [facility]         

           

 

                          Automated blood basophil count (count/volume)                            Automated

 blood basophil count (count/volume)    0.1                            0.0 - 0.1     

                          2017                                                    

                                                      [facility]              

      

 

                                        Automated blood basophil count as percentage of total leukocytes                

                          Automated blood basophil count as percentage of total leukocytes    0.9  

                          0.0 - 1.0                            2017            

                                                                            [facility]                    

 

                          Automated blood eosinophil count                            Automated blood eosinophil

 count              0.1                            0.0 - 0.4                            2017

                                                                                    [facility]                    

 

                                        Automated blood eosinophil count as percentage of total leukocytes              

                          Automated blood eosinophil count as percentage of total leukocytes    2.5

                            0.0 - 6.0                            2017          

                                                                            [facility]                    

 

                          Automated blood hematocrit (volume fraction)                            Automated 

blood hematocrit (volume fraction)    38.7                            34.2 - 44.1    

                          2017                                                   

                                                      [facility]             

       

 

                                        Automated blood lymphocyte count as percentage ot total leukocytes              

                          Automated blood lymphocyte count as percentage ot total leukocytes    26.9

                            18.0 - 39.1                            2017        

                                                                            [facility]                    

 

                                        Automated blood monocyte count as percentage of total leukocytes                

                          Automated blood monocyte count as percentage of total leukocytes    13.2 

                           4.4 - 11.3                            2017          

                                                                            [facility]                    

 

                          Automated blood neutrophil count                            Automated blood neutrophil

 count              3.2                            2.1 - 6.9                            2017

                                                                                    [facility]                    

 

                          Automated blood platelet count (count/volume)                            Automated

 blood platelet count (count/volume)    250                            140 - 360     

                          2017                                                    

                                                      [facility]              

      

 

                                        Automated blood segmented neutrophil count as percentage of total leukocytes    

                                        Automated blood segmented neutrophil count as percentage of

 total leukocytes         56.3                            38.7 - 80.0                     

                    2017                                                                      

                                        [facility]                    

 

                                        Automated erythrocyte mean corpuscular hemoglobin (mass per erythrocyte)        

                                        Automated erythrocyte mean corpuscular hemoglobin (mass per erythrocyte)

                    32.4                            28 - 32                            2017      

                                                                              [facility]                    

 

                                        Automated erythrocyte mean corpuscular hemoglobin concentration measurement (mass/volume)

                                        Automated erythrocyte mean corpuscular hemoglobin concentration

 measurement (mass/volume)    33.1                            31 - 35                

                    2017                                                                 

                                        [facility]                    

 

                          Automated erythrocyte mean corpuscular volume                            Automated

 erythrocyte mean corpuscular volume    98.0                            81 - 99      

                          2017                                                     

                                                      [facility]               

     

 

                          Blood erythrocytes automated count (number/volume)                            Blood

 erythrocytes automated count (number/volume)    3.95                            3.6 

- 5.1                            2017                                          

                                                      [facility]    

                

 

                          Blood hemoglobin measurement (moles/volume)                            Blood hemoglobin

 measurement (moles/volume)    12.8                            12.0 - 16.0           

                    2017                                                            

                                        [facility]                    



 

                          Blood leukocytes automated count (number/volume)                            Blood 

leukocytes automated count (number/volume)    5.61                            4.8 - 10.8

                            2017                                               

                                                      [facility]         

           

 

                          Blood lymphocytes count (number/volume)                            Blood lymphocytes

 count (number/volume)    1.5                            1.0 - 3.2                   

                    2017                                                                    

                                        [facility]                    

 

                          Blood monocytes automated count (number/volume)                            Blood monocytes

 automated count (number/volume)    0.7                            0.2 - 0.8         

                    2017                                                          

                                        [facility]                  

  

 

                    Glucose measurement                            Glucose measurement    102           

                          74 - 118                            2017                      

                                                                            [facility]                    

 

                          Plasma globulin measurement (mass/volume)                            Plasma globulin

 measurement (mass/volume)    2.6                            2.3 - 3.5               

                    2017                                                                

                                        [facility]                    

 

                                        Serum or plasma alanine aminotransferase measurement (enzymatic activity/volume)

                                        Serum or plasma alanine aminotransferase measurement (enzymatic

 activity/volume)    15                            0 - 55                            2017

                                                                                    [facility]                    

 

                          Serum or plasma albumin measurement (mass/volume)                            Serum

 or plasma albumin measurement (mass/volume)    4.2                            3.5 - 

5.0                            2017                                            

                                                      [facility]      

              

 

                          Serum or plasma albumin/globulin mass ratio                            Serum or plasma

 albumin/globulin mass ratio    1.6                            0.8 - 2.0             

                    2017                                                              

                                        [facility]                    

 

                                        Serum or plasma alkaline phosphatase measurement (enzymatic activity/volume)    

                                        Serum or plasma alkaline phosphatase measurement (enzymatic

 activity/volume)    68                            40 - 150                            

2017                                                                           

                                        [facility]                    

 

                    Serum or plasma anion gap                            Serum or plasma anion gap    14.7

                            8 - 16                            2017             

                                                                            [facility]                    

 

                          Serum or plasma calcium measurement (mass/volume)                            Serum

 or plasma calcium measurement (mass/volume)    10.5                            8.4 -

 10.2                            2017                                          

                                                      [facility]    

                

 

                                        Serum or plasma carbon dioxide, total measurement (moles/volume)                

                          Serum or plasma carbon dioxide, total measurement (moles/volume)    26   

                          22 - 29                            2017               

                                                                            [facility]                    

 

                          Serum or plasma chloride measurement (moles/volume)                            Serum

 or plasma chloride measurement (moles/volume)    104                            98 -

 107                            2017                                           

                                                      [facility]     

               

 

                                        Serum or plasma creatine kinase MB measurement (mass/volume)                    

                          Serum or plasma creatine kinase MB measurement (mass/volume)    2.50         

                          0.00 - 5.00                            2017                 

                                                                            [facility]                    

 

                                        Serum or plasma creatine kinase measurement (enzymatic activity/volume)         

                                        Serum or plasma creatine kinase measurement (enzymatic activity/volume)

                    315                            29 - 168                            2017      

                                                                              [facility]                    

 

                          Serum or plasma potassium measurement (moles/volume)                            Serum

 or plasma potassium measurement (moles/volume)    4.7                            3.5

 - 5.1                            2017                                         

                                                      [facility]   

                 

 

                          Serum or plasma protein measurement (mass/volume)                            Serum

 or plasma protein measurement (mass/volume)    6.8                            6.5 - 

8.1                            2017                                            

                                                      [facility]      

              

 

                          Serum or plasma sodium measurement (moles/volume)                            Serum

 or plasma sodium measurement (moles/volume)    140                            136 - 

145                            2017                                            

                                                      [facility]      

              

 

                                        Serum or plasma total bilirubin measurement (mass/volume)                       

                          Serum or plasma total bilirubin measurement (mass/volume)    0.7                

                    0.2 - 1.2                            2017                            

                                                        [facility]                    

 

                                        Troponin I measurement by highly sensitive enzyme immunoassay                   

                          Troponin I measurement by highly sensitive enzyme immunoassay    <0.001     

                          0 - 0.300                            2017               

                                                                            [facility]                    

 

                                                Red Cell Distribution Width    13.1                     

                    11.7 - 14.4                            2017                               

                                                      [facility]                    

 

                                            IM GRANULOCYTES %    0.2                            0.0 - 

1.0                            2017                                            

                                                      [facility]      

              

 

                                                Absolute Immature Granulocyte (auto    0.01             

                          0 - 0.1                            2017                         

                                                                            [facility]                    

 

                                                Aspartate Amino Transf (AST/SGOT)    27                 

                    5 - 34                            2017                                

                                                      [facility]                    

 

                                                B-Type Natriuretic Peptide    120.8                     

                    0 - 100                            2017                                   

                                                      [facility]

                    



                                                                                
                                                                                
                                                                                
                                                                                
                                                                                
   



Pathology Reports







                                        No Data Provided for This Section                    



                            



Diagnostic Reports

            





                                        No Data Provided for This Section                    



                                                            



Consultation Notes

                    





                                        No Data Provided for This Section                    



                                                            



Discharge Summaries

                    





                                        No Data Provided for This Section                    



                                                            



History and Physicals

                    





                                        No Data Provided for This Section                    



                                                                



Vital Signs

                         





                                        No Data Provided for This Section



                                                                 



Encounters

                    





                    Location                            Location Details                            Encounter

 Type                            Encounter Number                            Reason For

 Visit                            Attending Provider                            ADM Date

                            DC Date                            Status                

                                        Source                    

 

                                                                            Departed Emergency Room     

                          [id number]                                                  

                          JOMAR TRAN MD                            2017                                                        [facility]                    

 

                                                                            Registered Clinic           

                    [id number]                                                        CELE UNDERWOOD MD                            2017                                      

                                                      [facility]

                    

 

                                                                            Registered Clinic           

                    [id number]                                                        SRAVANTHI FRANKS MD                            2017                                     

                                                      [facility]

                    

 

                                                                            Registered Clinic           

                    [id number]                                                        CELE UNDERWOOD MD                            2018                                      

                                                      [facility]

                    

 

                                                                            Discharged Recurring        

                          [id number]                                                     

                    ELSA NICOLE MD                            2018

                                                        [facility]                    



                                                                                
                                       



Procedures

                    





                    Procedure                            Code                            Date           

                          Perfomer                            Comments                        

                                        Source                    

 

                          X-ray of chest, two views                            [id number]                    

                    2018                            [place]                                    

                                        [facility]                    

 

                                        Computed tomography of abdomen and pelvis with contrast                         

                    [id number]                            2017                            [place]   

                                                      [facility]                    



                                                                            



Assessment and Plan

                    





                                        No Data Provided for This Section                    



                                    



Plan of Care







                    Plan of Care        Date                Source

 

                                           Prescriptions See Medication Section

 

                              2018                            [facility]                    



                                                                        



Social History

                    





                    Social History                            Date                            Source    

                

 

                                           Social History Problem Response Recorded Date/Time Onset Date Status 

    Hx Psychiatric Problems

 No

 2016 3:28am

 Not Applicable

 Not Applicable

 

  Hx Eating Disorder

 No

 2016 3:28am

 Not Applicable

 Not Applicable

 

  Hx Substance Use Disorder

 No

 2016 3:28am

 Not Applicable

 Not Applicable

 

  Hx Depression

 No

 2016 3:28am

 Not Applicable

 Not Applicable

 

  Hx Alcohol Use

 No

 2016 3:28am

 Not Applicable

 Not Applicable

 

  Hx Substance Use Treatment

 No

 2016 3:28am

 Not Applicable

 Not Applicable

 

  Hx Physical Abuse

 No

 2016 3:28am

 Not Applicable

 Not Applicable

 

                              2018                            [facility]                    



                                                                    



Family History

                    





                                        No Data Provided for This Section                    



                                                                



Advance Directives

                    





                    Order Name                            Results                            Value      

                          Date                            Source                    

 

                          Advance Directives                            Advance Directives                  

                                           Directive Response Recorded Date/Time 

    Does the patient have an advance directive?

 Yes

 16 3:28am

 

  If yes, is advance directive on file with [facility]?

 No

 16 3:28am

 

  If not on file with [facility] will patient provide a copy?

 Yes

 16 3:28am

 

  Do you have a Directive to Physician?

 No

 18 1:32pm

 

  Do you have a Medical Power of ?

 No

 18 1:32pm

 

  Do you have an out of hospital Do Not Resuscitate Order?

 No

 18 1:32pm

 

  Do you have any special needs we should be aware of?

 No

 18 1:32pm

 

  Do you have a support person here with you today?

 No

 18 1:32pm

 

  Did patient receive Notice of Privacy Practices?

 Yes

 18 1:32pm

 

  Did patient receive patient rights and responsibilities?

 Yes

 18 1:32pm

 

                              2018                            [facility]                    



                                                                    



Functional Status

                    





                                        No Data Provided for This Section

## 2019-09-07 NOTE — DIAGNOSTIC IMAGING REPORT
EXAMINATION:  CHEST SINGLE (PORTABLE)    



INDICATION: Chest pain. 



COMPARISON:  Chest radiograph 11/5/2018.

     

FINDINGS:

TUBES and LINES:  None.



LUNGS:  The lungs are hyperinflated.  Rounded opacity overlying the left lower

lung likely represents nipple shadow. There is no evidence of pneumonia or

pulmonary edema.



PLEURA:  No pleural effusion or pneumothorax. 



HEART AND MEDIASTINUM:  The cardiomediastinal silhouette is unremarkable. There

are atherosclerotic calcifications within the aorta.



BONES AND SOFT TISSUES:  No acute osseous abnormality. Levoconvex curvature of

the lower thoracic spine.



UPPER ABDOMEN: No free air under the diaphragm.    



IMPRESSION: 

Emphysematous changes of the lungs. No acute radiographic abnormality.



Signed by: Dr. Radha Haddad MD on 9/7/2019 2:30 PM

## 2019-09-07 NOTE — XMS REPORT
Continuity of Care Document

                             Created on: 2019



EDIN CHIANG

External Reference #: [id number]

: 1932

Sex: Female



Demographics







                          Address                   [facility] [place]  [address]

 

                          Home Phone                [phone]

 

                          Preferred Language        English

 

                          Marital Status            Unknown

 

                          Anabaptist Affiliation     Unknown

 

                          Race                      Unknown

 

                          Ethnic Group              Unknown





Author







                          Author                    [employer]

 

                          Organization              [employer]

 

                          Address                   Unknown

 

                          Phone                     Unavailable







Care Team Providers







                    Care Team Member Name    Role                Phone

 

                    [employer]    Unavailable         Unavailable



                                    



Problems

                    





                    Problem                            Status                            Onset Date     

                          Classification                            Date Reported       

                          Comments                            Source                    

 

                    Febrile illness                            Active                            2016

                            Problem                            2018            

                                                      [facility]  

                  

 

                    Fecal impaction                            Active                            2016

                            Problem                            2018            

                                                      [facility]  

                  

 

                    Incomplete RBBB                            Active                            2016

                            Problem                            2018            

                                                      [facility]  

                  

 

                          ST segment changes on electrocardiogram                            Active         

                          2016                            Problem                     

                    2018                                                        [facility]                    



                                                                                
                                       



Medications

                    





                    Medication                            Details                            Route      

                          Status                            Patient Instructions         

                          Ordering Provider                            Order Date           

                                        Source                    

 

                                        Progesterone,Micronized (Prometrium) 100 Mg Capsule, 100 Mg Oral                

                    Daily                                                        Active        

                                                                            2015 

                                        [facility]                 

   

 

                                        Risedronate Sodium (Actonel) 150 Mg Tablet, 150 Mg Oral                         

                    Monthly                                                        Active               

                                                                            2015        

                                        [facility]                    

 

                          Hyoscyamine (Levsin) 0.125 Mg Tab, 0.125 Mg Sublingual                            

As Needed as needed                                                        Active    

                                                                                2015

                                        [facility]                

    

 

                          Ferrous Sulfate (Iron) 27 Mg Tablet, 27 Mg Oral                                   

                                                 Active                                

                                                      2014                       

                                        [facility]                    

 

                                        Gluc 2KCL/Chondr/Kayode Hy/Hy Ac (Glucosamine & Chondroitin Cap) 1 Each Capsule, 1

 Each Oral                            Daily                                          

                    Active                                                                   

                          2014                            [facility]                    

 

                          Naproxen 500 Mg Tablet, 500 Mg Oral                            2DAILY             

                                                Active                                      

                                                2014                            [facility]                    

 

                          Ascorbic Acid (Vitamin C) 500 Mg Capsule.sa                            Daily      

                                                  Active                               

                                                                                 [facility]                    

 

                    Biotin 1000MCG                            Daily                                     

                    Active                                                              

                                                      [facility]

                    

 

                                        Calcium Carbonate/Vitamin D3 (Caltrate 600 + D Chewable Tab) 1 Each Tab.chew    

                          Twice A Day                                                  

                    Active                                                                           

                                                      [facility]         

           

 

                                        Cholecalciferol (Vitamin D3) (Vitamin D3) 2,000 Unit Tablet                     

                    Daily                                                        [facility]                    

 

                                        Cyanocobalamin/Folic Acid (Vitamin B12-Folic Acid Tablet) 1 Each Tablet         

                    Daily                                                        [facility]                    



 

                          Esomeprazole Mag Trihydrate (Nexium) 40 Mg Capsule.                             

                                                       Active                        

                                                                                                    [facility]                    

 

                    Meloxicam 15 Mg Tablet                            Daily                             

                           [facility]                    

 

                          Risedronate Sodium (Atelvia) 35 Mg Tablet.dr                            Weekly    

                                                    Active                             

                                                                                   [facility]                    

 

                    Vitamin E 400IU Qd                            Daily                                 

                       [facility]                    



                                                                                
                                                                                
                                                                                
                                                                 



Allergies, Adverse Reactions, Alerts

        





                                        No Known Medication Allergies                      



                                                        



Immunizations

        





                                        No Data Provided for This Section



                                    



Results







                    Order Name                            Results                            Value      

                          Reference Range                            Date                

                          Interpretation                            Comments                       

                                        Source                    

 

                                        Estimated glomerular filtration rate (GFR) determination                        

                          Estimated glomerular filtration rate (GFR) determination    45                   

                    60                            2017                                      

                                                      [facility]

                    

 

                          Serum or plasma creatinine measurement (mass/volume)                            Serum

 or plasma creatinine measurement (mass/volume)    1.14                            0.57

 - 1.11                            2017                                        

                                                      [facility]  

                  

 

                                        Serum or plasma urea nitrogen measurement (mass/volume)                         

                          Serum or plasma urea nitrogen measurement (mass/volume)    17                     

                    7 - 26                            2017                                    

                                                      [facility]

                    

 

                          Serum or plasma urea nitrogen/creatinine mass ratio                            Serum

 or plasma urea nitrogen/creatinine mass ratio    15                            6 - 25

                            2017                                               

                                                      [facility]         

           

 

                          Automated blood basophil count (count/volume)                            Automated

 blood basophil count (count/volume)    0.1                            0.0 - 0.1     

                          2017                                                    

                                                      [facility]              

      

 

                                        Automated blood basophil count as percentage of total leukocytes                

                          Automated blood basophil count as percentage of total leukocytes    0.9  

                          0.0 - 1.0                            2017            

                                                                            [facility]                    

 

                          Automated blood eosinophil count                            Automated blood eosinophil

 count              0.1                            0.0 - 0.4                            2017

                                                                                    [facility]                    

 

                                        Automated blood eosinophil count as percentage of total leukocytes              

                          Automated blood eosinophil count as percentage of total leukocytes    2.5

                            0.0 - 6.0                            2017          

                                                                            [facility]                    

 

                          Automated blood hematocrit (volume fraction)                            Automated 

blood hematocrit (volume fraction)    38.7                            34.2 - 44.1    

                          2017                                                   

                                                      [facility]             

       

 

                                        Automated blood lymphocyte count as percentage ot total leukocytes              

                          Automated blood lymphocyte count as percentage ot total leukocytes    26.9

                            18.0 - 39.1                            2017        

                                                                            [facility]                    

 

                                        Automated blood monocyte count as percentage of total leukocytes                

                          Automated blood monocyte count as percentage of total leukocytes    13.2 

                           4.4 - 11.3                            2017          

                                                                            [facility]                    

 

                          Automated blood neutrophil count                            Automated blood neutrophil

 count              3.2                            2.1 - 6.9                            2017

                                                                                    [facility]                    

 

                          Automated blood platelet count (count/volume)                            Automated

 blood platelet count (count/volume)    250                            140 - 360     

                          2017                                                    

                                                      [facility]              

      

 

                                        Automated blood segmented neutrophil count as percentage of total leukocytes    

                                        Automated blood segmented neutrophil count as percentage of

 total leukocytes         56.3                            38.7 - 80.0                     

                    2017                                                                      

                                        [facility]                    

 

                                        Automated erythrocyte mean corpuscular hemoglobin (mass per erythrocyte)        

                                        Automated erythrocyte mean corpuscular hemoglobin (mass per erythrocyte)

                    32.4                            28 - 32                            2017      

                                                                              [facility]                    

 

                                        Automated erythrocyte mean corpuscular hemoglobin concentration measurement (mass/volume)

                                        Automated erythrocyte mean corpuscular hemoglobin concentration

 measurement (mass/volume)    33.1                            31 - 35                

                    2017                                                                 

                                        [facility]                    

 

                          Automated erythrocyte mean corpuscular volume                            Automated

 erythrocyte mean corpuscular volume    98.0                            81 - 99      

                          2017                                                     

                                                      [facility]               

     

 

                          Blood erythrocytes automated count (number/volume)                            Blood

 erythrocytes automated count (number/volume)    3.95                            3.6 

- 5.1                            2017                                          

                                                      [facility]    

                

 

                          Blood hemoglobin measurement (moles/volume)                            Blood hemoglobin

 measurement (moles/volume)    12.8                            12.0 - 16.0           

                    2017                                                            

                                        [facility]                    



 

                          Blood leukocytes automated count (number/volume)                            Blood 

leukocytes automated count (number/volume)    5.61                            4.8 - 10.8

                            2017                                               

                                                      [facility]         

           

 

                          Blood lymphocytes count (number/volume)                            Blood lymphocytes

 count (number/volume)    1.5                            1.0 - 3.2                   

                    2017                                                                    

                                        [facility]                    

 

                          Blood monocytes automated count (number/volume)                            Blood monocytes

 automated count (number/volume)    0.7                            0.2 - 0.8         

                    2017                                                          

                                        [facility]                  

  

 

                    Glucose measurement                            Glucose measurement    102           

                          74 - 118                            2017                      

                                                                            [facility]                    

 

                          Plasma globulin measurement (mass/volume)                            Plasma globulin

 measurement (mass/volume)    2.6                            2.3 - 3.5               

                    2017                                                                

                                        [facility]                    

 

                                        Serum or plasma alanine aminotransferase measurement (enzymatic activity/volume)

                                        Serum or plasma alanine aminotransferase measurement (enzymatic

 activity/volume)    15                            0 - 55                            2017

                                                                                    [facility]                    

 

                          Serum or plasma albumin measurement (mass/volume)                            Serum

 or plasma albumin measurement (mass/volume)    4.2                            3.5 - 

5.0                            2017                                            

                                                      [facility]      

              

 

                          Serum or plasma albumin/globulin mass ratio                            Serum or plasma

 albumin/globulin mass ratio    1.6                            0.8 - 2.0             

                    2017                                                              

                                        [facility]                    

 

                                        Serum or plasma alkaline phosphatase measurement (enzymatic activity/volume)    

                                        Serum or plasma alkaline phosphatase measurement (enzymatic

 activity/volume)    68                            40 - 150                            

2017                                                                           

                                        [facility]                    

 

                    Serum or plasma anion gap                            Serum or plasma anion gap    14.7

                            8 - 16                            2017             

                                                                            [facility]                    

 

                          Serum or plasma calcium measurement (mass/volume)                            Serum

 or plasma calcium measurement (mass/volume)    10.5                            8.4 -

 10.2                            2017                                          

                                                      [facility]    

                

 

                                        Serum or plasma carbon dioxide, total measurement (moles/volume)                

                          Serum or plasma carbon dioxide, total measurement (moles/volume)    26   

                          22 - 29                            2017               

                                                                            [facility]                    

 

                          Serum or plasma chloride measurement (moles/volume)                            Serum

 or plasma chloride measurement (moles/volume)    104                            98 -

 107                            2017                                           

                                                      [facility]     

               

 

                                        Serum or plasma creatine kinase MB measurement (mass/volume)                    

                          Serum or plasma creatine kinase MB measurement (mass/volume)    2.50         

                          0.00 - 5.00                            2017                 

                                                                            [facility]                    

 

                                        Serum or plasma creatine kinase measurement (enzymatic activity/volume)         

                                        Serum or plasma creatine kinase measurement (enzymatic activity/volume)

                    315                            29 - 168                            2017      

                                                                              [facility]                    

 

                          Serum or plasma potassium measurement (moles/volume)                            Serum

 or plasma potassium measurement (moles/volume)    4.7                            3.5

 - 5.1                            2017                                         

                                                      [facility]   

                 

 

                          Serum or plasma protein measurement (mass/volume)                            Serum

 or plasma protein measurement (mass/volume)    6.8                            6.5 - 

8.1                            2017                                            

                                                      [facility]      

              

 

                          Serum or plasma sodium measurement (moles/volume)                            Serum

 or plasma sodium measurement (moles/volume)    140                            136 - 

145                            2017                                            

                                                      [facility]      

              

 

                                        Serum or plasma total bilirubin measurement (mass/volume)                       

                          Serum or plasma total bilirubin measurement (mass/volume)    0.7                

                    0.2 - 1.2                            2017                            

                                                        [facility]                    

 

                                        Troponin I measurement by highly sensitive enzyme immunoassay                   

                          Troponin I measurement by highly sensitive enzyme immunoassay    <0.001     

                          0 - 0.300                            2017               

                                                                            [facility]                    

 

                                                Red Cell Distribution Width    13.1                     

                    11.7 - 14.4                            2017                               

                                                      [facility]                    

 

                                            IM GRANULOCYTES %    0.2                            0.0 - 

1.0                            2017                                            

                                                      [facility]      

              

 

                                                Absolute Immature Granulocyte (auto    0.01             

                          0 - 0.1                            2017                         

                                                                            [facility]                    

 

                                                Aspartate Amino Transf (AST/SGOT)    27                 

                    5 - 34                            2017                                

                                                      [facility]                    

 

                                                B-Type Natriuretic Peptide    120.8                     

                    0 - 100                            2017                                   

                                                      [facility]

                    



                                                                                
                                                                                
                                                                                
                                                                                
                                                                                
   



Pathology Reports







                                        No Data Provided for This Section                    



                            



Diagnostic Reports

            





                                        No Data Provided for This Section                    



                                                            



Consultation Notes

                    





                                        No Data Provided for This Section                    



                                                            



Discharge Summaries

                    





                                        No Data Provided for This Section                    



                                                            



History and Physicals

                    





                                        No Data Provided for This Section                    



                                                                



Vital Signs

                         





                                        No Data Provided for This Section



                                                                 



Encounters

                    





                    Location                            Location Details                            Encounter

 Type                            Encounter Number                            Reason For

 Visit                            Attending Provider                            ADM Date

                            DC Date                            Status                

                                        Source                    

 

                                                                            Departed Emergency Room     

                          [id number]                                                  

                          JOMAR TRAN MD                            2017                                                        [facility]                    

 

                                                                            Registered Clinic           

                    [id number]                                                        CELE UNDERWOOD MD                            2017                                      

                                                      [facility]

                    

 

                                                                            Registered Clinic           

                    [id number]                                                        SRAVANTHI FRANKS MD                            2017                                     

                                                      [facility]

                    

 

                                                                            Registered Clinic           

                    [id number]                                                        CELE UNDERWOOD MD                            2018                                      

                                                      [facility]

                    

 

                                                                            Discharged Recurring        

                          [id number]                                                     

                    ELSA NICOLE MD                            2018

                                                        [facility]                    



                                                                                
                                       



Procedures

                    





                    Procedure                            Code                            Date           

                          Perfomer                            Comments                        

                                        Source                    

 

                          X-ray of chest, two views                            [id number]                    

                    2018                            [place]                                    

                                        [facility]                    

 

                                        Computed tomography of abdomen and pelvis with contrast                         

                    [id number]                            2017                            [place]   

                                                      [facility]                    



                                                                            



Assessment and Plan

                    





                                        No Data Provided for This Section                    



                                    



Plan of Care







                    Plan of Care        Date                Source

 

                                           Prescriptions See Medication Section

 

                              2018                            [facility]                    



                                                                        



Social History

                    





                    Social History                            Date                            Source    

                

 

                                           Social History Problem Response Recorded Date/Time Onset Date Status 

    Hx Psychiatric Problems

 No

 2016 3:28am

 Not Applicable

 Not Applicable

 

  Hx Eating Disorder

 No

 2016 3:28am

 Not Applicable

 Not Applicable

 

  Hx Substance Use Disorder

 No

 2016 3:28am

 Not Applicable

 Not Applicable

 

  Hx Depression

 No

 2016 3:28am

 Not Applicable

 Not Applicable

 

  Hx Alcohol Use

 No

 2016 3:28am

 Not Applicable

 Not Applicable

 

  Hx Substance Use Treatment

 No

 2016 3:28am

 Not Applicable

 Not Applicable

 

  Hx Physical Abuse

 No

 2016 3:28am

 Not Applicable

 Not Applicable

 

                              2018                            [facility]                    



                                                                    



Family History

                    





                                        No Data Provided for This Section                    



                                                                



Advance Directives

                    





                    Order Name                            Results                            Value      

                          Date                            Source                    

 

                          Advance Directives                            Advance Directives                  

                                           Directive Response Recorded Date/Time 

    Does the patient have an advance directive?

 Yes

 16 3:28am

 

  If yes, is advance directive on file with [facility]?

 No

 16 3:28am

 

  If not on file with [facility] will patient provide a copy?

 Yes

 16 3:28am

 

  Do you have a Directive to Physician?

 No

 18 1:32pm

 

  Do you have a Medical Power of ?

 No

 18 1:32pm

 

  Do you have an out of hospital Do Not Resuscitate Order?

 No

 18 1:32pm

 

  Do you have any special needs we should be aware of?

 No

 18 1:32pm

 

  Do you have a support person here with you today?

 No

 18 1:32pm

 

  Did patient receive Notice of Privacy Practices?

 Yes

 18 1:32pm

 

  Did patient receive patient rights and responsibilities?

 Yes

 18 1:32pm

 

                              2018                            [facility]                    



                                                                    



Functional Status

                    





                                        No Data Provided for This Section

## 2019-09-07 NOTE — XMS REPORT
Summary of Care

                             Created on: 2019



Bev Arredondo

External Reference #: [id number]

: 1932

Sex: Female



Demographics







                          Address                   [address] [place] [address]  [address]

 

                          Home Phone                [phone]

 

                          Preferred Language        English

 

                          Marital Status            

 

                          Zoroastrianism Affiliation     PRO

 

                          Race                      White

 

                          Ethnic Group              Non-





Author







                          Author                    [facility]

 

                          Organization              [facility]

 

                          Address                   Unknown

 

                          Phone                     Unavailable







Support







                Name            Relationship    Address         Phone

 

                Sarah Francis    ECON            Unknown         [phone]

 

                Drea Pradhan    ECON            Unknown         [phone]







Care Team Providers







                    Care Team Member Name    Role                Phone

 

                    Marni Fragoso S    PCP                 [phone]







Reason for Visit

* Auth/Cert





                          Referred By Contact       Referred To Contact



                 Status          Reason          Specialty       Diagnoses /  



                                         Procedures  

 

                                        



                                        



[facility]



[address] [place] [id number]



Phone: [phone]



Fax: [phone]



                           Cardiac                   Diagnoses  



                           Electrophysiolog          i48.0

  



                           y                         P  



                                         rocedures  



                                         SD PERQ CLSR TCAT  



                                         L ATR APNDGE  



                                         W/ENDOCARDIAL  



                                         IMPLNT  











Encounter Details







                          Care Team                 Description



                     Date                Type                Department  

 

                                        



Lilliam Rose MD



[address] [place] [address]



[phone] (Fax)





1, [facility] Cardiac Proc Room





Anesthesia, [facility] Ep Lab                  Persistent atrial fibrillation (Primary Dx)



                     2019          [facility]  



                           Encounter                 - Heart Station, [address] [address] [phone]  







Allergies







                                        Comments



                 Active Allergy     Reactions       Severity        Noted Date 

 

                                        



Very sleepy



                     Codeine             Other - See         2019 



                                         comments   



documented as of this encounter (statuses as of 2019)



Medications

No known medicationsdocumented as of this encounter (statuses as of 2019)



Active Problems





Not on filedocumented as of this encounter (statuses as of 2019)



Social History







                                        Date



                 Tobacco Use     Types           Packs/Day       Years Used 

 

                                         



                                         Never Assessed    









 



                           Sex Assigned at Birth     Date Recorded

 

 



                                         Not on file 









                                        Industry



                           Job Start Date            Occupation 

 

                                        Not on file



                           Not on file               Not on file 









                                        Travel End



                           Travel History            Travel Start 

 





                                         No recent travel history available.



documented as of this encounter



Last Filed Vital Signs







                    Reading             Time Taken          Comments



                                         Vital Sign   

 

                    162/84              2019  2:15 PM CDT     



                                         Blood Pressure   

 

                    72                  2019  2:15 PM CDT     



                                         Pulse   

 

                    36.9 C (98.5 F)    2019  6:18 AM CDT     



                                         Temperature   

 

                    18                  2019  2:15 PM CDT     



                                         Respiratory Rate   

 

                    95%                 2019  2:15 PM CDT     



                                         Oxygen Saturation   

 

                    -                   -                    



                                         Inhaled Oxygen   



                                         Concentration   

 

                    61.2 kg (135 lb)    2019  6:18 AM CDT     



                                         Weight   

 

                    170.2 cm (5' 7")    2019  6:18 AM CDT     



                                         Height   

 

                    21.14               2019  6:18 AM CDT     



                                         Body Mass Index   



documented in this encounter



Discharge Instructions

* Instructions* 



 Leonarda Pantoja RN - 2019



Formatting of this note might be different from the original.

Patient Discharge Instructions



Follow instructions as indicated below:

Discharge Orders 

Activity As Tolerated 



Lift nothing heavier than 5 pounds for 2 weeks 



Do not operate a motorized vehicle for 2 days 



Keep area dry and clean 



Do not remove band-aid for the first 24 hours after procedure 



Do not soak in bathtub or hot tub for the first 24 hours after procedure 



Watch for bleeding, swelling, pain, fever, and any discharge call the Cath Lab (
during hours) 

Order Comments: At nights, weekends or holidays, call the [facility] hospital 
at [phone]. Ask the  to page the Cardiac Cath Fellow who is on-ca
ll. 



Avoid making important life decisions for the first 48 hours 



No strenuous activity for 72 hours  



Drink plenty of water 



Continue previous outpatient medications 



Hold Metformin for 48 hours 



Cardiac (2 gm Sodium, Low Fat, Low Cholesterol) Diet; Texture: Regular. 



Texture Regular.  

Diabetic: No  



Discharge Condition - 



Discharge Condition: GOOD  



Discharge Activity 



Discharge Activity: As Tolerated  

Discharge Activity: No Heavy Lifting or Strenuous Activity  



Resume pre procedure diet 

Order Comments: Resume pre procedure diet 



No VTE Prophylaxis given- Patient low risk for VTE; Not ordered during hospitali
zation 



Weight: In general, sudden weight gains or losses should be reported to your pro
vider.  Cardiac patients should weigh daily and notify their provider for a weig
ht gain of 3 pounds per day or 5 pounds per week.



Follow-up appointments:



To schedule other appointments, call the [facility] at [phone]
[phone] or [phone].  You may also make appointments online by going to [email or web address]
[email or web address] and follow the Request Appointment quicklink.



Take Home Medications

These are medications ordered for you by your healthcare provider. Do not take a
ny other medications or supplements unless advised by your healthcare provider.



Patient's Medications 

START taking these medications 

 No medications on file 

CONTINUE taking these medications which have NOT CHANGED 

 APIXABAN 2.5 MG TABLET    Take 2.5 mg by mouth 2 (two) times daily. 

 LOSARTAN 25 MG TABLET    Take 25 mg by mouth daily. 

 METOPROLOL SUCCINATE XL 25 MG 24 HR TABLET    Take 25 mg by mouth daily. 

 OMEPRAZOLE ORAL    Take 40 mg by mouth daily. 

START taking Modified Medications as Prescribed 

 No medications on file 

STOP taking these medications 

 No medications on file 



Medications:  Your doctor may prescribe medicine to prevent blood clots.  You ma
y also have to take medicine to prevent chest pain.  Take your medicine as usual
after the procedure unless your doctor has told you to stop. Any changes in your
medicine's schedule will be explained to you.



For questions regarding follow-up instructions call the [facility]
r at [phone] or [phone]



For worsening symptoms/changing condition/problems or questions: 

 During normal business hours call the [facility] Cardiac Cath Lab at [phone].


 At nights, weekends or holidays, call the [facility] hospital  at [phone]
[phone].  Ask the  to page the Cardiac Cath Fellow who is on-call.

 Emergency: Go to the closest emergency room or call [phone]



Signs and Symptoms of a Problem:  Call your doctor if you have any of the follow
ing problems:

 Fever

 Swelling, pain, redness around the puncture site

 Foul smell or drainage from the site

 Odd changes in sensations, like numbness, tingling, coldness or pain in the a
rm or leg where the catheter was inserted.



If you start Bleeding: Apply pressure to the site.  If the bleeding continues, h
ave someone call your doctor and make arrangements to see him/her.  Please follo
w the doctor's directions.



Our Goal is to Always Provide You with Very Good Care!

We will be mailing you a survey, Please complete and return at your convenience.

Thank You



TOBACCO AVOIDANCE



Exposure to tobacco either from smoking or from second hand (environmental) smok
e or smokeless tobacco (snuff) is damaging to your health.  This information is 
to encourage everyone to avoid tobacco exposure.  It is recommended that you:

? If you smoke or use smokeless tobacco, we encourage you to quit.

? If you have already quit smoking, continue your good work! 

? If you do not smoke or use smokeless tobacco, do not start.

? Avoid secondhand smoke.



Additional Resources

You may want to contact these organizations for further information on smoking a
nd how to quit.

American Lung Association, http://www.lungusa.org/stop-smoking/ 

American Cancer Society, http://www.cancer.org/Healthy/StayAwayfromTobacco/index


American Heart Association, http://www.heart.org/HEARTORG/GettingHealthy/QuitSmo
anali/Quit-Smoking_[facility]_001085_SubHomePage.jsp





documented in this encounter



Plan of Treatment







   



                 Health Maintenance     Due Date        Last Done       Comments

 

   



                           DTaP,Tdap,and Td Vaccines     10/02/1951  



                                         (1 - Tdap)   

 

   



                           Zoster Recombinant        10/02/1982  



                                         Vaccine (SHINGRIX) (1 of   



                                         2)   

 

   



                           Medicare Wellness Visit     10/02/1997  

 

   



                           Osteoporosis Screening     10/02/1997  

 

   



                           PNEUMOCOCCAL VACCINES 65+     10/02/1997  



                                         (1 of 2 - PCV13)   

 

   



                           INFLUENZA VACCINE         2019  



documented as of this encounter



Procedures







                                        Comments



                 Procedure Name     Priority        Date/Time       Associated Diagnosis 

 

                                        



Results for this procedure are in the results section.



                     ABORH CONFIRMATION     Routine             2019  



                                         6:45 AM CDT  

 

                                        



Results for this procedure are in the results section.



                 CBC WITH DIFFERENTIAL     Routine         2019      Persistent atrial 



                           6:16 AM CDT               fibrillation 

 

                                        



Results for this procedure are in the results section.



                 PROTHROMBIN TIME / INR     Routine         2019      Persistent atrial 



                           6:16 AM CDT               fibrillation 

 

                                        



Results for this procedure are in the results section.



                 CBC WITH DIFF     Routine         2019      Persistent atrial 



                           6:16 AM CDT               fibrillation 

 

                                        



Results for this procedure are in the results section.



                 BASIC METABOLIC PANEL     Routine         2019      Persistent atrial 



                     (NA, K, CL, CO2, GLUCOSE,      6:16 AM CDT         fibrillation 



                                         BUN, CREATININE, CA)    

 

                                        



Results for this procedure are in the results section.



                 TYPE AND SCREEN     ASAP            2019      Persistent atrial 



                           6:00 AM CDT               fibrillation 

 

                                         



                     NOTICE OF PRIVACY     Routine             2019  



                           PRACTICES                 5:41 AM CDT  

 

                                         



                     CONSENT/REFUSAL FOR     Routine             2019  



                           DIAGNOSIS AND TREATMENT      5:41 AM CDT  

 

                                         



                     ASSIGNMENT OF BENEFITS     Routine             2019  



                                         5:40 AM CDT  

 

                                         



                     PHYSICIAN CERTIFICATION     Routine             2019  



                           STATEMENT                 12:01 AM CDT  



documented in this encounter



Results

* ABORH CONFIRMATION (2019  6:45 AM CDT)





    



              Component     Value        Ref Range     Performed At     Pathologist



                                         Signature

 

    



                     ABO & RH            AB Positive         LAB 



                                         Comment:   



                                         Performed at [facility]   



                                         [facility]   



                                         [address] [place] [address]   



                                         [id number]   



                                         Toll Free: [phone]   



                                         CLIA No. [id number]   













                                         Specimen

 











   



                 Performing Organization     Address         City/State/Zipcode     Phone Number

 

   



                                         [facility]   

 

   



                                         LAB   





* CBC WITH DIFFERENTIAL (2019  6:16 AM CDT)





    



              Component     Value        Ref Range     Performed At     Pathologist



                                         Signature

 

    



                 WBC             5.94            4.30 - 11.10     [facility] 



                           10*3/L                  [facility] 

 

    



                 RBC             3.67 (L)        3.93 - 5.25 10*6/L     [facility] 



                                         [facility] 

 

    



                 HGB             12.0            11.6 - 15.0 g/dL     [facility] 



                                         [facility] 

 

    



                 HCT             38.5            35.7 - 45.2 %     [facility] 



                                         [facility] 

 

    



                 MCV             104.9 (H)       80.6 - 95.5 fL     [facility] 



                                         [facility] 

 

    



                 MCH             32.7            25.9 - 32.8 pg     [facility] 



                                         [facility] 

 

    



                 MCHC            31.2 (L)        31.6 - 35.1 g/dL     [facility] 



                                         [facility] 

 

    



                 RDW-SD          53.6 (H)        39.0 - 49.9 fL     [facility] 



                                         [facility] 

 

    



                 RDW-CV          13.8            12.0 - 15.5 %     [facility] 



                                         [facility] 

 

    



                 PLT             237             166 - 358 10*3/L     [facility] 



                                         [facility] 

 

    



                 MPV             10.5            9.5 - 12.9 fL     [facility] 



                                         [facility] 

 

    



                 NRBC/100 WBC     0.0             0.0 - 10.0 /100 WBCs     [facility] 



                                         [facility] 

 

    



                 NRBC x10^3      <0.01           10*3/L        [facility] 



                                         [facility] 

 

    



                 GRAN MAT (NEUT)     58.4            %               [facility] 



                           %                         [facility] 

 

    



                 IMM GRAN %      0.30            %               [facility] 



                                         [facility] 

 

    



                 LYMPH %         25.4            %               [facility] 



                                         [facility] 

 

    



                 MONO %          12.0            %               [facility] 



                                         [facility] 

 

    



                 EOS %           3.2             %               [facility] 



                                         [facility] 

 

    



                 BASO %          0.7             %               [facility] 



                                         [facility] 

 

    



                 GRAN MAT        3.47            1.88 - 7.09 10*3/uL     [facility] 



                           x10^3(ANC)                [facility] 

 

    



                 IMM GRAN x10^3     <0.03           0.00 - 0.06 10*3/uL     [facility] 



                                         [facility] 

 

    



                 LYMPH x10^3     1.51            1.32 - 3.29 10*3/uL     [facility] 



                                         [facility] 

 

    



                 MONO x10^3      0.71            0.33 - 0.92 10*3/uL     [facility] 



                                         [facility] 

 

    



                 EOS x10^3       0.19            0.03 - 0.39 10*3/uL     [facility] 



                                         [facility] 

 

    



                 BASO x10^3      0.04            0.01 - 0.07 10*3/uL     [facility] 



                                         [facility] 













                                         Specimen

 





                                         Blood









   



                 Performing Organization     Address         City/State/Zipcode     Phone Number

 

   



                 [facility]     CLIA: [id number], [address] [place], [place] [address] [phone]





                           [facility]   





* BASIC METABOLIC PANEL (NA, K, CL, CO2, GLUCOSE, BUN, CREATININE, CA) 
  (2019  6:16 AM CDT)





    



              Component     Value        Ref Range     Performed At     Pathologist



                                         Signature

 

    



                 NA              141             135 - 145 mmol/L     [facility] 



                                         [facility] 

 

    



                 K               4.2             3.5 - 5.0 mmol/L     [facility] 



                                         [facility] 

 

    



                 CL              105             98 - 108 mmol/L     [facility] 



                                         [facility] 

 

    



                 CO2 TOTAL       29              23 - 31 mmol/L     [facility] 



                                         [facility] 

 

    



                 AGAP            7               2 - 16          [facility] 



                                         [facility] 

 

    



                 BUN             20              7 - 23 mg/dL     [facility] 



                                         [facility] 

 

    



                 GLUCOSE         82              70 - 110 mg/dL     [facility] 



                                         [facility] 

 

    



                 CREATININE      1.05 (H)        0.50 - 1.04 mg/dL     [facility] 



                                         [facility] 

 

    



                 CALCIUM         11.0 (H)        8.6 - 10.6 mg/dL     [facility] 



                                         [facility] 

 

    



                 eGFR            49.7            mL/min/1.73m2     [facility] 



                           Calculation               [facility] 



                           (Non-[facility])    

 

    



                 eGFR            60.2            mL/min/1.73m2     [facility] 



                           Calculation               [facility] 



                           ([facility])    













                                         Specimen

 





                                         Blood









 



                           Narrative                 Performed At

 

 



                                         Association of Glomerular Filtration Rate (GFR) and Staging of Kidney Disease* 

                                         [facility]



                           +-----------------------+---------------------+-------------------------+     SERVICES-

[place]



                           | GFR (mL/min/1.73 m2)| With Kidney Damage|Without Kidney Damage     

CAMPUS



                                         +-----------------------+---------------------+-------------------------+ 



                                         |>90|Stage 



                                         one| Normal 



                                         +-----------------------+---------------------+-------------------------+ 



                                         |60-89|Stage 



                                         two| Decreased GFR 



                                         +-----------------------+---------------------+-------------------------+ 



                                         |30-59|Stage three|

 



                                         Stage three 



                                         +-----------------------+---------------------+-------------------------+ 



                                         |15-29|Stage four |

 



                                         Stage four 



                                         +-----------------------+---------------------+-------------------------+ 



                                         |<15 (or dialysis)|Stage five | Stage 



                                         five 



                                         +-----------------------+---------------------+-------------------------+ 



                                         *Each stage assumes the associated GFR level has been in effect for at least 



                                         three months.Stages 1 to 5, with or without kidney disease, indicate chronic

 



                                         kidney disease. 



                                         Notes: Determination of stages one and two (with eGFR >59mL/min/1.73 m2) 



                                         requires estimation of kidney damage for at least three months as defined by 



                                         structural or functional abnormalities of the kidney, manifested by either: 



                                         Pathological abnormalities or Markers of kidney damage (including abnormalities

 



                                         in the composition of the blood or urine or abnormalities in imaging tests). 









   



                 Performing Organization     Address         City/State/Zipcode     Phone Number

 

   



                 [facility]     CLIA: [id number], [address] [place] [address]     [phone]





                           [facility]   





* PROTHROMBIN TIME / INR (2019  6:16 AM CDT)





    



              Component     Value        Ref Range     Performed At     Pathologist



                                         [facility]

 

    



                 PROTIME PATIENT     11.1            10.1 - 12.6 Seconds     [facility] 



                                         [facility] 

 

    



                     INR                 1.0Comment: Normal INR <1.1;      [facility] 



                           Warfarin Therapeutic range 2.0      [facility] 



                           to 3.0 or 2.5 to 3.5,      [facility] 



                                         depending upon the   



                                         indications.   













                                         Specimen

 





                                         Blood









   



                 Performing Organization     Address         City/State/Zipcode     Phone Number

 

   



                 [facility]     CLIA: [id number], [address] [place] [address]     [phone]





                           [facility]   





* Type and Screen - ONCE ASAP (2019  6:00 AM CDT)





    



              Component     Value        Ref Range     Performed At     Pathologist



                                         Signature

 

    



                     ABO & RH            AB Positive         LAB 



                                         Comment:   



                                         Performed at [facility]   



                                         Services - [facility]   



                                         [address], [place]   



                                         [address]   



                                         Toll Free: [phone]   



                                         CLIA No. [id number]   

 

    



                     IAT                 Negative            LAB 



                                         Comment:   



                                         Performed at [facility]   



                                         Services - [facility]   



                                         [address], [place]   



                                         [address]   



                                         Toll Free: [phone]   



                                         CLIA No. [id number]   













                                         Specimen

 





                                         VENOUS









   



                 Performing Organization     Address         City/State/Zipcode     Phone Number

 

   



                                         [facility]   

 

   



                                         LAB   





documented in this encounter



Visit Diagnoses











                                         Diagnosis

 





                                         Persistent atrial fibrillation - Primary



                                         Atrial fibrillation



documented in this encounter



Administered Medications







                Action Date     Dose            Rate            Site



                           Medication Order          MAR Action    

 

                2019 10:34 AM CDT    10 mL                            



                           lidocaine 1% (PF) (XYLOCAINE) injection     Given    



                                         Infiltration, TITRATE - FOR PROCEDURE     



                                         USE, 1 dose, Starting Thu 19 at     



                                         1034, Until Thu 19 at 1034, Routine     

 

  



documented in this encounter



Insurance







                                        Type



            Payer      Benefit     Subscriber ID     Effective     Phone      Address 



                           Plan /                    Dates   



                                         Group     

 

                                        Medicare



            MEDICARE     MEDICARE     xxxxxxxxxx     10/1/1997-     [phone]     [address] 



                     [address]          Present             [id number] 



                                         [place], 



                                         [place] 



                                         [id number] 

 

                                        PPO/POS



            BCBS [facility]     BCBS FED     [id number]     2002-P     [phone]     [address]              [address]              [id number] 



                                         [place], [place] 



                                         [address] 









     



            Guarantor Name     Account     Relation to     Date of     Phone      Billing Address



                     Type                Patient             Birth  

 

     



            Bev Arredondo     Personal/F     Self       1932     [phone] [address]               (Home)              [place] [address]



documented as of this encounter

## 2019-09-07 NOTE — XMS REPORT
Summary of Care

                             Created on: 2019



Bev Arredondo

External Reference #: [id number]

: 1932

Sex: Female



Demographics







                          Address                   [address] [place], [place] [address]

 

                          Home Phone                [phone]

 

                          Preferred Language        English

 

                          Marital Status            

 

                          Sikhism Affiliation     PRO

 

                          Race                      White

 

                          Ethnic Group              Non-





Author







                          Author                    [facility]

 

                          Organization              [facility]

 

                          Address                   Unknown

 

                          Phone                     Unavailable







Support







                Name            Relationship    Address         Phone

 

                Sarah Francis    ECON            Unknown         [phone]

 

                Drea Pradhan    [employer]            Unknown         [phone]







Care Team Providers







                    Care Team Member Name    Role                Phone

 

                    FragosoMarni giron    PCP                 [phone]







Reason for Referral

*  (Routine)





                          Referred By Contact       Referred To Contact



                 Status          Reason          Specialty       Diagnoses /  



                                         Procedures  

 

                                        



Lilliam Rose MD



[address] [address]



Phone: [phone]



Fax: [phone]                       







                     New Request         Cardiology          Diagnoses  



                                         Permanent atrial  



                                         fibrillation

  



                                         P  



                                         rocedures  



                                         TRANSESOPHAGEAL  



                                         ECHO  







*  (Routine)





                          Referred By Contact       Referred To Contact



                 Status          Reason          Specialty       Diagnoses /  



                                         Procedures  

 

                                        



Lilliam Rose MD



[address] [place] [address]



Phone: [phone]



Fax: [phone]                       







                     New Request         Cardiology          Diagnoses  



                                         Permanent atrial  



                                         fibrillation

  



                                         P  



                                         rocedures  



                                         TRANSESOPHAGEAL  



                                         ECHO  







*  (Routine)





                          Referred By Contact       Referred To Contact



                 Status          Reason          Specialty       Diagnoses /  



                                         Procedures  

 

                                        



Lilliam Rose MD



[address] [address]



Phone: [phone]



Fax: [phone]                       







                     New Request         Cardiology          Diagnoses  



                                         Permanent atrial  



                                         fibrillation

  



                                         P  



                                         rocedures  



                                         TRANSESOPHAGEAL  



                                         ECHO  











Reason for Visit

* Auth/Cert





                          Referred By Contact       Referred To Contact



                 Status          Reason          Specialty       Diagnoses /  



                                         Procedures  

 

                                        



                                        



[facility]



[address] [facility]. [address] [place] [address]



Phone: [phone]



Fax: [phone]



                           Cardiac                   Diagnoses  



                           Electrophysiolog          i48.0

  



                           y                         P  



                                         rocedures  



                                         OK PERQ CLSR TCAT  



                                         L ATR APNDGE  



                                         W/ENDOCARDIAL  



                                         IMPLNT  











Encounter Details







                          Care Team                 Description



                     Date                Type                Department  

 

                                        



Lilliam Rose MD



[address] [address] [phone] (Fax)





2, [facility]                        Permanent atrial fibrillation (Primary Dx)



                     2019          Hospital            [facility]  



                           Encounter                 Echocardiograph Lab, [facility]  



                                         [address] [place] [address] [phone]  







Allergies







                                        Comments



                 Active Allergy     Reactions       Severity        Noted Date 

 

                                        



Very sleepy



                     Codeine             Other - See         2019 



                                         comments   



documented as of this encounter (statuses as of 2019)



Medications







                          End Date                  Status



              Medication     Sig          Dispensed     Refills      Start  



                                         Date  

 

                                                    Active



                     apixaban 2.5 mg tablet     Take 2.5 mg         0   



                                         by mouth 2     



                                         (two) times     



                                         daily.     

 

                                                    Active



                     OMEPRAZOLE ORAL     Take 40 mg by       0   



                                         mouth daily.     

 

                                                    Active



                     losartan 25 mg tablet     Take 25 mg by       0   



                                         mouth daily.     

 

                                                    Active



                     metoprolol succinate XL     Take 25 mg by       0   



                           25 mg 24 hr tablet        mouth daily.     



documented as of this encounter (statuses as of 2019)



Active Problems





Not on filedocumented as of this encounter (statuses as of 2019)



Social History







                                        Date



                 Tobacco Use     Types           Packs/Day       Years Used 

 

                                         



                                         Never Assessed    









 



                           Sex Assigned at Birth     Date Recorded

 

 



                                         Not on file 









                                        Industry



                           Job Start Date            Occupation 

 

                                        Not on file



                           Not on file               Not on file 









                                        Travel End



                           Travel History            Travel Start 

 





                                         No recent travel history available.



documented as of this encounter



Last Filed Vital Signs

Not on filedocumented in this encounter



Plan of Treatment







   



                 Health Maintenance     Due Date        Last Done       Comments

 

   



                           DTaP,Tdap,and Td Vaccines     [birth year]02/1951  



                                         (1 - Tdap)   

 

   



                           Zoster Recombinant        10/02/1982  



                                         Vaccine (SHINGRIX) (1 of   



                                         2)   

 

   



                           Medicare Wellness Visit     10/02/1997  

 

   



                           Osteoporosis Screening     10/02/1997  

 

   



                           PNEUMOCOCCAL VACCINES 65+     10/02/1997  



                                         (1 of 2 - PCV13)   

 

   



                           INFLUENZA VACCINE         2019  



documented as of this encounter



Results

Not on filedocumented in this encounter



Visit Diagnoses











                                         Diagnosis

 





                                         Permanent atrial fibrillation - Primary



                                         Atrial fibrillation



documented in this encounter



Insurance







                                        Type



            Payer      Benefit     Subscriber ID     Effective     Phone      Address 



                           Plan /                    Dates   



                                         Group     

 

                                        Medicare



            MEDICARE     MEDICARE     xxxxxxxxxx     10/1/1997-     [phone]     [address] 



                     [address]          Present             [id number] 



                                         [place], 



                                         [place] 



                                         [id number] 

 

                                        PPO/POS



            BCBS [facility]     BC FED     [id number]     2002-P     [phone]     [address]              [address]              [id number] 



                                         [place] 



                                         [address] 









     



            Guarantor Name     Account     Relation to     Date of     Phone      Billing Address



                     Type                Patient             Birth  

 

     



            Bev Arredondo     Personal/F     Self       1932     [phone] [address] [facility] [address]               ([place])              [place] [address]



documented as of this encounter

## 2019-09-07 NOTE — XMS REPORT
Continuity of Care Document

                             Created on: 2019



EDIN CHIANG

External Reference #: [id number]

: 1932

Sex: Female



Demographics







                          Address                   [facility] [place]  [address]

 

                          Home Phone                [phone]

 

                          Preferred Language        English

 

                          Marital Status            Unknown

 

                          Latter day Affiliation     Unknown

 

                          Race                      Unknown

 

                          Ethnic Group              Unknown





Author







                          Author                    [employer]

 

                          Organization              [employer]

 

                          Address                   Unknown

 

                          Phone                     Unavailable







Care Team Providers







                    Care Team Member Name    Role                Phone

 

                    [employer]    Unavailable         Unavailable



                                    



Problems

                    





                    Problem                            Status                            Onset Date     

                          Classification                            Date Reported       

                          Comments                            Source                    

 

                    Febrile illness                            Active                            2016

                            Problem                            2018            

                                                      [facility]  

                  

 

                    Fecal impaction                            Active                            2016

                            Problem                            2018            

                                                      [facility]  

                  

 

                    Incomplete RBBB                            Active                            2016

                            Problem                            2018            

                                                      [facility]  

                  

 

                          ST segment changes on electrocardiogram                            Active         

                          2016                            Problem                     

                    2018                                                        [facility]                    



                                                                                
                                       



Medications

                    





                    Medication                            Details                            Route      

                          Status                            Patient Instructions         

                          Ordering Provider                            Order Date           

                                        Source                    

 

                                        Progesterone,Micronized (Prometrium) 100 Mg Capsule, 100 Mg Oral                

                    Daily                                                        Active        

                                                                            2015 

                                        [facility]                 

   

 

                                        Risedronate Sodium (Actonel) 150 Mg Tablet, 150 Mg Oral                         

                    Monthly                                                        Active               

                                                                            2015        

                                        [facility]                    

 

                          Hyoscyamine (Levsin) 0.125 Mg Tab, 0.125 Mg Sublingual                            

As Needed as needed                                                        Active    

                                                                                2015

                                        [facility]                

    

 

                          Ferrous Sulfate (Iron) 27 Mg Tablet, 27 Mg Oral                                   

                                                 Active                                

                                                      2014                       

                                        [facility]                    

 

                                        Gluc 2KCL/Chondr/Kayode Hy/Hy Ac (Glucosamine & Chondroitin Cap) 1 Each Capsule, 1

 Each Oral                            Daily                                          

                    Active                                                                   

                          2014                            [facility]                    

 

                          Naproxen 500 Mg Tablet, 500 Mg Oral                            2DAILY             

                                                Active                                      

                                                2014                            [facility]                    

 

                          Ascorbic Acid (Vitamin C) 500 Mg Capsule.sa                            Daily      

                                                  Active                               

                                                                                 [facility]                    

 

                    Biotin 1000MCG                            Daily                                     

                    Active                                                              

                                                      [facility]

                    

 

                                        Calcium Carbonate/Vitamin D3 (Caltrate 600 + D Chewable Tab) 1 Each Tab.chew    

                          Twice A Day                                                  

                    Active                                                                           

                                                      [facility]         

           

 

                                        Cholecalciferol (Vitamin D3) (Vitamin D3) 2,000 Unit Tablet                     

                    Daily                                                        [facility]                    

 

                                        Cyanocobalamin/Folic Acid (Vitamin B12-Folic Acid Tablet) 1 Each Tablet         

                    Daily                                                        [facility]                    



 

                          Esomeprazole Mag Trihydrate (Nexium) 40 Mg Capsule.                             

                                                       Active                        

                                                                                                    [facility]                    

 

                    Meloxicam 15 Mg Tablet                            Daily                             

                           [facility]                    

 

                          Risedronate Sodium (Atelvia) 35 Mg Tablet.dr                            Weekly    

                                                    Active                             

                                                                                   [facility]                    

 

                    Vitamin E 400IU Qd                            Daily                                 

                       [facility]                    



                                                                                
                                                                                
                                                                                
                                                                 



Allergies, Adverse Reactions, Alerts

        





                                        No Known Medication Allergies                      



                                                        



Immunizations

        





                                        No Data Provided for This Section



                                    



Results







                    Order Name                            Results                            Value      

                          Reference Range                            Date                

                          Interpretation                            Comments                       

                                        Source                    

 

                                        Estimated glomerular filtration rate (GFR) determination                        

                          Estimated glomerular filtration rate (GFR) determination    45                   

                    60                            2017                                      

                                                      [facility]

                    

 

                          Serum or plasma creatinine measurement (mass/volume)                            Serum

 or plasma creatinine measurement (mass/volume)    1.14                            0.57

 - 1.11                            2017                                        

                                                      [facility]  

                  

 

                                        Serum or plasma urea nitrogen measurement (mass/volume)                         

                          Serum or plasma urea nitrogen measurement (mass/volume)    17                     

                    7 - 26                            2017                                    

                                                      [facility]

                    

 

                          Serum or plasma urea nitrogen/creatinine mass ratio                            Serum

 or plasma urea nitrogen/creatinine mass ratio    15                            6 - 25

                            2017                                               

                                                      [facility]         

           

 

                          Automated blood basophil count (count/volume)                            Automated

 blood basophil count (count/volume)    0.1                            0.0 - 0.1     

                          2017                                                    

                                                      [facility]              

      

 

                                        Automated blood basophil count as percentage of total leukocytes                

                          Automated blood basophil count as percentage of total leukocytes    0.9  

                          0.0 - 1.0                            2017            

                                                                            [facility]                    

 

                          Automated blood eosinophil count                            Automated blood eosinophil

 count              0.1                            0.0 - 0.4                            2017

                                                                                    [facility]                    

 

                                        Automated blood eosinophil count as percentage of total leukocytes              

                          Automated blood eosinophil count as percentage of total leukocytes    2.5

                            0.0 - 6.0                            2017          

                                                                            [facility]                    

 

                          Automated blood hematocrit (volume fraction)                            Automated 

blood hematocrit (volume fraction)    38.7                            34.2 - 44.1    

                          2017                                                   

                                                      [facility]             

       

 

                                        Automated blood lymphocyte count as percentage ot total leukocytes              

                          Automated blood lymphocyte count as percentage ot total leukocytes    26.9

                            18.0 - 39.1                            2017        

                                                                            [facility]                    

 

                                        Automated blood monocyte count as percentage of total leukocytes                

                          Automated blood monocyte count as percentage of total leukocytes    13.2 

                           4.4 - 11.3                            2017          

                                                                            [facility]                    

 

                          Automated blood neutrophil count                            Automated blood neutrophil

 count              3.2                            2.1 - 6.9                            2017

                                                                                    [facility]                    

 

                          Automated blood platelet count (count/volume)                            Automated

 blood platelet count (count/volume)    250                            140 - 360     

                          2017                                                    

                                                      [facility]              

      

 

                                        Automated blood segmented neutrophil count as percentage of total leukocytes    

                                        Automated blood segmented neutrophil count as percentage of

 total leukocytes         56.3                            38.7 - 80.0                     

                    2017                                                                      

                                        [facility]                    

 

                                        Automated erythrocyte mean corpuscular hemoglobin (mass per erythrocyte)        

                                        Automated erythrocyte mean corpuscular hemoglobin (mass per erythrocyte)

                    32.4                            28 - 32                            2017      

                                                                              [facility]                    

 

                                        Automated erythrocyte mean corpuscular hemoglobin concentration measurement (mass/volume)

                                        Automated erythrocyte mean corpuscular hemoglobin concentration

 measurement (mass/volume)    33.1                            31 - 35                

                    2017                                                                 

                                        [facility]                    

 

                          Automated erythrocyte mean corpuscular volume                            Automated

 erythrocyte mean corpuscular volume    98.0                            81 - 99      

                          2017                                                     

                                                      [facility]               

     

 

                          Blood erythrocytes automated count (number/volume)                            Blood

 erythrocytes automated count (number/volume)    3.95                            3.6 

- 5.1                            2017                                          

                                                      [facility]    

                

 

                          Blood hemoglobin measurement (moles/volume)                            Blood hemoglobin

 measurement (moles/volume)    12.8                            12.0 - 16.0           

                    2017                                                            

                                        [facility]                    



 

                          Blood leukocytes automated count (number/volume)                            Blood 

leukocytes automated count (number/volume)    5.61                            4.8 - 10.8

                            2017                                               

                                                      [facility]         

           

 

                          Blood lymphocytes count (number/volume)                            Blood lymphocytes

 count (number/volume)    1.5                            1.0 - 3.2                   

                    2017                                                                    

                                        [facility]                    

 

                          Blood monocytes automated count (number/volume)                            Blood monocytes

 automated count (number/volume)    0.7                            0.2 - 0.8         

                    2017                                                          

                                        [facility]                  

  

 

                    Glucose measurement                            Glucose measurement    102           

                          74 - 118                            2017                      

                                                                            [facility]                    

 

                          Plasma globulin measurement (mass/volume)                            Plasma globulin

 measurement (mass/volume)    2.6                            2.3 - 3.5               

                    2017                                                                

                                        [facility]                    

 

                                        Serum or plasma alanine aminotransferase measurement (enzymatic activity/volume)

                                        Serum or plasma alanine aminotransferase measurement (enzymatic

 activity/volume)    15                            0 - 55                            2017

                                                                                    [facility]                    

 

                          Serum or plasma albumin measurement (mass/volume)                            Serum

 or plasma albumin measurement (mass/volume)    4.2                            3.5 - 

5.0                            2017                                            

                                                      [facility]      

              

 

                          Serum or plasma albumin/globulin mass ratio                            Serum or plasma

 albumin/globulin mass ratio    1.6                            0.8 - 2.0             

                    2017                                                              

                                        [facility]                    

 

                                        Serum or plasma alkaline phosphatase measurement (enzymatic activity/volume)    

                                        Serum or plasma alkaline phosphatase measurement (enzymatic

 activity/volume)    68                            40 - 150                            

2017                                                                           

                                        [facility]                    

 

                    Serum or plasma anion gap                            Serum or plasma anion gap    14.7

                            8 - 16                            2017             

                                                                            [facility]                    

 

                          Serum or plasma calcium measurement (mass/volume)                            Serum

 or plasma calcium measurement (mass/volume)    10.5                            8.4 -

 10.2                            2017                                          

                                                      [facility]    

                

 

                                        Serum or plasma carbon dioxide, total measurement (moles/volume)                

                          Serum or plasma carbon dioxide, total measurement (moles/volume)    26   

                          22 - 29                            2017               

                                                                            [facility]                    

 

                          Serum or plasma chloride measurement (moles/volume)                            Serum

 or plasma chloride measurement (moles/volume)    104                            98 -

 107                            2017                                           

                                                      [facility]     

               

 

                                        Serum or plasma creatine kinase MB measurement (mass/volume)                    

                          Serum or plasma creatine kinase MB measurement (mass/volume)    2.50         

                          0.00 - 5.00                            2017                 

                                                                            [facility]                    

 

                                        Serum or plasma creatine kinase measurement (enzymatic activity/volume)         

                                        Serum or plasma creatine kinase measurement (enzymatic activity/volume)

                    315                            29 - 168                            2017      

                                                                              [facility]                    

 

                          Serum or plasma potassium measurement (moles/volume)                            Serum

 or plasma potassium measurement (moles/volume)    4.7                            3.5

 - 5.1                            2017                                         

                                                      [facility]   

                 

 

                          Serum or plasma protein measurement (mass/volume)                            Serum

 or plasma protein measurement (mass/volume)    6.8                            6.5 - 

8.1                            2017                                            

                                                      [facility]      

              

 

                          Serum or plasma sodium measurement (moles/volume)                            Serum

 or plasma sodium measurement (moles/volume)    140                            136 - 

145                            2017                                            

                                                      [facility]      

              

 

                                        Serum or plasma total bilirubin measurement (mass/volume)                       

                          Serum or plasma total bilirubin measurement (mass/volume)    0.7                

                    0.2 - 1.2                            2017                            

                                                        [facility]                    

 

                                        Troponin I measurement by highly sensitive enzyme immunoassay                   

                          Troponin I measurement by highly sensitive enzyme immunoassay    <0.001     

                          0 - 0.300                            2017               

                                                                            [facility]                    

 

                                                Red Cell Distribution Width    13.1                     

                    11.7 - 14.4                            2017                               

                                                      [facility]                    

 

                                            IM GRANULOCYTES %    0.2                            0.0 - 

1.0                            2017                                            

                                                      [facility]      

              

 

                                                Absolute Immature Granulocyte (auto    0.01             

                          0 - 0.1                            2017                         

                                                                            [facility]                    

 

                                                Aspartate Amino Transf (AST/SGOT)    27                 

                    5 - 34                            2017                                

                                                      [facility]                    

 

                                                B-Type Natriuretic Peptide    120.8                     

                    0 - 100                            2017                                   

                                                      [facility]

                    



                                                                                
                                                                                
                                                                                
                                                                                
                                                                                
   



Pathology Reports







                                        No Data Provided for This Section                    



                            



Diagnostic Reports

            





                                        No Data Provided for This Section                    



                                                            



Consultation Notes

                    





                                        No Data Provided for This Section                    



                                                            



Discharge Summaries

                    





                                        No Data Provided for This Section                    



                                                            



History and Physicals

                    





                                        No Data Provided for This Section                    



                                                                



Vital Signs

                         





                                        No Data Provided for This Section



                                                                 



Encounters

                    





                    Location                            Location Details                            Encounter

 Type                            Encounter Number                            Reason For

 Visit                            Attending Provider                            ADM Date

                            DC Date                            Status                

                                        Source                    

 

                                                                            Departed Emergency Room     

                          [id number]                                                  

                          JOMAR TRAN MD                            2017                                                        [facility]                    

 

                                                                            Registered Clinic           

                    [id number]                                                        CELE UNDERWOOD MD                            2017                                      

                                                      [facility]

                    

 

                                                                            Registered Clinic           

                    [id number]                                                        SRAVANTHI FRANKS MD                            2017                                     

                                                      [facility]

                    

 

                                                                            Registered Clinic           

                    [id number]                                                        CELE UNDERWOOD MD                            2018                                      

                                                      [facility]

                    

 

                                                                            Discharged Recurring        

                          [id number]                                                     

                    ELSA NICOLE MD                            2018

                                                        [facility]                    



                                                                                
                                       



Procedures

                    





                    Procedure                            Code                            Date           

                          Perfomer                            Comments                        

                                        Source                    

 

                          X-ray of chest, two views                            [id number]                    

                    2018                            [facility]                                    

                                        [facility]                    

 

                                        Computed tomography of abdomen and pelvis with contrast                         

                    [id number]                            2017                            [facility]   

                                                      [facility]                    



                                                                            



Assessment and Plan

                    





                                        No Data Provided for This Section                    



                                    



Plan of Care







                    Plan of Care        Date                Source

 

                                           Prescriptions See Medication Section

 

                              2018                            [facility]                    



                                                                        



Social History

                    





                    Social History                            Date                            Source    

                

 

                                           Social History Problem Response Recorded Date/Time Onset Date Status 

    Hx Psychiatric Problems

 No

 2016 3:28am

 Not Applicable

 Not Applicable

 

  Hx Eating Disorder

 No

 2016 3:28am

 Not Applicable

 Not Applicable

 

  Hx Substance Use Disorder

 No

 2016 3:28am

 Not Applicable

 Not Applicable

 

  Hx Depression

 No

 2016 3:28am

 Not Applicable

 Not Applicable

 

  Hx Alcohol Use

 No

 2016 3:28am

 Not Applicable

 Not Applicable

 

  Hx Substance Use Treatment

 No

 2016 3:28am

 Not Applicable

 Not Applicable

 

  Hx Physical Abuse

 No

 2016 3:28am

 Not Applicable

 Not Applicable

 

                              2018                            [facility]                    



                                                                    



Family History

                    





                                        No Data Provided for This Section                    



                                                                



Advance Directives

                    





                    Order Name                            Results                            Value      

                          Date                            Source                    

 

                          Advance Directives                            Advance Directives                  

                                           Directive Response Recorded Date/Time 

    Does the patient have an advance directive?

 Yes

 16 3:28am

 

  If yes, is advance directive on file with [facility]?

 No

 16 3:28am

 

  If not on file with [facility] will patient provide a copy?

 Yes

 16 3:28am

 

  Do you have a Directive to Physician?

 No

 18 1:32pm

 

  Do you have a Medical Power of ?

 No

 18 1:32pm

 

  Do you have an out of hospital Do Not Resuscitate Order?

 No

 18 1:32pm

 

  Do you have any special needs we should be aware of?

 No

 18 1:32pm

 

  Do you have a support person here with you today?

 No

 18 1:32pm

 

  Did patient receive Notice of Privacy Practices?

 Yes

 18 1:32pm

 

  Did patient receive patient rights and responsibilities?

 Yes

 18 1:32pm

 

                              2018                            [facility]                    



                                                                    



Functional Status

                    





                                        No Data Provided for This Section

## 2019-09-07 NOTE — NUR
Assessment done.no resp.distress.no pain voiced.bp checked and noted 115/67.iv to right for 
arm patent.bed locked and in lowest position.phone and call light within reach.instructed to 
call for asistance as needed.

## 2019-09-08 VITALS — DIASTOLIC BLOOD PRESSURE: 56 MMHG | SYSTOLIC BLOOD PRESSURE: 113 MMHG

## 2019-09-08 VITALS — DIASTOLIC BLOOD PRESSURE: 61 MMHG | SYSTOLIC BLOOD PRESSURE: 131 MMHG

## 2019-09-08 VITALS — SYSTOLIC BLOOD PRESSURE: 150 MMHG | DIASTOLIC BLOOD PRESSURE: 68 MMHG

## 2019-09-08 VITALS — DIASTOLIC BLOOD PRESSURE: 73 MMHG | SYSTOLIC BLOOD PRESSURE: 149 MMHG

## 2019-09-08 VITALS — DIASTOLIC BLOOD PRESSURE: 68 MMHG | SYSTOLIC BLOOD PRESSURE: 150 MMHG

## 2019-09-08 VITALS — SYSTOLIC BLOOD PRESSURE: 149 MMHG | DIASTOLIC BLOOD PRESSURE: 67 MMHG

## 2019-09-08 LAB
ALBUMIN SERPL-MCNC: 3.8 G/DL (ref 3.5–5)
ALBUMIN/GLOB SERPL: 1.4 {RATIO} (ref 0.8–2)
ALP SERPL-CCNC: 73 IU/L (ref 40–150)
ALT SERPL-CCNC: 7 IU/L (ref 0–55)
ANION GAP SERPL CALC-SCNC: 15.4 MMOL/L (ref 8–16)
BACTERIA URNS QL MICRO: (no result) /HPF
BASOPHILS # BLD AUTO: 0 10*3/UL (ref 0–0.1)
BASOPHILS NFR BLD AUTO: 0.6 % (ref 0–1)
BILIRUB UR QL: NEGATIVE
BUN SERPL-MCNC: 29 MG/DL (ref 7–26)
BUN/CREAT SERPL: 22 (ref 6–25)
CALCIUM SERPL-MCNC: 10.3 MG/DL (ref 8.4–10.2)
CHLORIDE SERPL-SCNC: 105 MMOL/L (ref 98–107)
CHOLEST SERPL-MCNC: 185 MD/DL (ref 0–199)
CHOLEST/HDLC SERPL: 3.7 {RATIO} (ref 3–3.6)
CK MB SERPL-MCNC: 0.7 NG/ML (ref 0–5)
CK SERPL-CCNC: 204 IU/L (ref 29–168)
CLARITY UR: CLEAR
CO2 SERPL-SCNC: 22 MMOL/L (ref 22–29)
COLOR UR: YELLOW
CREAT UR-MCNC: 65.48 MG/DL (ref 47–110)
DEPRECATED NEUTROPHILS # BLD AUTO: 4.2 10*3/UL (ref 2.1–6.9)
DEPRECATED RBC URNS MANUAL-ACNC: (no result) /HPF (ref 0–5)
EGFRCR SERPLBLD CKD-EPI 2021: 38 ML/MIN (ref 60–?)
EOSINOPHIL # BLD AUTO: 0.2 10*3/UL (ref 0–0.4)
EOSINOPHIL NFR BLD AUTO: 2.8 % (ref 0–6)
EOSINOPHIL URNS QL WRIGHT STN: (no result)
EPI CELLS URNS QL MICRO: (no result) /LPF
ERYTHROCYTE [DISTWIDTH] IN CORD BLOOD: 13.2 % (ref 11.7–14.4)
GLOBULIN PLAS-MCNC: 2.8 G/DL (ref 2.3–3.5)
GLUCOSE SERPLBLD-MCNC: 96 MG/DL (ref 74–118)
HCT VFR BLD AUTO: 36.8 % (ref 34.2–44.1)
HDLC SERPL-MSCNC: 50 MG/DL (ref 40–60)
HGB BLD-MCNC: 11.8 G/DL (ref 12–16)
KETONES UR QL STRIP.AUTO: NEGATIVE
LDLC SERPL CALC-MCNC: 119 MG/DL (ref 60–130)
LEUKOCYTE ESTERASE UR QL STRIP.AUTO: NEGATIVE
LYMPHOCYTES # BLD: 1.7 10*3/UL (ref 1–3.2)
LYMPHOCYTES NFR BLD AUTO: 23.6 % (ref 18–39.1)
MCH RBC QN AUTO: 32.4 PG (ref 28–32)
MCHC RBC AUTO-ENTMCNC: 32.1 G/DL (ref 31–35)
MCV RBC AUTO: 101.1 FL (ref 81–99)
MONOCYTES # BLD AUTO: 1 10*3/UL (ref 0.2–0.8)
MONOCYTES NFR BLD AUTO: 13.8 % (ref 4.4–11.3)
NEUTS SEG NFR BLD AUTO: 58.9 % (ref 38.7–80)
NITRITE UR QL STRIP.AUTO: NEGATIVE
PLATELET # BLD AUTO: 248 X10E3/UL (ref 140–360)
POTASSIUM SERPL-SCNC: 4.4 MMOL/L (ref 3.5–5.1)
PROT UR QL STRIP.AUTO: NEGATIVE
RBC # BLD AUTO: 3.64 X10E6/UL (ref 3.6–5.1)
SODIUM SERPL-SCNC: 138 MMOL/L (ref 136–145)
SODIUM UR-SCNC: 94 MMOL/L
SP GR UR STRIP: 1.01 (ref 1.01–1.02)
TRIGL SERPL-MCNC: 79 MG/DL (ref 0–149)
UROBILINOGEN UR STRIP-MCNC: 0.2 MG/DL (ref 0.2–1)
WBC #/AREA URNS HPF: (no result) /HPF (ref 0–5)

## 2019-09-08 RX ADMIN — METOPROLOL SUCCINATE SCH MG: 25 TABLET, EXTENDED RELEASE ORAL at 08:26

## 2019-09-08 RX ADMIN — FAMOTIDINE SCH MG: 10 INJECTION, SOLUTION INTRAVENOUS at 04:08

## 2019-09-08 RX ADMIN — SODIUM CHLORIDE SCH MLS/HR: 9 INJECTION, SOLUTION INTRAVENOUS at 06:02

## 2019-09-08 NOTE — CONSULTATION
DATE OF CONSULTATION:    

 

REASON FOR CONSULT:  Increased BUN and creatinine.

 

HISTORY OF PRESENT ILLNESS:  This is an 86-year-old female, who has a history of aortic

valve endocarditis, atrial fibrillation, who presented with complaining of dizziness and

sharp left chest pain, was found to have creatinine of 1.33. 

 

PAST MEDICAL HISTORY:  Includes:

1. Hypertension.

2. Hyperlipidemia.

3. History of DVT and PE.

4. Active fibrillation, on Eliquis.

5. Moderate aortic stenosis.

6. Aortic valve endocarditis, previously status post treatment.

 

FAMILY HISTORY:  Noncontributory.

 

SOCIAL HISTORY:  No smoking.  No alcohol.  No drugs.

 

REVIEW OF SYSTEMS:

No dysuria.  No hematuria.  No blood in stool.  No blood in the urine.  No changes in

vision.  No changes in hearing.  No shortness of breath.  Positive chest pain.  No

change in appetite and nausea.  No vomiting.  No constipation.  Basically, all pertinent

are as above, otherwise negative. 

 

PHYSICAL EXAMINATION:

GENERAL:  Alert, following commands. 

HEENT:  Pupils are equal and reactive to light and accommodation. 

NECK:  No JVD.  No bruit. 

LUNGS:  Rhonchi.  No rales. 

HEART:  Regular rate and rhythm.  No S3, no S4. 

ABDOMEN:  Nontender and nondistended.  No hepatomegaly or splenomegaly. 

EXTREMITIES:  No clubbing, no cyanosis, no edema. 

NEUROLOGIC:  Cranial nerves II through XII grossly intact.  Sensation intact.  Motor

intact. 

VITAL SIGNS:  Blood pressure 124/87.

LABORATORY DATA:  White count 7.15, hemoglobin 11.8, and hematocrit 36.8.  Sodium 138,

potassium 4.4, chloride 105, creatinine 1.33, down from 1.47.  .  Albumin 3.8.

Urinalysis is negative.  Chest x-ray, emphysematous changes of the lungs. 

 

ASSESSMENT AND PLAN:  Most likely acute kidney failure.  We will check fractional

excretion of sodium and also check urine protein electrophoresis.  The patient is

anemic, but this can be also acute-on-chronic and chronic kidney disease can be

secondary to hypertension.  The is currently improving with decreased creatinine and ?

since the patient is having aortic valve issues plus diuretics, so we can think also of

over-diuresis, so for now, medications have been changed and will be monitoring the BUN

and creatinine, and other medication that the patient is on losartan for which we will

continue, but probably we will need to reintroduce diuretics at a 

slower rate and a lower dose, so in short, possible acute kidney failure, but also we

need to keep in mind chronic kidney disease, stage 3.  Please see orders. 

 

 

 

 

______________________________

Christopher Cheema MD MA/LEONCIO

D:  09/08/2019 12:36:31

T:  09/08/2019 19:04:54

Job #:  [id number]

## 2019-09-08 NOTE — HISTORY AND PHYSICAL
HISTORY OF PRESENT ILLNESS:  An 86-year-old female, who has a past medical history

positive for hypertension, came here to the hospital complaining of weakness and chest

pain.  Chest pain lasted a few seconds, it was sharp in nature in the middle of the

chest has gone and never came back again.  She had no other symptoms like shortness of

breath.  No vomiting.  The patient was admitted to the hospital due to the chest pain

and also acute renal insufficiency. 

 

REVIEW OF SYSTEMS:

CARDIOVASCULAR:  She had one isolated episode of chest pain that lasted few seconds has

gone.  No palpitation. 

RESPIRATORY:  No shortness of breath.  No cough. 

GASTROINTESTINAL:  No nausea or vomiting.  No diarrhea. 

GENITOURINARY:  No frequency.  No dysuria.

 

PAST MEDICAL HISTORY:  Positive for hypertension.

 

SOCIAL HISTORY:  She denies smoking or drinking.

 

PHYSICAL EXAMINATION:

VITAL SIGNS:  Blood pressure 150/68, temperature 97.7, heart rate is 76 per minute,

respiratory rate 18 per minute, O2 saturation 95%. 

HEART:  Showed regular rhythm.  Normal S1, S2 sound. 

LUNGS:  Clear bilaterally. 

ABDOMEN:  Soft. 

EXTREMITIES:  Show no evidence of cyanosis or hematoma.

LABORATORY DATA:  On the BMP; sodium 138, potassium 4.4, chloride 105 CO2 22, BUN 29,

creatinine 1.33, glucose 96.  On CBC; white count 7.15, hemoglobin 11.8, hematocrit

36.8, platelet count 248,000.  PT 13.5, INR 0.98, PTT 34.1.  AST 20, ALT 7, total

bilirubin 0.4, alkaline phosphatase 73.  Chest x-ray showed emphysema. 

 

FINAL IMPRESSION:  

1. Chest pain.

2. Acute renal failure.

3. Anemia.

4. Hypercalcemia.

 

PLAN OF TREATMENT:  EKG showed normal sinus rhythm.  No evidence of any ST-segment

elevation or depression.  We are going to continue IV fluids at 75 mL an hour, aspirin

81 mg daily, nitroglycerin 0.4 mg q.5 minutes p.r.n. for chest pain, not more than three

tablets.  Cardiology consult with Dr. Garcia has been requested.  Nephrology consult Dr. Meyer has been requested.  Renal ultrasound has been ordered.  Continue losartan 25 mg

daily.  Continue metoprolol 25 mg daily, Zofran 4 mg IV q.4 hours as needed hydralazine

10 mg IV q.4 hours as needed.  We are going to discontinue the IV fluids.  Repeat BMP

tomorrow. 

 

 

 

 

______________________________

MD AMARIS Troncoso

D:  09/08/2019 13:50:29

T:  09/08/2019 16:29:04

Job #:  [id number]

## 2019-09-08 NOTE — CONSULTATION
DATE OF CONSULTATION:  09/07/2019

 

Cardiology Consult Note 

 

REASON FOR CONSULT:  Chest pain and shortness of breath.

 

CHIEF COMPLAINT:  Chest pain and dizziness.

 

HISTORY OF PRESENT ILLNESS:  86-year-old woman who is known to our service.  She has a

history of aortic valve endocarditis; paroxysmal atrial fibrillation, on Eliquis;

history of brain aneurysm, status post ligation in 2004; venous insufficiency;

hypertension; history of DVT and PE, who presents with complaint about feeling very

dizzy this morning as well as having sharp left-sided chest pain.  Her chest pain has

now resolved and she feels much better.  She denies any heart failure symptoms

otherwise, no syncope. 

 

PAST MEDICAL HISTORY:  

1. Hypertension.

2. Hyperlipidemia.

3. History of aortic valve endocarditis, status post antibiotics.

4. Moderate aortic stenosis.

5. History of DVT and PE.

6. AFib, on Eliquis.

 

SOCIAL HISTORY:  She does not smoke, drink, or abuse drugs.

 

FAMILY HISTORY:  Noncontributory.

 

REVIEW OF SYSTEMS:

As per HPI, otherwise negative.

 

PHYSICAL EXAMINATION:

VITAL SIGNS:  Temperature afebrile, pulse 62, respiratory rate 20, blood pressure

124/87, and saturating 96% on room air. 

GENERAL:  Elderly female, in no acute distress. 

CARDIOVASCULAR:  Regular rate and rhythm, 2/6 systolic murmur at right upper sternal

border and radiating throughout the precordium. 

LUNGS:  Clear to auscultation bilaterally. 

ABDOMEN:  Soft, nontender, and nondistended. 

NEURO AND PSYCH:  Alert and oriented to person, place, and time.  Normal affect.

INPATIENT MEDICATIONS:  Reviewed.

 

LABORATORY DATA:  Reviewed.  Cardiac enzymes negative x1.  BNP is 45, and creatinine is

1.5 with BUN of 24. 

 

TELEMETRY DATA:  Reviewed, showed normal sinus rhythm.  Chest x-ray reviewed, shows no

acute abnormalities. 

 

ASSESSMENT:  

1. Moderate aortic stenosis.

2. Hypertension.

3. Hyperlipidemia.

4. Atrial fibrillation, on Eliquis.

5. History of lower extremity deep vein thrombosis and pulmonary embolism.

 

PLAN:  Continue home cardiovascular medications for blood pressure control.  The patient

was likely dehydrated on admission and given some IV fluids and now feels much better.

If her creatinine improves tomorrow, she is okay to be discharged from cardiovascular

standpoint and follow up in clinic as needed. 

 

Thank you for this consult.  We will continue to follow.

 

 

 

 

______________________________

MD DON Lopez/LEONCIO

D:  09/07/2019 20:59:19

T:  09/08/2019 02:13:48

Job #:  [id number]

## 2019-09-09 VITALS — DIASTOLIC BLOOD PRESSURE: 65 MMHG | SYSTOLIC BLOOD PRESSURE: 130 MMHG

## 2019-09-09 VITALS — SYSTOLIC BLOOD PRESSURE: 131 MMHG | DIASTOLIC BLOOD PRESSURE: 63 MMHG

## 2019-09-09 VITALS — DIASTOLIC BLOOD PRESSURE: 81 MMHG | SYSTOLIC BLOOD PRESSURE: 178 MMHG

## 2019-09-09 VITALS — DIASTOLIC BLOOD PRESSURE: 75 MMHG | SYSTOLIC BLOOD PRESSURE: 136 MMHG

## 2019-09-09 VITALS — DIASTOLIC BLOOD PRESSURE: 74 MMHG | SYSTOLIC BLOOD PRESSURE: 167 MMHG

## 2019-09-09 VITALS — SYSTOLIC BLOOD PRESSURE: 178 MMHG | DIASTOLIC BLOOD PRESSURE: 81 MMHG

## 2019-09-09 LAB
ANION GAP SERPL CALC-SCNC: 13.4 MMOL/L (ref 8–16)
BASOPHILS # BLD AUTO: 0.1 10*3/UL (ref 0–0.1)
BASOPHILS NFR BLD AUTO: 0.6 % (ref 0–1)
BUN SERPL-MCNC: 25 MG/DL (ref 7–26)
BUN/CREAT SERPL: 24 (ref 6–25)
CALCIUM SERPL-MCNC: 10.8 MG/DL (ref 8.4–10.2)
CHLORIDE SERPL-SCNC: 105 MMOL/L (ref 98–107)
CO2 SERPL-SCNC: 25 MMOL/L (ref 22–29)
DEPRECATED NEUTROPHILS # BLD AUTO: 4.6 10*3/UL (ref 2.1–6.9)
EGFRCR SERPLBLD CKD-EPI 2021: 50 ML/MIN (ref 60–?)
EOSINOPHIL # BLD AUTO: 0.2 10*3/UL (ref 0–0.4)
EOSINOPHIL NFR BLD AUTO: 2.8 % (ref 0–6)
ERYTHROCYTE [DISTWIDTH] IN CORD BLOOD: 13.3 % (ref 11.7–14.4)
GLUCOSE SERPLBLD-MCNC: 93 MG/DL (ref 74–118)
HCT VFR BLD AUTO: 38.1 % (ref 34.2–44.1)
HGB BLD-MCNC: 12.2 G/DL (ref 12–16)
LYMPHOCYTES # BLD: 2.1 10*3/UL (ref 1–3.2)
LYMPHOCYTES NFR BLD AUTO: 26.9 % (ref 18–39.1)
MCH RBC QN AUTO: 32 PG (ref 28–32)
MCHC RBC AUTO-ENTMCNC: 32 G/DL (ref 31–35)
MCV RBC AUTO: 100 FL (ref 81–99)
MONOCYTES # BLD AUTO: 0.8 10*3/UL (ref 0.2–0.8)
MONOCYTES NFR BLD AUTO: 10.4 % (ref 4.4–11.3)
NEUTS SEG NFR BLD AUTO: 59 % (ref 38.7–80)
PLATELET # BLD AUTO: 276 X10E3/UL (ref 140–360)
POTASSIUM SERPL-SCNC: 4.4 MMOL/L (ref 3.5–5.1)
RBC # BLD AUTO: 3.81 X10E6/UL (ref 3.6–5.1)
SODIUM SERPL-SCNC: 139 MMOL/L (ref 136–145)

## 2019-09-09 RX ADMIN — APIXABAN SCH MG: 2.5 TABLET, FILM COATED ORAL at 08:40

## 2019-09-09 RX ADMIN — METOPROLOL SUCCINATE SCH MG: 25 TABLET, EXTENDED RELEASE ORAL at 08:40

## 2019-09-09 RX ADMIN — APIXABAN SCH MG: 2.5 TABLET, FILM COATED ORAL at 16:38

## 2019-09-09 NOTE — DIAGNOSTIC IMAGING REPORT
EXAM: Renal Ultrasound

INDICATION:        

^terrence

^[id number]

^1338  

COMPARISON: None 

TECHNIQUE: Transverse and longitudinal images of the kidneys and bladder were

obtained.  



FINDINGS:     



Right Kidney: 

Length: 8.9 cm

Appearance: Normal echogenicity.  

Collecting system: No hydronephrosis

Stones: None

Cyst/Mass: None



Left Kidney: 

Length: 9.9 cm

Appearance: Normal echogenicity.  

Collecting system: No hydronephrosis

Stones: None

Cyst/Mass: None



Bladder: 

No mass or calculi. Bilateral ureteral jets seen. Prevoid volume estimate of

14.9 cc.



IMPRESSION:

No hydronephrosis or renal calculi.



Signed by: Edda Hicks MD on 9/9/2019 2:43 PM

## 2019-09-09 NOTE — DISCHARGE SUMMARY
ADMIT DIAGNOSES:  

1. Chest pain.

2. Acute renal failure.

3. Anemia.

4. Hypercalcemia.

 

DISCHARGE DIAGNOSES:  

1. Atypical chest pain, resolved.

2. Paroxysmal atrial fibrillation.

3. Acute renal insufficiency, resolved.

 

HOSPITAL COURSE:  This is an 86-year-old white woman, who was initially admitted
to [facility] with diagnosis of atypical chest pain 
and acute

renal insufficiency.  She also has history of paroxysmal atrial fibrillation and
is on

Eliquis.  Moreover, she has a remote history of lower extremity deep venous 
thrombosis

and pulmonary embolism.  Also, she has history of moderate aortic stenosis.  
During this

hospitalization, patient's acute renal insufficiency resolved with intravenous 
fluids.

The patient's meloxicam and losartan were stopped because of the acute renal

insufficiency.  On admission, the patient's BUN and creatinine was 24, 1.47

respectively.  On day of discharge, the patient's BUN and creatinine was 25 and 
1.04

respectively.  During this hospitalization, the patient was seen by a 
nephrologist,

namely Dr. Christopher Cheema.  The patient was also seen by a cardiologist namely 
Dr. Bryce Garcia.  The patient was continued on Eliquis 2.5 mg twice a day for 
atrial fibrillation.

During this hospitalization, the patient did receive physical therapy.  Her 
brief

hospitalization was unremarkable.  Her condition on discharge was stable.  The 
patient

did undergo serial cardiac enzymes as well as electrocardiograms, which did not 
reveal

any evidence of acute myocardial ischemia or infarction. 

 

CONDITION ON DISCHARGE:  The patient's condition on discharge is stable.

 

DISCHARGE MEDICATIONS:  

1. Eliquis 2.5 mg twice a day.

2. Acetaminophen 600 mg every 6 hours p.r.n. pain.

3. Nephro-Elizabeth once daily.

4. Vitamin D3 2000 units daily.

5. Vitamin C 500 mg daily.

6. Amiodarone 200 mg daily.

7. Metoprolol succinate 25 mg daily.

8. Amlodipine 10 mg daily.

9. Biotin 1000 mcg daily. 

 

The patient was instructed to stop the following medications vitamin E, 
losartan, and

meloxicam.  The patient was also instructed to avoid all NSAIDs. 

 

FOLLOWUP INSTRUCTIONS:  The patient was instructed to follow up with [place] Marie his

primary care physician within next 7 to 10 days. 

 

 

 

 

______________________________

Juan M E Orahood, MD

 

MEO/MODL

D:  09/09/2019 17:07:00

T:  09/09/2019 17:41:51

Job #:  [id number]

 

cc:            MD Bryce Cortez MD

 

MTDD

## 2019-09-09 NOTE — PROGRESS NOTE
DATE:  09/08/2019

 

Cardiology Progress Note 

 

SUBJECTIVE:  No major events.

 

OBJECTIVE:  VITAL SIGNS:  Temperature afebrile, pulse 65, respiratory rate 18, blood

pressure 149/73, saturating 98% on room air. 

GENERAL:  Elderly female, in no acute distress. 

CARDIOVASCULAR:  Regular rate and rhythm.  No murmurs, rubs, or gallops. 

LUNGS:  Clear to auscultation bilaterally. 

ABDOMEN:  Soft, nontender, nondistended. 

NEURO AND PSYCH:  Alert and oriented to person, place, and time.  Normal affect.

 

INPATIENT MEDICATIONS:  Reviewed.

 

LABORATORY DATA:  Reviewed.

 

TELEMETRY DATA:  Reviewed, shows normal sinus rhythm.

 

ASSESSMENT:  

1. Moderate aortic stenosis.

2. History of aortic valve endocarditis.

3. Hypertension.

4. Hyperlipidemia.

5. Atrial fibrillation on Eliquis.

6. History of right lower extremity deep venous thrombosis and pulmonary embolism.

 

PLAN:  Renal function is improving.  The patient is feeling better with some gentle

hydration.  Remains euvolemic by exam.  Rule out for acute MI with serial troponin.

Doing well from a cardiovascular standpoint.  Continue current regimen. 

 

Thank you for this consult.  We will continue to follow.

 

 

 

 

______________________________

MD DON Lopez/LEONCIO

D:  09/09/2019 01:05:00

T:  09/09/2019 04:41:45

Job #:  [id number]

## 2019-09-09 NOTE — NUR
Patient discharged off the unit at this time via wheelchair. Patient education completed 
along with medication changes to follow at home. All questions answered accordingly. Patient 
in no distress. Pain is 3/10 to back.

## 2019-09-12 LAB — ALPHA2 GLOB 24H UR ELPH-MCNC: 15.8 %

## 2020-10-23 ENCOUNTER — HOSPITAL ENCOUNTER (OUTPATIENT)
Dept: HOSPITAL 88 - DX | Age: 85
End: 2020-10-23
Attending: INTERNAL MEDICINE
Payer: MEDICARE

## 2020-10-23 DIAGNOSIS — M81.0: Primary | ICD-10-CM

## 2020-10-23 PROCEDURE — 77080 DXA BONE DENSITY AXIAL: CPT

## 2020-10-27 ENCOUNTER — HOSPITAL ENCOUNTER (OUTPATIENT)
Dept: HOSPITAL 88 - CT | Age: 85
End: 2020-10-27
Attending: INTERNAL MEDICINE
Payer: MEDICARE

## 2020-10-27 DIAGNOSIS — R19.04: Primary | ICD-10-CM

## 2020-10-27 LAB
BUN SERPL-MCNC: 24 MG/DL (ref 7–26)
BUN/CREAT SERPL: 17 (ref 6–25)
EGFRCR SERPLBLD CKD-EPI 2021: 34 ML/MIN (ref 60–?)

## 2020-10-27 PROCEDURE — 74177 CT ABD & PELVIS W/CONTRAST: CPT

## 2020-10-27 PROCEDURE — 36415 COLL VENOUS BLD VENIPUNCTURE: CPT

## 2020-10-27 PROCEDURE — 96360 HYDRATION IV INFUSION INIT: CPT

## 2020-10-27 PROCEDURE — 84520 ASSAY OF UREA NITROGEN: CPT

## 2020-10-27 PROCEDURE — 82565 ASSAY OF CREATININE: CPT
